# Patient Record
Sex: MALE | Race: WHITE | NOT HISPANIC OR LATINO | Employment: OTHER | URBAN - METROPOLITAN AREA
[De-identification: names, ages, dates, MRNs, and addresses within clinical notes are randomized per-mention and may not be internally consistent; named-entity substitution may affect disease eponyms.]

---

## 2023-04-06 ENCOUNTER — OFFICE VISIT (OUTPATIENT)
Dept: PRIMARY CARE | Facility: CLINIC | Age: 73
End: 2023-04-06
Payer: MEDICARE

## 2023-04-06 VITALS
DIASTOLIC BLOOD PRESSURE: 78 MMHG | HEART RATE: 101 BPM | WEIGHT: 211 LBS | BODY MASS INDEX: 28.58 KG/M2 | HEIGHT: 72 IN | SYSTOLIC BLOOD PRESSURE: 149 MMHG | TEMPERATURE: 97 F

## 2023-04-06 DIAGNOSIS — E78.2 MIXED HYPERLIPIDEMIA: Primary | ICD-10-CM

## 2023-04-06 DIAGNOSIS — R74.8 ABNORMAL LIVER ENZYMES: ICD-10-CM

## 2023-04-06 DIAGNOSIS — G20.A1 PARKINSON'S DISEASE (MULTI): ICD-10-CM

## 2023-04-06 DIAGNOSIS — F10.20 ALCOHOL USE DISORDER, MODERATE, DEPENDENCE (MULTI): ICD-10-CM

## 2023-04-06 DIAGNOSIS — D64.9 CHRONIC ANEMIA: ICD-10-CM

## 2023-04-06 PROBLEM — K21.9 GERD (GASTROESOPHAGEAL REFLUX DISEASE): Status: ACTIVE | Noted: 2023-04-06

## 2023-04-06 PROBLEM — F33.9 MAJOR DEPRESSIVE DISORDER, RECURRENT, UNSPECIFIED (CMS-HCC): Status: ACTIVE | Noted: 2020-10-23

## 2023-04-06 PROBLEM — I10 HYPERTENSION: Status: ACTIVE | Noted: 2020-10-23

## 2023-04-06 PROBLEM — F33.9 MAJOR DEPRESSIVE DISORDER, RECURRENT, UNSPECIFIED (CMS-HCC): Status: RESOLVED | Noted: 2020-10-23 | Resolved: 2023-04-06

## 2023-04-06 PROCEDURE — 1036F TOBACCO NON-USER: CPT | Performed by: INTERNAL MEDICINE

## 2023-04-06 PROCEDURE — 3078F DIAST BP <80 MM HG: CPT | Performed by: INTERNAL MEDICINE

## 2023-04-06 PROCEDURE — 3077F SYST BP >= 140 MM HG: CPT | Performed by: INTERNAL MEDICINE

## 2023-04-06 PROCEDURE — 1160F RVW MEDS BY RX/DR IN RCRD: CPT | Performed by: INTERNAL MEDICINE

## 2023-04-06 PROCEDURE — 99214 OFFICE O/P EST MOD 30 MIN: CPT | Performed by: INTERNAL MEDICINE

## 2023-04-06 PROCEDURE — 1159F MED LIST DOCD IN RCRD: CPT | Performed by: INTERNAL MEDICINE

## 2023-04-06 RX ORDER — LANOLIN ALCOHOL/MO/W.PET/CERES
1 CREAM (GRAM) TOPICAL DAILY
COMMUNITY

## 2023-04-06 RX ORDER — OXYBUTYNIN CHLORIDE 5 MG/1
TABLET ORAL
COMMUNITY
Start: 2017-12-20

## 2023-04-06 RX ORDER — SENNOSIDES 8.6 MG/1
1 TABLET ORAL DAILY
COMMUNITY
Start: 2021-04-01 | End: 2024-05-01 | Stop reason: WASHOUT

## 2023-04-06 RX ORDER — ACETAMINOPHEN 325 MG/1
TABLET ORAL
COMMUNITY
Start: 2022-07-11

## 2023-04-06 RX ORDER — POLYETHYLENE GLYCOL 3350 17 G/17G
17 POWDER, FOR SOLUTION ORAL
COMMUNITY
Start: 2022-07-11

## 2023-04-06 RX ORDER — ATORVASTATIN CALCIUM 20 MG/1
20 TABLET, FILM COATED ORAL DAILY
Qty: 90 TABLET | Refills: 3 | Status: SHIPPED | OUTPATIENT
Start: 2023-04-06 | End: 2023-11-08 | Stop reason: SDUPTHER

## 2023-04-06 RX ORDER — FLUDROCORTISONE ACETATE 0.1 MG/1
0.1 TABLET ORAL
COMMUNITY

## 2023-04-06 RX ORDER — CARBIDOPA, LEVODOPA AND ENTACAPONE 37.5; 200; 15 MG/1; MG/1; MG/1
TABLET, FILM COATED ORAL
COMMUNITY
Start: 2021-04-01 | End: 2023-12-05 | Stop reason: SDUPTHER

## 2023-04-06 RX ORDER — MIRABEGRON 25 MG/1
1 TABLET, FILM COATED, EXTENDED RELEASE ORAL DAILY
COMMUNITY
Start: 2023-03-15

## 2023-04-06 RX ORDER — OXCARBAZEPINE 300 MG/1
2 TABLET, FILM COATED ORAL 2 TIMES DAILY
COMMUNITY
Start: 2021-04-01

## 2023-04-06 RX ORDER — DONEPEZIL HYDROCHLORIDE 10 MG/1
TABLET, FILM COATED ORAL
COMMUNITY

## 2023-04-06 RX ORDER — ROSUVASTATIN CALCIUM 10 MG/1
TABLET, COATED ORAL
COMMUNITY
End: 2023-04-06 | Stop reason: ALTCHOICE

## 2023-04-06 RX ORDER — ATORVASTATIN CALCIUM 20 MG/1
20 TABLET, FILM COATED ORAL DAILY
COMMUNITY
End: 2023-04-06 | Stop reason: SDUPTHER

## 2023-04-06 RX ORDER — MULTIVITAMIN
TABLET ORAL 2 TIMES DAILY
COMMUNITY
Start: 2021-03-13

## 2023-04-06 RX ORDER — CHOLECALCIFEROL (VITAMIN D3) 50 MCG
1 TABLET ORAL DAILY
COMMUNITY

## 2023-04-06 RX ORDER — DULOXETIN HYDROCHLORIDE 60 MG/1
CAPSULE, DELAYED RELEASE ORAL
COMMUNITY
Start: 2021-04-01 | End: 2023-04-06 | Stop reason: ALTCHOICE

## 2023-04-06 ASSESSMENT — ENCOUNTER SYMPTOMS
COUGH: 0
DEPRESSION: 0
LOSS OF SENSATION IN FEET: 0
POLYDIPSIA: 0
SHORTNESS OF BREATH: 0
FEVER: 0
OCCASIONAL FEELINGS OF UNSTEADINESS: 1
PALPITATIONS: 0
CHILLS: 0

## 2023-04-06 NOTE — PROGRESS NOTES
Subjective   Patient ID: Thomas Frankel is a 72 y.o. male who presents for No chief complaint on file..    72-year-old male presents today to establish care previous PCP retired at the end of 2022 but he has not seen him since 2021.  He has a history of advanced Parkinson's disease with a deep brain stimulator managed by a neurologist at the [facility].  He also has a history of abnormal liver enzymes, chronic alcohol abuse, chronic anemia, and a prolonged hospitalization at the end of 2022.  He is presenting today to establish care and I performed a detailed assessment review of the patient's medical chart records medications available today.  At this time he has no specific concerns or complaints that he needs to have addressed today.  They are planning for a battery replacement of his deep brain stimulator approximately 6 weeks from now with his neurologist.  There have been no recent medication adjustments made but future medication adjustments are possible post this per the reports of his neurologist.  There are no available assessments of the patient's overall liver health that I can find in the records despite his history of alcohol use and previous abnormal blood testing regarding liver enzymes.  I advised them to allow me to update some imaging and see if there is any signs of fatty liver disease or cirrhosis on ultrasound.  In addition I counseled the patient in detail regarding his current alcohol use estimated to be about 8 drinks per week and his long history of alcohol use.  He has periods of prolonged abstinence mixed in with his heavy alcohol use but is currently using at this time.  I provided him counseling regarding alcohol use behaviors advised him about different programs and options to get away from alcohol if he felt it was necessary but ultimately advised him about the negative effects of alcohol, how is directly impacting his health already and how it may impact his health in the  future if he does not adhere to a plan of total cessation.  At the conclusion of our discussion the patient and his wife expressed a plan of working on cessation together moving forward.         Review of Systems   Constitutional:  Negative for chills and fever.   Respiratory:  Negative for cough and shortness of breath.    Cardiovascular:  Negative for chest pain and palpitations.   Endocrine: Negative for polydipsia and polyuria.       Objective   /78   Pulse 101   Temp 36.1 °C (97 °F) (Oral)   Ht 1.829 m (6')   Wt 95.7 kg (211 lb)   BMI 28.62 kg/m²     Physical Exam  Constitutional:       Appearance: Normal appearance.   HENT:      Head: Normocephalic and atraumatic.   Eyes:      Extraocular Movements: Extraocular movements intact.      Pupils: Pupils are equal, round, and reactive to light.   Neck:      Thyroid: No thyroid mass or thyromegaly.      Vascular: No carotid bruit.   Cardiovascular:      Rate and Rhythm: Normal rate and regular rhythm.      Heart sounds: No murmur heard.     No friction rub. No gallop.   Pulmonary:      Effort: No respiratory distress.      Breath sounds: No wheezing, rhonchi or rales.   Musculoskeletal:      Cervical back: Neck supple.      Right lower leg: No edema.      Left lower leg: No edema.   Lymphadenopathy:      Cervical: No cervical adenopathy.   Neurological:      Mental Status: He is alert.         Assessment/Plan   Problem List Items Addressed This Visit          Nervous    Parkinson's disease (CMS/HCC)       Other    Alcohol use disorder, moderate, dependence (CMS/HCC)    Relevant Medications    atorvastatin (Lipitor) 20 mg tablet    Other Relevant Orders    US abdomen limited liver    CBC    Comprehensive Metabolic Panel    Lipid Panel    Mixed hyperlipidemia - Primary    Relevant Medications    atorvastatin (Lipitor) 20 mg tablet    Other Relevant Orders    US abdomen limited liver    CBC    Comprehensive Metabolic Panel    Lipid Panel     Other Visit  Diagnoses       Abnormal liver enzymes        Relevant Medications    atorvastatin (Lipitor) 20 mg tablet    Other Relevant Orders    US abdomen limited liver    CBC    Comprehensive Metabolic Panel    Lipid Panel    Chronic anemia        Relevant Medications    atorvastatin (Lipitor) 20 mg tablet    Other Relevant Orders    US abdomen limited liver    CBC    Comprehensive Metabolic Panel    Lipid Panel             Patient was identified as a fall risk. Risk prevention instructions provided.

## 2023-04-06 NOTE — PATIENT INSTRUCTIONS
Please follow-up in 3 months        Ways to Help Prevent Falls at Home    Quick Tips   ? Ask for help if you need it. Most people want to help!   ? Get up slowly after sitting or laying down   ? Wear a medical alert device or keep cell phone in your pocket   ? Use night lights, especially areas near a bathroom   ? Keep the items you use often within reach on a small stool or end table   ? Use an assistive device such as walker or cane, as directed by provider/physical therapy   ? Use a non-slip mat and grab bars in your bathroom. Look for home health sections for best options     Other Areas to Focus On   ? Exercise and nutrition: Regular exercise or taking a falls prevention class are great ways improve strength and balance. Don’t forget to stay hydrated and bring a snack!   ? Medicine side effects: Some medicines can make you sleepy or dizzy, which could cause a fall. Ask your healthcare provider about the side effects your medicines could cause. Be sure to let them know if you take any vitamins or supplements as well.   ? Tripping hazards: Remove items you could trip on, such as loose mats, rugs, cords, and clutter. Wear closed toe shoes with rubber soles.   ? Health and wellness: Get regular checkups with your healthcare provider, plus routine vision and hearing screenings. Talk with your healthcare provider about:   o Your medicines and the possible side effects - bring them in a bag if that is easier!   o Problems with balance or feeling dizzy   o Ways to promote bone health, such as Vitamin D and calcium supplements   o Questions or concerns about falling     *Ask your healthcare team if you have questions     [facility], 2022

## 2023-05-09 ENCOUNTER — HOSPITAL ENCOUNTER (OUTPATIENT)
Dept: DATA CONVERSION | Facility: HOSPITAL | Age: 73
End: 2023-05-09
Attending: NEUROLOGICAL SURGERY | Admitting: NEUROLOGICAL SURGERY
Payer: MEDICARE

## 2023-05-09 DIAGNOSIS — G20.A1 PARKINSON'S DISEASE (MULTI): ICD-10-CM

## 2023-05-09 DIAGNOSIS — F02.80 DEMENTIA IN OTHER DISEASES CLASSIFIED ELSEWHERE, UNSPECIFIED SEVERITY, WITHOUT BEHAVIORAL DISTURBANCE, PSYCHOTIC DISTURBANCE, MOOD DISTURBANCE, AND ANXIETY (MULTI): ICD-10-CM

## 2023-05-09 DIAGNOSIS — F10.21 ALCOHOL DEPENDENCE, IN REMISSION (MULTI): ICD-10-CM

## 2023-05-09 DIAGNOSIS — Z45.42 ENCOUNTER FOR ADJUSTMENT AND MANAGEMENT OF NEUROSTIMULATOR: ICD-10-CM

## 2023-05-09 DIAGNOSIS — K21.9 GASTRO-ESOPHAGEAL REFLUX DISEASE WITHOUT ESOPHAGITIS: ICD-10-CM

## 2023-05-09 DIAGNOSIS — E78.5 HYPERLIPIDEMIA, UNSPECIFIED: ICD-10-CM

## 2023-05-09 DIAGNOSIS — I10 ESSENTIAL (PRIMARY) HYPERTENSION: ICD-10-CM

## 2023-05-09 DIAGNOSIS — Z98.1 ARTHRODESIS STATUS: ICD-10-CM

## 2023-06-05 ENCOUNTER — TELEPHONE (OUTPATIENT)
Dept: PRIMARY CARE | Facility: CLINIC | Age: 73
End: 2023-06-05
Payer: MEDICARE

## 2023-06-05 NOTE — TELEPHONE ENCOUNTER
----- Message from Sumit Toscano MD sent at 5/31/2023  3:56 PM EDT -----  There is evidence for chronic long-term inflammation and fatty lesion in the liver related to the patient's history of alcohol abuse but no other findings.  There is no evidence for cirrhosis on this testing but the patient needs to continue away fro  m alcohol or other liver toxic chemicals.

## 2023-06-06 ENCOUNTER — TELEPHONE (OUTPATIENT)
Dept: PRIMARY CARE | Facility: CLINIC | Age: 73
End: 2023-06-06
Payer: MEDICARE

## 2023-06-06 NOTE — TELEPHONE ENCOUNTER
Patient wants her  wants to test her  for D3 and he was never tested to Cholesterol with previous labs before his surgery. Can we use the labs referral you did in April and at D3?

## 2023-06-07 DIAGNOSIS — E55.9 VITAMIN D DEFICIENCY: Primary | ICD-10-CM

## 2023-06-07 NOTE — TELEPHONE ENCOUNTER
Since my testing has not been done, I ordered it. As long as it is [facility] lab it can be seen and done at same time

## 2023-07-06 ENCOUNTER — LAB (OUTPATIENT)
Dept: LAB | Facility: LAB | Age: 73
End: 2023-07-06
Payer: MEDICARE

## 2023-07-06 DIAGNOSIS — F10.20 ALCOHOL USE DISORDER, MODERATE, DEPENDENCE (MULTI): ICD-10-CM

## 2023-07-06 DIAGNOSIS — D64.9 CHRONIC ANEMIA: ICD-10-CM

## 2023-07-06 DIAGNOSIS — R74.8 ABNORMAL LIVER ENZYMES: ICD-10-CM

## 2023-07-06 DIAGNOSIS — E55.9 VITAMIN D DEFICIENCY: ICD-10-CM

## 2023-07-06 DIAGNOSIS — E78.2 MIXED HYPERLIPIDEMIA: ICD-10-CM

## 2023-07-06 LAB
ALANINE AMINOTRANSFERASE (SGPT) (U/L) IN SER/PLAS: 7 U/L (ref 10–52)
ALBUMIN (G/DL) IN SER/PLAS: 4.3 G/DL (ref 3.4–5)
ALKALINE PHOSPHATASE (U/L) IN SER/PLAS: 98 U/L (ref 33–136)
ANION GAP IN SER/PLAS: 13 MMOL/L (ref 10–20)
ASPARTATE AMINOTRANSFERASE (SGOT) (U/L) IN SER/PLAS: 14 U/L (ref 9–39)
BILIRUBIN TOTAL (MG/DL) IN SER/PLAS: 0.5 MG/DL (ref 0–1.2)
CALCIDIOL (25 OH VITAMIN D3) (NG/ML) IN SER/PLAS: 26 NG/ML
CALCIUM (MG/DL) IN SER/PLAS: 9.8 MG/DL (ref 8.6–10.6)
CARBON DIOXIDE, TOTAL (MMOL/L) IN SER/PLAS: 27 MMOL/L (ref 21–32)
CHLORIDE (MMOL/L) IN SER/PLAS: 105 MMOL/L (ref 98–107)
CHOLESTEROL (MG/DL) IN SER/PLAS: 202 MG/DL (ref 0–199)
CHOLESTEROL IN HDL (MG/DL) IN SER/PLAS: 58.2 MG/DL
CHOLESTEROL/HDL RATIO: 3.5
CREATININE (MG/DL) IN SER/PLAS: 0.86 MG/DL (ref 0.5–1.3)
ERYTHROCYTE DISTRIBUTION WIDTH (RATIO) BY AUTOMATED COUNT: 12.1 % (ref 11.5–14.5)
ERYTHROCYTE MEAN CORPUSCULAR HEMOGLOBIN CONCENTRATION (G/DL) BY AUTOMATED: 33 G/DL (ref 32–36)
ERYTHROCYTE MEAN CORPUSCULAR VOLUME (FL) BY AUTOMATED COUNT: 96 FL (ref 80–100)
ERYTHROCYTES (10*6/UL) IN BLOOD BY AUTOMATED COUNT: 4.85 X10E12/L (ref 4.5–5.9)
GFR MALE: >90 ML/MIN/1.73M2
GLUCOSE (MG/DL) IN SER/PLAS: 96 MG/DL (ref 74–99)
HEMATOCRIT (%) IN BLOOD BY AUTOMATED COUNT: 46.7 % (ref 41–52)
HEMOGLOBIN (G/DL) IN BLOOD: 15.4 G/DL (ref 13.5–17.5)
LDL: 111 MG/DL (ref 0–99)
LEUKOCYTES (10*3/UL) IN BLOOD BY AUTOMATED COUNT: 5.6 X10E9/L (ref 4.4–11.3)
NRBC (PER 100 WBCS) BY AUTOMATED COUNT: 0 /100 WBC (ref 0–0)
PLATELETS (10*3/UL) IN BLOOD AUTOMATED COUNT: 268 X10E9/L (ref 150–450)
POTASSIUM (MMOL/L) IN SER/PLAS: 4.7 MMOL/L (ref 3.5–5.3)
PROTEIN TOTAL: 7 G/DL (ref 6.4–8.2)
SODIUM (MMOL/L) IN SER/PLAS: 140 MMOL/L (ref 136–145)
TRIGLYCERIDE (MG/DL) IN SER/PLAS: 166 MG/DL (ref 0–149)
UREA NITROGEN (MG/DL) IN SER/PLAS: 12 MG/DL (ref 6–23)
VLDL: 33 MG/DL (ref 0–40)

## 2023-07-06 PROCEDURE — 80053 COMPREHEN METABOLIC PANEL: CPT

## 2023-07-06 PROCEDURE — 80061 LIPID PANEL: CPT

## 2023-07-06 PROCEDURE — 85027 COMPLETE CBC AUTOMATED: CPT

## 2023-07-06 PROCEDURE — 82306 VITAMIN D 25 HYDROXY: CPT

## 2023-07-06 PROCEDURE — 36415 COLL VENOUS BLD VENIPUNCTURE: CPT

## 2023-07-07 NOTE — RESULT ENCOUNTER NOTE
Lab work overall looks very good the only noted abnormality was mild vitamin D deficiency.  If not already taking vitamin D would encourage the over-the-counter version 2000 units once a day as preferred option to maintain strong vitamin D levels as well as healthy bones and other benefits of normal vitamin D

## 2023-09-07 VITALS — HEIGHT: 72 IN | BODY MASS INDEX: 26.87 KG/M2 | WEIGHT: 198.41 LBS

## 2023-09-14 NOTE — H&P
History & Physical Reviewed:   I have reviewed the History and Physical dated:  09-May-2023   History and Physical reviewed and relevant findings noted. Patient examined to review pertinent physical  findings.: No significant changes   Home Medications Reviewed: no changes noted   Allergies Reviewed: no changes noted       ERAS (Enhanced Recovery After Surgery):  ·  ERAS Patient: no     Consent:   COVID-19 Consent:  ·  COVID-19 Risk Consent Surgeon has reviewed key risks related to the risk of pedro COVID-19 and if they contract COVID-19 what the risks are.     Attestation:   Note Completion:  I am a:  Resident/Fellow   Attending Attestation I saw and evaluated the patient.  I personally obtained the key and critical portions of the history and physical exam or was physically present for key and  critical portions performed by the resident/fellow. I reviewed the resident/fellow?s documentation and discussed the patient with the resident/fellow.  I agree with the resident/fellow?s medical decision making as documented in the note.     I personally evaluated the patient on 09-May-2023         Electronic Signatures:  Martin James (Resident))  (Signed 09-May-2023 05:45)   Authored: History & Physical Reviewed, ERAS, Consent,  Note Completion  Herve Pelayo)  (Signed 09-May-2023 15:00)   Authored: Note Completion   Co-Signer: History & Physical Reviewed, ERAS, Consent, Note Completion      Last Updated: 09-May-2023 15:00 by Herve Pelayo)

## 2023-10-02 NOTE — OP NOTE
PROCEDURE DETAILS    Preoperative Diagnosis:  Parkinson disease  Postoperative Diagnosis:  Parkinson disease  Surgeon: Dr. Herve Pelayo  Resident/Fellow/Other Assistant: Martin James    Procedure:  Bilateral deep brain stimulator replacement  Anesthesia: LMA  Estimated Blood Loss: 2 cc  Findings: Excellent impedances   Specimens(s) Collected: no,     Complications: none apparent  Drains and/or Catheters: none        Operative Report:   Patient is a 72 year old male with history of Parkinson's disease who has bilateral DBS and generators. He is coming in for bilateral generator replacement.  Risk and benefits of the procedure was explained to the patient and his wife. Informed consent was obtained.    Patient was identified in the preop and brought to the operating room. A safety huddle was performed, patient identifiers, medications and allergies reviewed. Patient transferred to the operating table. All pressure points padded. He underwent LMA anesthesia.  Prior incisions were marked, prepped and draped in sterile fashion. Old incisions opened. Batteries removed and leads connected to the new generators. Impedances were excellent. Batteries secured in the pocket with silk sutures and incisions closed in  multilayer fashion using Vicryl and Monocryl sutures. Skin closed with skin glue. All counts were correct.    Dr. Pelayo was present and scrubbed for all critical portions of the case.                        Attestation:   Note Completion:  Attending Attestation I was present for key portions of the procedure and the procedure lasted longer than 5 minutes.    I am a: Resident/Fellow         Electronic Signatures:  Martin James (Resident))  (Signed 09-May-2023 09:17)   Authored: Post-Operative Note, Chart Review, Note Completion  Herve Pelayo)  (Signed 09-May-2023 14:44)   Authored: Note Completion   Co-Signer: Post-Operative Note, Chart Review, Note Completion      Last Updated: 09-May-2023  14:44 by Herve Pelayo)

## 2023-10-03 ENCOUNTER — APPOINTMENT (OUTPATIENT)
Dept: PRIMARY CARE | Facility: CLINIC | Age: 73
End: 2023-10-03
Payer: MEDICARE

## 2023-10-11 ENCOUNTER — TELEPHONE (OUTPATIENT)
Dept: NEUROLOGY | Facility: CLINIC | Age: 73
End: 2023-10-11
Payer: MEDICARE

## 2023-10-11 DIAGNOSIS — G20.A1 PARKINSON'S DISEASE, UNSPECIFIED WHETHER DYSKINESIA PRESENT, UNSPECIFIED WHETHER MANIFESTATIONS FLUCTUATE (MULTI): Primary | ICD-10-CM

## 2023-10-11 NOTE — TELEPHONE ENCOUNTER
Wife called asking for new referral for PT for Parkinson's ... I printed out one put in in July By Dr. Brandon Shen whom is resident. Should his one still be good or would they need a new one

## 2023-11-07 ENCOUNTER — TELEPHONE (OUTPATIENT)
Dept: NEUROLOGY | Facility: CLINIC | Age: 73
End: 2023-11-07
Payer: MEDICARE

## 2023-11-07 ENCOUNTER — APPOINTMENT (OUTPATIENT)
Dept: PHYSICAL THERAPY | Facility: CLINIC | Age: 73
End: 2023-11-07
Payer: MEDICARE

## 2023-11-07 DIAGNOSIS — G20.A1 PARKINSON'S DISEASE, UNSPECIFIED WHETHER DYSKINESIA PRESENT, UNSPECIFIED WHETHER MANIFESTATIONS FLUCTUATE (MULTI): Primary | ICD-10-CM

## 2023-11-07 NOTE — TELEPHONE ENCOUNTER
----- Message from Oumou Kenny, OT sent at 11/7/2023 11:30 AM EST -----  Regarding: referral request    Randy Humphrey    Mr. Frankel is scheduled next week for a PT and OT evaluation, however only has a referral for PT. For insurance purposes, could you please place a referral in for Occupational Therapy?    Thank you,  Oumou Kenny OTR/L

## 2023-11-08 ENCOUNTER — OFFICE VISIT (OUTPATIENT)
Dept: PRIMARY CARE | Facility: CLINIC | Age: 73
End: 2023-11-08
Payer: MEDICARE

## 2023-11-08 ENCOUNTER — PHARMACY VISIT (OUTPATIENT)
Dept: PHARMACY | Facility: CLINIC | Age: 73
End: 2023-11-08
Payer: COMMERCIAL

## 2023-11-08 VITALS
WEIGHT: 203 LBS | BODY MASS INDEX: 27.5 KG/M2 | TEMPERATURE: 97.9 F | SYSTOLIC BLOOD PRESSURE: 105 MMHG | HEIGHT: 72 IN | DIASTOLIC BLOOD PRESSURE: 66 MMHG | HEART RATE: 80 BPM

## 2023-11-08 DIAGNOSIS — I10 PRIMARY HYPERTENSION: ICD-10-CM

## 2023-11-08 DIAGNOSIS — R74.8 ABNORMAL LIVER ENZYMES: ICD-10-CM

## 2023-11-08 DIAGNOSIS — L08.9 RECURRENT INFECTION OF SKIN: Primary | ICD-10-CM

## 2023-11-08 DIAGNOSIS — D64.9 CHRONIC ANEMIA: ICD-10-CM

## 2023-11-08 DIAGNOSIS — E78.2 MIXED HYPERLIPIDEMIA: ICD-10-CM

## 2023-11-08 DIAGNOSIS — Z29.11 NEED FOR RSV IMMUNIZATION: ICD-10-CM

## 2023-11-08 DIAGNOSIS — F10.20 ALCOHOL USE DISORDER, MODERATE, DEPENDENCE (MULTI): ICD-10-CM

## 2023-11-08 PROCEDURE — 1159F MED LIST DOCD IN RCRD: CPT | Performed by: INTERNAL MEDICINE

## 2023-11-08 PROCEDURE — 1036F TOBACCO NON-USER: CPT | Performed by: INTERNAL MEDICINE

## 2023-11-08 PROCEDURE — RXMED WILLOW AMBULATORY MEDICATION CHARGE

## 2023-11-08 PROCEDURE — 3078F DIAST BP <80 MM HG: CPT | Performed by: INTERNAL MEDICINE

## 2023-11-08 PROCEDURE — 99213 OFFICE O/P EST LOW 20 MIN: CPT | Performed by: INTERNAL MEDICINE

## 2023-11-08 PROCEDURE — 1125F AMNT PAIN NOTED PAIN PRSNT: CPT | Performed by: INTERNAL MEDICINE

## 2023-11-08 PROCEDURE — 1160F RVW MEDS BY RX/DR IN RCRD: CPT | Performed by: INTERNAL MEDICINE

## 2023-11-08 PROCEDURE — 3074F SYST BP LT 130 MM HG: CPT | Performed by: INTERNAL MEDICINE

## 2023-11-08 RX ORDER — RESPIRATORY SYNCYTIAL VISUS VACCINE RECOMBINANT, ADJUVANTED 120MCG/0.5
0.5 KIT INTRAMUSCULAR ONCE
Qty: 0.5 ML | Refills: 0 | Status: SHIPPED | OUTPATIENT
Start: 2023-11-08 | End: 2023-11-09

## 2023-11-08 RX ORDER — CHLORHEXIDINE GLUCONATE 40 MG/ML
SOLUTION TOPICAL DAILY PRN
Qty: 236 ML | Refills: 0 | Status: SHIPPED | OUTPATIENT
Start: 2023-11-08 | End: 2024-05-01 | Stop reason: ALTCHOICE

## 2023-11-08 RX ORDER — ATORVASTATIN CALCIUM 20 MG/1
20 TABLET, FILM COATED ORAL DAILY
Qty: 90 TABLET | Refills: 3 | Status: SHIPPED | OUTPATIENT
Start: 2023-11-08 | End: 2024-05-01 | Stop reason: SDUPTHER

## 2023-11-08 ASSESSMENT — ENCOUNTER SYMPTOMS
FEVER: 0
POLYDIPSIA: 0
PALPITATIONS: 0
SHORTNESS OF BREATH: 0
CHILLS: 0
COUGH: 0

## 2023-11-08 NOTE — PROGRESS NOTES
Subjective   Patient ID: Thomas Frankel is a 73 y.o. male who presents for Follow-up.    Multiple beneficial changes since her last encounter.  First and foremost the patient has completely abstained from drinking since her last encounter we performed a significant intervention and counseling discussion regarding the complications of alcohol with his current medications and medical diagnoses.  This is wonderful and I was very encouraging of him to continue down this path.    His Parkinson's is overall stable but there was some recent issues with recurring chest wall infection and stimulator pocket infections that have been treated and are gone at this time but there were 2 within a 2-month.  And I advised him about recurring infections although there is no positive culture to consider a protocol with Hibiclens to cleanse the skin of any abnormal bacteria at this time.  They were very encouraged by this and were agreeable to doing it a prescription was sent to the pharmacy for them to obtain the proper product.  Detailed instructions given to patient and family today.    In addition to the above, we discussed his recent blood work and the stability of them.  Encouraged good medication compliance and follow-through.  And discussed his recent 2 episodes of falls and increased weakness in the setting of Parkinson's.  His Parkinson's neurologist is already taken the steps of scheduling and getting the patient into physical therapy, he will be visiting aside specifically that specializes in Parkinson's patients as well.  No further intervention required at this time         Review of Systems   Constitutional:  Negative for chills and fever.   Respiratory:  Negative for cough and shortness of breath.    Cardiovascular:  Negative for chest pain and palpitations.   Endocrine: Negative for polydipsia and polyuria.       Objective   /66 (BP Location: Right arm, Patient Position: Sitting, BP Cuff Size: Large adult)    Pulse 80   Temp 36.6 °C (97.9 °F) (Temporal)   Ht 1.829 m (6')   Wt 92.1 kg (203 lb)   BMI 27.53 kg/m²     Physical Exam  Constitutional:       Appearance: Normal appearance.   HENT:      Head: Normocephalic and atraumatic.   Eyes:      Extraocular Movements: Extraocular movements intact.      Pupils: Pupils are equal, round, and reactive to light.   Neck:      Thyroid: No thyroid mass or thyromegaly.      Vascular: No carotid bruit.   Cardiovascular:      Rate and Rhythm: Normal rate and regular rhythm.      Heart sounds: No murmur heard.     No friction rub. No gallop.   Pulmonary:      Effort: No respiratory distress.      Breath sounds: No wheezing, rhonchi or rales.   Musculoskeletal:      Cervical back: Neck supple.      Right lower leg: No edema.      Left lower leg: No edema.   Lymphadenopathy:      Cervical: No cervical adenopathy.   Neurological:      Mental Status: He is alert.         Assessment/Plan   Problem List Items Addressed This Visit             ICD-10-CM    Mixed hyperlipidemia E78.2    Relevant Medications    atorvastatin (Lipitor) 20 mg tablet    Hypertension I10     Other Visit Diagnoses         Codes    Recurrent infection of skin    -  Primary L08.9    Relevant Medications    chlorhexidine (Hibiclens) 4 % external liquid    Abnormal liver enzymes     R74.8    Relevant Medications    atorvastatin (Lipitor) 20 mg tablet    Alcohol use disorder, moderate, dependence (CMS/HCC)     F10.20    Relevant Medications    atorvastatin (Lipitor) 20 mg tablet    Chronic anemia     D64.9    Relevant Medications    atorvastatin (Lipitor) 20 mg tablet    Need for RSV immunization     Z29.11    Relevant Medications    respiratory syncytial virus, rsv, with adjuvant suspension (Arexvy, PF,) 120 mcg/0.5 mL suspension for reconstitution

## 2023-11-14 ENCOUNTER — EVALUATION (OUTPATIENT)
Dept: OCCUPATIONAL THERAPY | Facility: CLINIC | Age: 73
End: 2023-11-14
Payer: MEDICARE

## 2023-11-14 ENCOUNTER — EVALUATION (OUTPATIENT)
Dept: PHYSICAL THERAPY | Facility: CLINIC | Age: 73
End: 2023-11-14
Payer: MEDICARE

## 2023-11-14 DIAGNOSIS — G20.A1 PARKINSON'S DISEASE (MULTI): ICD-10-CM

## 2023-11-14 DIAGNOSIS — R25.1 TREMOR: Primary | ICD-10-CM

## 2023-11-14 DIAGNOSIS — R26.9 PROGRESSIVE GAIT DISORDER: ICD-10-CM

## 2023-11-14 DIAGNOSIS — R29.6 RECURRENT FALLS: Primary | ICD-10-CM

## 2023-11-14 DIAGNOSIS — G20.A1 PARKINSON'S DISEASE, UNSPECIFIED WHETHER DYSKINESIA PRESENT, UNSPECIFIED WHETHER MANIFESTATIONS FLUCTUATE (MULTI): ICD-10-CM

## 2023-11-14 PROCEDURE — 97162 PT EVAL MOD COMPLEX 30 MIN: CPT | Mod: GP | Performed by: PHYSICAL THERAPIST

## 2023-11-14 PROCEDURE — 97112 NEUROMUSCULAR REEDUCATION: CPT | Mod: GP | Performed by: PHYSICAL THERAPIST

## 2023-11-14 PROCEDURE — 97530 THERAPEUTIC ACTIVITIES: CPT | Mod: GO

## 2023-11-14 PROCEDURE — 97165 OT EVAL LOW COMPLEX 30 MIN: CPT | Mod: GO

## 2023-11-14 ASSESSMENT — ENCOUNTER SYMPTOMS
OCCASIONAL FEELINGS OF UNSTEADINESS: 1
DEPRESSION: 1
LOSS OF SENSATION IN FEET: 0

## 2023-11-14 ASSESSMENT — PAIN SCALES - GENERAL: PAINLEVEL_OUTOF10: 0 - NO PAIN

## 2023-11-14 ASSESSMENT — PAIN - FUNCTIONAL ASSESSMENT: PAIN_FUNCTIONAL_ASSESSMENT: 0-10

## 2023-11-14 NOTE — PROGRESS NOTES
"Occupational Therapy    Occupational Therapy Evaluation    Name: Thomas Frankel  MRN: [id number]  : 1950  Date: 23  Time Calculation  Start Time: 1015  Stop Time: 1100  Time Calculation (min): 45 min  Visit: 1  Medicare  Assessment:   Patient is a 73 year old male with dx. Of PD. Patient presents WFL of UE strength, ROM and coordination at this time. Patient and spouse report patient does not have difficulties with ADLs. I did still educate patient and spouse on adaptive equipment/modifications as needed when patient's L hand tremors got worse. Patient and spouse were in agreement no further skilled OT was needed at this time. Patient will continue with PT for balance and mobility.  Plan:   No further visits indicated at this time.    Subjective   Patient agreeable to OT evaluation. Accompanied by spouse. Completes ADLs at mod I level. Has grab bars in showers.   Current Problem:  1. Tremor        2. Parkinson's disease          General:   Mr. Frankel is a 71 YO man with PD(dx , s/p bl STN [employer] DBS in ), cognitive decline.Per wife, patient was diagnosed with PD at age 41. Dr. Lezama was his neurologist at the time. He had DBS done in  by Dr. Ferreira who was at [facility] back then. He has been on Stavelo for many years with good control of tremors.  General  Reason for Referral: OT eval and treat  Referred By: Brandon Shen MD  Precautions:  Precautions  STEADI Fall Risk Score (The score of 4 or more indicates an increased risk of falling): 10  Precautions Comment: high fall risk  Pain Assessment:  Pain Assessment  Pain Assessment: 0-10  Pain Score: 0 - No pain  Work History:  Retired  Roles/Routines:  Does chores, watches sports   Patient Goal:  \"To get stronger\"  Personal Factors that my impact Care:   Cognition  Objective   Observation:  R handed  Uses straight cane   Glasses for reading  ROM:  BUE ROM WFL  Strength:  BUE WFL  R : 80#  L : 75#  Coordination:  Slight tremor in B " hands with digit opposition. Able to complete finger to nose with eyes open and closed   Sensation:  Denies parathesias   Tremor   Reports when medication wears off, the L hand tremor returns in the evenings occasionally  Outcome Measures:  OT Adult Other Outcome Measures  9 Hole Peg Test:  (R: 30 seconds   L: 31 seconds)    OP EDUCATION/TREATMENT:   Patient and spouse were educated on and were able to try weighted items I.e weighted eating utensils, weighted pen anne, weighted hand glove. Also educated on keeping proximal joints on table top when eating to assist with decreasing distal tremor.

## 2023-11-14 NOTE — LETTER
November 14, 2023     Patient: Thomas Frankel   YOB: 1950   Date of Visit: 11/14/2023       To Whom it May Concern:    Thomas Frankel was seen in my clinic on 11/14/2023. He {Return to school/sport:10193}.    If you have any questions or concerns, please don't hesitate to call.         Sincerely,          Sandeep Sanders, PT        CC: No Recipients

## 2023-11-14 NOTE — Clinical Note
November 14, 2023     Patient: Thomas Frankel   YOB: 1950   Date of Visit: 11/14/2023       To Whom it May Concern:    Thomas Frankel was seen in my clinic on 11/14/2023. He {Return to school/sport:55752}.    If you have any questions or concerns, please don't hesitate to call.         Sincerely,          Oumou Kenny, OT        CC: No Recipients

## 2023-11-14 NOTE — PROGRESS NOTES
Patient is accompanied by his spouse who answers most questions. 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 He will benefit from skilled PT to reduce fall risk and maximize his functional mobility.     Plan:  Treatment/Interventions: Education/ Instruction, Gait training, Manual therapy, Neuromuscular re-education, Therapeutic exercises, Therapeutic activities  PT Plan: Skilled PT  PT Frequency: 1 time per week (1-2 x per week)  Duration: 10 visits  Onset Date: 10/11/23  Certification Period Start Date: 11/14/23  Certification Period End Date: 02/12/24  Rehab Potential: Good  Plan of Care Agreement: Patient, Other (comment) (spouse)    Current Problem:   1. Recurrent falls        2. Parkinson's disease, unspecified whether dyskinesia present, unspecified whether manifestations fluctuate  Referral to Physical Therapy    Follow Up In Physical Therapy      3. Progressive gait disorder  Follow Up In Physical Therapy          Subjective    General:  General  Reason for Referral: PT for PD, gait disorder, falls  Referred By: Kam Chamorro CNP  Past Medical History Relevant to Rehab: Lumbar surgeries, left ankle Fx and ORIF, PD with DBS,  Preferred Learning Style: visual, verbal    Diagnosed with PD 30+ years ago in his 40s. Had DBS placed 15 years ago. Chief PD complaint of are incontinence, gait instability, left hand tremor. Has fallen at least 5 times in the last year. He can get himself back up.   Takes carbidopa/levadopa 5x a day, including once in the middle of the night. Does have  off periods before next dose.   Lives with wife in 2 story home. Two railings on the stairs. Bed and bath upstairs. Fell a few times in the last month. Broke 8 ribs last year from a fall and was hospitalized with some complications for 5 months. Had delirium and punctured lung. Received 2 acute rehab stays, one at [facility] and one at . After acute rehab had a SNF stay for 2-3 months. Made excellent functional improvements with all the therapy. He is roughly at his preinjury baseline now.   PMH: 2 lumbar surgeries- fusion, chronic low back pain, left ankle fractured,   Has had multiple episodes of outpatient rehab in the past for PD including PT, OT, SLP.      Precautions:  Precautions  STEADI Fall Risk Score (The score of 4 or more indicates an increased risk of falling): 10  Precautions Comment: high fall risk  Vital Signs:  Reports stable BP in the 120s. No reports of hypotension symptoms.   Pain:  Chronic low back pain  Prior Level of Function:  Modified independent to independent with ADLs. Does not leave the house except for MD appointments due to incontinence issues and mobility impairments    Objective   Cognition:  Appropriate during todays session. No significant cognitive deficits were observed without specific tests today.   Speech is hypophonic. Pt is less talkative- wife typically responds.   General Assessments:  Posture Comment: Postural deformity due to chronic lumbar surgeries and postural deviations, lateral scoliotic curbe    [facility] Comments: Limited left ankle inversion > eversion d/t remote Fx    Strength Comments: Bilateral LE strength is WNL except left ankle (left ankle DF 4/5, PF 4/5, inversion 3/5, eversion 4-/5)      Functional Assessments:  Gait Comment: Lack of left ankle push off, left foot whips into ER at late stance, some LE dystonia, gait instability, walks on heels, decreased arm swing  , Balance Comment: half tandem = 24 secs; half tandem with EC = 6 secs; EC on foam = 14  secs; SLS left = 2 secs, right = 5 secs; EC on incline = 5 secs    , Stairs Comment: Ambulates up/down reciprocally with railing  , Bed Mobility Comment: Independent  , Transfers Comment: Needs UE assist for standing transfers, some imbalance and retropulsion with transfers    Outcome Measures:  Timed Up and Go Test  How many seconds did it take to complete the 5 tasks?: 15.19 seconds  TUG Cognitive: 20.3  MiniBest score = 14/28 (fall risk)  Other Measures  10M Walk Test: 11.22 secs     OP EDUCATION:  Discussed Eval findings, treatment plan, impacts of PD versus other musculoskeletal conditions, goals, and expectations.   Educated on the importance of movement and exercise in PD, fall risk and safety, left ankle weakness specifically, and [facility].     Goals:  Active       PT Problem       Falls       Start:  11/14/23    Expected End:  01/28/24       Pt. Will report no falls in > 7 weeks. Pt will be able to restate fall precaution measures and will have implemented these measures appropriately to minimize fall risk.          Mini Best        Start:  11/14/23    Expected End:  01/28/24       Improve MiniBest score to > 18/28 to indicate improved multifactorial balance and reduced fall risk.          TUG        Start:  11/14/23    Expected End:  01/28/24       TUG to < 13 seconds, TUG cog/motor < 15.5 secs  Pt. Will demonstrate minimal negative impact on gait quality and safety during dual task mobility.         Balance       Start:  11/14/23    Expected End:  01/28/24       Half tandem w/ EC > 15 secs; eyes closed narrow base on foam > 15 secs; EC on incline > 15 secs; half tandem with head turns > 20 seconds         Home program       Start:  11/14/23    Expected End:  01/28/24       Pt. Will be independent and compliant with home program. He will begin exercise at [facility] prior to discharge from PT.

## 2023-11-14 NOTE — LETTER
November 14, 2023     Patient: Thomas Frankel   YOB: 1950   Date of Visit: 11/14/2023       To Whom It May Concern:    It is my medical opinion that Thomas Frankel {Work release (duty restriction):18086}.    If you have any questions or concerns, please don't hesitate to call.         Sincerely,        Sandeep Sanders, PT    CC: No Recipients

## 2023-11-14 NOTE — Clinical Note
November 14, 2023     Patient: Thomas Frankel   YOB: 1950   Date of Visit: 11/14/2023       To Whom It May Concern:    It is my medical opinion that Thomas Frankel {Work release (duty restriction):74733}.    If you have any questions or concerns, please don't hesitate to call.         Sincerely,        Oumou Kenny, OT    CC: No Recipients

## 2023-11-20 ENCOUNTER — TREATMENT (OUTPATIENT)
Dept: PHYSICAL THERAPY | Facility: CLINIC | Age: 73
End: 2023-11-20
Payer: MEDICARE

## 2023-11-20 DIAGNOSIS — R26.9 PROGRESSIVE GAIT DISORDER: ICD-10-CM

## 2023-11-20 DIAGNOSIS — R29.6 RECURRENT FALLS: Primary | ICD-10-CM

## 2023-11-20 DIAGNOSIS — G20.A1 PARKINSON'S DISEASE, UNSPECIFIED WHETHER DYSKINESIA PRESENT, UNSPECIFIED WHETHER MANIFESTATIONS FLUCTUATE (MULTI): ICD-10-CM

## 2023-11-20 PROCEDURE — 97112 NEUROMUSCULAR REEDUCATION: CPT | Mod: GP | Performed by: PHYSICAL THERAPIST

## 2023-11-20 PROCEDURE — 97110 THERAPEUTIC EXERCISES: CPT | Mod: GP | Performed by: PHYSICAL THERAPIST

## 2023-11-20 ASSESSMENT — PAIN SCALES - GENERAL: PAINLEVEL_OUTOF10: 0 - NO PAIN

## 2023-11-20 ASSESSMENT — PAIN - FUNCTIONAL ASSESSMENT: PAIN_FUNCTIONAL_ASSESSMENT: 0-10

## 2023-11-20 NOTE — PROGRESS NOTES
"Physical Therapy    Physical Therapy Treatment    Patient Name: Thomas Frankel  MRN: [id number]  Today's Date: 11/20/2023  Time Calculation  Start Time: 1415  Stop Time: 1500  Time Calculation (min): 45 min    Visit #2  Assessment:  We focused today's session on training with new HEP. Provided ample HEP education to patient and spouse. Spouse was present during the entire session and included in all training. Issued HEP via demo, practice, and handouts. Marked left ankle weakness is noted during ankle strengthening exercises. Wife was really pleased at how well he stood up from a chair after training to bend at the waist and get \"nose over toes\".  With arms across his chest, he needed multiple attempts or assistance to rise from chair 75% of the time.     Plan:  Continue skilled PT per plan of care.     Current Problem  1. Parkinson's disease, unspecified whether dyskinesia present, unspecified whether manifestations fluctuate  Follow Up In Physical Therapy      2. Progressive gait disorder  Follow Up In Physical Therapy          General     General  Reason for Referral: PT for PD, gait disorder, falls  Referred By: Kam Chamorro  Past Medical History Relevant to Rehab: Lumbar surgeries  Preferred Learning Style: visual, verbal    Subjective    Precautions  Precautions  STEADI Fall Risk Score (The score of 4 or more indicates an increased risk of falling): 10  Precautions Comment: high fall risk  Vital Signs     Pain  No pain reported today    Objective     Treatments:  - standing heel raises with UE support for left ankle PF strength. 3 x 10  - standing postural training with back to wall. 1 minute holds x 3 sets. Cues for chin tuck, full knee extension and shoulders back  - alternating 6 inch step taps x 30 without UE support  - seated hamstring stretching. 30s x 3 ea  - seated left ankle inversion w/ TB. Red. 2 x 10  - seated left ankle eversion w/TB. Red 2 x 10  - supine elbow press postural stretching. 5s " holds x 10    Neuromuscular Re-Education:  - seated twist and reach PD exercise 5 s hold x 10 ea  - heel - toe rocking with back to wall. X 30. Without UE support  - sit to stands from arm chair. X 10 with UE assist. Progressed to 10 reps with arms across chest.   OP EDUCATION:  Provided ample HEP education to patient and spouse. Spouse was present during the entire session and included in all training. Issued HEP via demo, practice, and handouts.   Goals

## 2023-11-30 ENCOUNTER — TREATMENT (OUTPATIENT)
Dept: PHYSICAL THERAPY | Facility: CLINIC | Age: 73
End: 2023-11-30
Payer: MEDICARE

## 2023-11-30 DIAGNOSIS — R26.9 PROGRESSIVE GAIT DISORDER: Primary | ICD-10-CM

## 2023-11-30 DIAGNOSIS — R29.6 RECURRENT FALLS: ICD-10-CM

## 2023-11-30 DIAGNOSIS — G20.A1 PARKINSON'S DISEASE, UNSPECIFIED WHETHER DYSKINESIA PRESENT, UNSPECIFIED WHETHER MANIFESTATIONS FLUCTUATE (MULTI): ICD-10-CM

## 2023-11-30 PROCEDURE — 97112 NEUROMUSCULAR REEDUCATION: CPT | Mod: GP | Performed by: PHYSICAL THERAPIST

## 2023-11-30 PROCEDURE — 97110 THERAPEUTIC EXERCISES: CPT | Mod: GP | Performed by: PHYSICAL THERAPIST

## 2023-11-30 NOTE — PROGRESS NOTES
Physical Therapy    Physical Therapy Treatment    Patient Name: Thomas Frankel  MRN: [id number]  Today's Date: 11/30/2023  Time Calculation  Start Time: 1015  Stop Time: 1100  Time Calculation (min): 45 min    Visit #3  Assessment:  Limited left ankle ROM causes gait deviation in late left stance to toe off. There is diminished anterior tibial translation and lack of push off. He tends to walk on his heels primarily which reduces his stability. Notable instability during standing activities on foam, so light UE support was needed. Hip dissociation is improving during sit <> stand which gets more weight anterior and helps him control sit<> stand better.     Plan:  Continue skilled PT per plan of care.     Current Problem  1. Progressive gait disorder  Follow Up In Physical Therapy      2. Parkinson's disease, unspecified whether dyskinesia present, unspecified whether manifestations fluctuate  Follow Up In Physical Therapy      3. Recurrent falls            General  Subjective    Pt. Reports that his HEP is difficult. When he elaborates it is apparent that his HEP provides the appropriate amount of challenge but it is not too difficult.   Precautions  High fall risk    Pain  Some lower back pain. He believes that he pulled a muscle in his back. His movement today was not hindered by this.     Objective     Treatments:  Therapeutic Exercise:  - supine elbow press postural stretching. 5s holds x 10  - bridging with Legs on t-ball. 5s x 15  - seated hamstring stretching. 30s x 3 ea    Not Performed today:  - seated left ankle inversion w/ TB. Red. 2 x 10  - seated left ankle eversion w/TB. Red 2 x 10      Neuromuscular Re-Education:  - 8 inch step taps on foam. 2 x 15 ea  - seated twist and reach PD exercise 5 s hold x 10 ea  - heel - toe rocking with back to wall. X 30. Without UE support  - sit to stands from mat. X 10 with UE assist. Progressed to 10 reps with feet on foam pad.   - stepping forward and backward over  suzanne with very light unilateral UE support  - standing rotate and clap with back to wall  - Walking forward/backward with cable column resistance pulling posterior at hips    OP EDUCATION:    Goals  Active       PT Problem       Falls       Start:  11/14/23    Expected End:  01/28/24       Pt. Will report no falls in > 7 weeks. Pt will be able to restate fall precaution measures and will have implemented these measures appropriately to minimize fall risk.          Mini Best        Start:  11/14/23    Expected End:  01/28/24       Improve MiniBest score to > 18/28 to indicate improved multifactorial balance and reduced fall risk.          TUG        Start:  11/14/23    Expected End:  01/28/24       TUG to < 13 seconds, TUG cog/motor < 15.5 secs  Pt. Will demonstrate minimal negative impact on gait quality and safety during dual task mobility.         Balance       Start:  11/14/23    Expected End:  01/28/24       Half tandem w/ EC > 15 secs; eyes closed narrow base on foam > 15 secs; EC on incline > 15 secs; half tandem with head turns > 20 seconds         Home program       Start:  11/14/23    Expected End:  01/28/24       Pt. Will be independent and compliant with home program. He will begin exercise at [facility] prior to discharge from PT.

## 2023-12-05 ENCOUNTER — TELEPHONE (OUTPATIENT)
Dept: NEUROLOGY | Facility: CLINIC | Age: 73
End: 2023-12-05

## 2023-12-05 DIAGNOSIS — G20.A2 PARKINSON'S DISEASE WITHOUT DYSKINESIA, WITH FLUCTUATING MANIFESTATIONS (MULTI): Primary | ICD-10-CM

## 2023-12-05 RX ORDER — CARBIDOPA, LEVODOPA AND ENTACAPONE 37.5; 200; 15 MG/1; MG/1; MG/1
TABLET, FILM COATED ORAL
Qty: 150 TABLET | Refills: 11 | Status: SHIPPED | OUTPATIENT
Start: 2023-12-05

## 2023-12-07 ENCOUNTER — APPOINTMENT (OUTPATIENT)
Dept: PHYSICAL THERAPY | Facility: CLINIC | Age: 73
End: 2023-12-07
Payer: MEDICARE

## 2023-12-14 ENCOUNTER — TREATMENT (OUTPATIENT)
Dept: PHYSICAL THERAPY | Facility: CLINIC | Age: 73
End: 2023-12-14
Payer: MEDICARE

## 2023-12-14 DIAGNOSIS — R29.6 RECURRENT FALLS: Primary | ICD-10-CM

## 2023-12-14 DIAGNOSIS — G20.A1 PARKINSON'S DISEASE, UNSPECIFIED WHETHER DYSKINESIA PRESENT, UNSPECIFIED WHETHER MANIFESTATIONS FLUCTUATE (MULTI): ICD-10-CM

## 2023-12-14 DIAGNOSIS — R26.9 PROGRESSIVE GAIT DISORDER: ICD-10-CM

## 2023-12-14 PROCEDURE — 97112 NEUROMUSCULAR REEDUCATION: CPT | Mod: GP | Performed by: PHYSICAL THERAPIST

## 2023-12-14 NOTE — PROGRESS NOTES
Physical Therapy    Physical Therapy Treatment    Patient Name: Thomas Frankel  MRN: [id number]  Today's Date: 12/14/2023  Time Calculation  Start Time: 1020  Stop Time: 1100  Time Calculation (min): 40 min    Visit #4  Assessment:  Notable posterior instability. He tends to stand and walk on his heels primarily which reduces his stability. Increased safety and control during sit to stands. He was able to complete a full session without being impeded by his back pain today. Notable LUE tremor during intentional movement today. He chooses to use cane at all times in clinic due to decreased balance confidence.     Plan:  Continue skilled PT per plan of care.     Current Problem  1. Recurrent falls        2. Parkinson's disease, unspecified whether dyskinesia present, unspecified whether manifestations fluctuate  Follow Up In Physical Therapy      3. Progressive gait disorder  Follow Up In Physical Therapy          General  Subjective    Pt c/o increased lower back pain lately. He may request an X-Ray from his physician. Apparently he had a follow up with his surgeon 6 months ago and there were no concerns.     Precautions  High fall risk  Hx of 2 extensive lumbar surgeries - decompression / fusion  Pain  Some lower back pain. He believes that he pulled a muscle in his back. His movement today was not hindered by this.     Objective     Treatments:  Not Performed today:  - seated left ankle inversion w/ TB. Red. 2 x 10  - seated left ankle eversion w/TB. Red 2 x 10  - supine elbow press postural stretching. 5s holds x 10  - bridging with Legs on t-ball. 5s x 15  - seated hamstring stretching. 30s x 3 ea    Neuromuscular Re-Education:  - 6 inch step taps. 2 x 15 ea  - seated BIG reach down and up. (Increased back pain)  - heel raises 2 x 15  - sit to stands from mat. X 10 without UE assist.   - stepping forward and backward over suzanne on foam with very light unilateral UE support  - lateral step and reach x 10 ea  -  normal stance on foam with head rotations 2 x 15  - normal stance on foam with EC. 20s x 3    OP EDUCATION:    Goals  Active       PT Problem       Falls       Start:  11/14/23    Expected End:  01/28/24       Pt. Will report no falls in > 7 weeks. Pt will be able to restate fall precaution measures and will have implemented these measures appropriately to minimize fall risk.          Mini Best        Start:  11/14/23    Expected End:  01/28/24       Improve MiniBest score to > 18/28 to indicate improved multifactorial balance and reduced fall risk.          TUG        Start:  11/14/23    Expected End:  01/28/24       TUG to < 13 seconds, TUG cog/motor < 15.5 secs  Pt. Will demonstrate minimal negative impact on gait quality and safety during dual task mobility.         Balance       Start:  11/14/23    Expected End:  01/28/24       Half tandem w/ EC > 15 secs; eyes closed narrow base on foam > 15 secs; EC on incline > 15 secs; half tandem with head turns > 20 seconds         Home program       Start:  11/14/23    Expected End:  01/28/24       Pt. Will be independent and compliant with home program. He will begin exercise at [facility] prior to discharge from PT.

## 2023-12-19 ENCOUNTER — APPOINTMENT (OUTPATIENT)
Dept: PHYSICAL THERAPY | Facility: CLINIC | Age: 73
End: 2023-12-19
Payer: MEDICARE

## 2023-12-21 ENCOUNTER — APPOINTMENT (OUTPATIENT)
Dept: PHYSICAL THERAPY | Facility: CLINIC | Age: 73
End: 2023-12-21
Payer: MEDICARE

## 2023-12-22 ENCOUNTER — APPOINTMENT (OUTPATIENT)
Dept: PHYSICAL THERAPY | Facility: CLINIC | Age: 73
End: 2023-12-22
Payer: MEDICARE

## 2023-12-28 ENCOUNTER — APPOINTMENT (OUTPATIENT)
Dept: PHYSICAL THERAPY | Facility: CLINIC | Age: 73
End: 2023-12-28
Payer: MEDICARE

## 2024-01-22 ENCOUNTER — APPOINTMENT (OUTPATIENT)
Dept: PHYSICAL THERAPY | Facility: CLINIC | Age: 74
End: 2024-01-22
Payer: MEDICARE

## 2024-02-01 ENCOUNTER — TREATMENT (OUTPATIENT)
Dept: PHYSICAL THERAPY | Facility: CLINIC | Age: 74
End: 2024-02-01
Payer: MEDICARE

## 2024-02-01 DIAGNOSIS — R26.9 PROGRESSIVE GAIT DISORDER: ICD-10-CM

## 2024-02-01 DIAGNOSIS — R29.6 RECURRENT FALLS: Primary | ICD-10-CM

## 2024-02-01 DIAGNOSIS — G20.A1 PARKINSON'S DISEASE, UNSPECIFIED WHETHER DYSKINESIA PRESENT, UNSPECIFIED WHETHER MANIFESTATIONS FLUCTUATE (MULTI): ICD-10-CM

## 2024-02-01 PROCEDURE — 97112 NEUROMUSCULAR REEDUCATION: CPT | Mod: GP | Performed by: PHYSICAL THERAPIST

## 2024-02-01 NOTE — PROGRESS NOTES
Physical Therapy    Physical Therapy Treatment    Patient Name: Thomas Frankel  MRN: [id number]  Today's Date: 2/1/2024  Time Calculation  Start Time: 1300  Stop Time: 1355  Time Calculation (min): 55 min  Medicare  Certification period: 11/14/23 to 2/12/24  Visit #5  Assessment:  We had a long discussion about increasing his activity level and socialization by getting out of the house. He doesn't leave the house except for appointments due to worrying about how others view him and fear of falling. We discussed using the rollator out of the house for safety. The rollator will significantly reduce his risk of falling. I encouraged him to return to [facility] exercise [facility]; so we had a discussion about [facility] as well.   His wife is excited about the idea of returning to [facility] and the patient seems to be agreeable to it. It has been 5 years since he was last there, and he doesn't want people to see that he has regressed.   Notable posterior instability. He tends to stand and walk on his heels primarily which reduces his stability. Poor posterior balance reactions noted. He will work on this via new exercise for home. Increased safety and control during sit to stands.     Plan:  Continue skilled PT per plan of care to minimize fall risk.     Current Problem  1. Recurrent falls        2. Parkinson's disease, unspecified whether dyskinesia present, unspecified whether manifestations fluctuate  Follow Up In Physical Therapy      3. Progressive gait disorder  Follow Up In Physical Therapy          General  Subjective    This is his 1st visit in 1.5 months. Patient reports that he is doing better than he was the last time I saw him, which is wonderful. He has been doing some of his PT exercises everyday. He has fallen once recently. Not sure what caused him to fall or what direction he fell. There were no injuries and he got himself up from the floor. His wife believes that he loses his balance mostly when  reaching/turning and backwards.    Precautions  High fall risk  Hx of 2 extensive lumbar surgeries - decompression / fusion  Pain  Chronic lower back pain. He states that the exercises help his back pain.     Objective     Treatments:  Not Performed today:  - seated left ankle inversion w/ TB. Red. 2 x 10  - seated left ankle eversion w/TB. Red 2 x 10  - supine elbow press postural stretching. 5s holds x 10  - bridging with Legs on t-ball. 5s x 15  - seated hamstring stretching. 30s x 3 ea    Neuromuscular Re-Education:  - 6 inch step taps. 2 x 15 ea  - sit to stands from mat. X 10 without UE assist.   - 360 degree turns in corner with supervision.   - heel toe rocking with back to wall.   - lateral step and reach x 10 ea  - posterior lean in place training working on bigger/stronger posterior stepping strategy  Gait assessment: Ambulated 1 lap around clinic 215 feet with cane.   - normal stance on foam with head rotations 2 x 15  - normal stance on foam with EC. 20s x 3    OP EDUCATION:  We had a long discussion about increasing his activity level and socialization by getting out of the house. He doesn't leave the house except for appointments due to worrying about how others view him and fear of falling. We discussed using the rollator out of the house for safety. The rollator will significantly reduce his risk of falling. I encouraged him to return to [facility]; so we had a discussion about [facility] as well.   His wife is excited about the idea of returning to [facility] and the patient seems to be agreeable to it. It has been 5 years since he was last there, and he doesn't want people to see that he has regressed.     Goals  Active       PT Problem       Falls       Start:  11/14/23    Expected End:  03/01/24       Pt. Will report no falls in > 7 weeks. Pt will be able to restate fall precaution measures and will have implemented these measures appropriately to minimize fall risk.          Mini  Best        Start:  11/14/23    Expected End:  03/01/24       Improve MiniBest score to > 18/28 to indicate improved multifactorial balance and reduced fall risk.          TUG        Start:  11/14/23    Expected End:  03/01/24       TUG to < 13 seconds, TUG cog/motor < 15.5 secs  Pt. Will demonstrate minimal negative impact on gait quality and safety during dual task mobility.         Balance       Start:  11/14/23    Expected End:  03/01/24       Half tandem w/ EC > 15 secs; eyes closed narrow base on foam > 15 secs; EC on incline > 15 secs; half tandem with head turns > 20 seconds         Home program       Start:  11/14/23    Expected End:  03/01/24       Pt. Will be independent and compliant with home program. He will begin exercise at [facility] prior to discharge from PT.

## 2024-02-05 ENCOUNTER — TREATMENT (OUTPATIENT)
Dept: PHYSICAL THERAPY | Facility: CLINIC | Age: 74
End: 2024-02-05
Payer: MEDICARE

## 2024-02-05 DIAGNOSIS — G20.A1 PARKINSON'S DISEASE, UNSPECIFIED WHETHER DYSKINESIA PRESENT, UNSPECIFIED WHETHER MANIFESTATIONS FLUCTUATE (MULTI): ICD-10-CM

## 2024-02-05 DIAGNOSIS — R26.9 PROGRESSIVE GAIT DISORDER: ICD-10-CM

## 2024-02-05 DIAGNOSIS — R29.6 RECURRENT FALLS: Primary | ICD-10-CM

## 2024-02-05 PROCEDURE — 97116 GAIT TRAINING THERAPY: CPT | Mod: GP | Performed by: PHYSICAL THERAPIST

## 2024-02-05 PROCEDURE — 97112 NEUROMUSCULAR REEDUCATION: CPT | Mod: GP | Performed by: PHYSICAL THERAPIST

## 2024-02-05 NOTE — PROGRESS NOTES
Physical Therapy    Physical Therapy Treatment    Patient Name: Thomas Frankel  MRN: [id number]  Today's Date: 2/5/2024     Medicare  Certification period: 11/14/23 to 2/12/24  Visit #6  Assessment:  Patient reported general fatigue and UE fatigue after walking 4 minutes with rollator outside. UE fatigue caused by him leaning too much onto his hands on the rollator.  He needs cues to stay closer to the rollator, bend his elbows, and stand more erect. Wife plans to call [facility] to set up a visit and assessment in the next week. I sent her links to taller rollators because his current rollator is a little low for him. It causes him to bend forward and lock out his elbows.   Notable posterior instability. He tends to stand and walk on his heels primarily which reduces his stability. Poor posterior balance reactions noted. He will work on this via new exercise for home. Increased safety and control during sit to stands.   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   Plan:  Continue skilled PT per plan of care to minimize fall risk.     Current Problem  1. Recurrent falls        2. Parkinson's disease, unspecified whether dyskinesia present, unspecified whether manifestations fluctuate  Follow Up In Physical Therapy      3. Progressive gait disorder  Follow Up In Physical Therapy          General  Subjective    Mrs. Frankel asks if her  can keep coming to PT once a week after next week's visit. Patient reports no new issues or complains. No falls since last visit.     Precautions  High fall risk  Hx of 2 extensive lumbar surgeries - decompression / fusion  Pain  Chronic lower back pain. He states that the exercises help his back pain.     Objective     Treatments:  Not Performed today:  - seated left ankle inversion w/ TB. Red. 2 x 10  - seated left ankle  eversion w/TB. Red 2 x 10  - supine elbow press postural stretching. 5s holds x 10  - bridging with Legs on t-ball. 5s x 15  - seated hamstring stretching. 30s x 3 ea    Neuromuscular Re-Education:  - sit to stands from raises mat with feet on rockarboard. X 20 total  - retrostepping over ladder strip. X 15 ea on floor, x 15 ea on foam (much more challenging)  - TUG + figure 8 activity x 5 minutes  - standing balance on incline. Eyes closed 30s x 3, head nods 20s x 3  - posterior lean in place training working on bigger/stronger posterior stepping strategy  Not performed today:  - 6 inch step taps. 2 x 15 ea  - 360 degree turns in corner with supervision.   - heel toe rocking with back to wall.   - lateral step and reach x 10 ea    Gait training:   - Ambulated a total of x 8 minutes mostly outdoors and less indoors with rollator to assess his gait and safety with rollator and work on longer distance ambulation. He notes fatigue after 4 minutes but able to make it 8 minutes to return into clinic from the parking lot next door.   - Forward and backward ambulation without device. 30ft x 6 each    OP EDUCATION:      Goals  Active       PT Problem       Falls       Start:  11/14/23    Expected End:  03/01/24       Pt. Will report no falls in > 7 weeks. Pt will be able to restate fall precaution measures and will have implemented these measures appropriately to minimize fall risk.          Mini Best        Start:  11/14/23    Expected End:  03/01/24       Improve MiniBest score to > 18/28 to indicate improved multifactorial balance and reduced fall risk.          TUG        Start:  11/14/23    Expected End:  03/01/24       TUG to < 13 seconds, TUG cog/motor < 15.5 secs  Pt. Will demonstrate minimal negative impact on gait quality and safety during dual task mobility.         Balance       Start:  11/14/23    Expected End:  03/01/24       Half tandem w/ EC > 15 secs; eyes closed narrow base on foam > 15 secs; EC on incline  > 15 secs; half tandem with head turns > 20 seconds         Home program       Start:  11/14/23    Expected End:  03/01/24       Pt. Will be independent and compliant with home program. He will begin exercise at [facility] prior to discharge from PT.

## 2024-02-07 ENCOUNTER — APPOINTMENT (OUTPATIENT)
Dept: NEUROLOGY | Facility: CLINIC | Age: 74
End: 2024-02-07
Payer: MEDICARE

## 2024-02-12 ENCOUNTER — TREATMENT (OUTPATIENT)
Dept: PHYSICAL THERAPY | Facility: CLINIC | Age: 74
End: 2024-02-12
Payer: MEDICARE

## 2024-02-12 DIAGNOSIS — R26.9 PROGRESSIVE GAIT DISORDER: ICD-10-CM

## 2024-02-12 DIAGNOSIS — G20.A1 PARKINSON'S DISEASE, UNSPECIFIED WHETHER DYSKINESIA PRESENT, UNSPECIFIED WHETHER MANIFESTATIONS FLUCTUATE (MULTI): ICD-10-CM

## 2024-02-12 DIAGNOSIS — R29.6 RECURRENT FALLS: Primary | ICD-10-CM

## 2024-02-12 PROCEDURE — 97116 GAIT TRAINING THERAPY: CPT | Mod: GP | Performed by: PHYSICAL THERAPIST

## 2024-02-12 PROCEDURE — 97750 PHYSICAL PERFORMANCE TEST: CPT | Mod: GP | Performed by: PHYSICAL THERAPIST

## 2024-02-12 PROCEDURE — 97112 NEUROMUSCULAR REEDUCATION: CPT | Mod: GP | Performed by: PHYSICAL THERAPIST

## 2024-02-12 NOTE — PROGRESS NOTES
PHYSICAL THERAPY TREATMENT NOTE    Patient Name:  Thomas Frankel   Patient MRN: [id number]  Date: 02/12/24  Time Calculation  Start Time: 1330  Stop Time: 1415  Time Calculation (min): 45 min    Insurance:    Visit number: 7  Insurance Type: Medicare   Medicare Certification: 11/14/23 to 2/12/24    Therapy diagnoses:   1. Recurrent falls        2. Parkinson's disease, unspecified whether dyskinesia present, unspecified whether manifestations fluctuate  Follow Up In Physical Therapy      3. Progressive gait disorder  Follow Up In Physical Therapy           General:  Reason for visit: PD, imbalance, falls  Referred by: Kam Chamorro, CNP  Next MD appt:      Assessment:  Pt. And wife are agreeable to discharge from PT today.  They understand that the key for him to minimize his fall risk, delay PD regression, and maximize his functional capacity is to perform daily exercise and increase his activity level. To this point he has not been compliant with his HEP, seemingly due to a lack of motivation and urgency.  He has a comprehensive HEP to work on balance, balance reactions, quality of movement, and strengthening.  I advised that he start to attend PD exercise at [facility], which he and his wife are in favor of.    The biggest concern is his retropulsion and posterior instability along with reduced balance reactions which place him at high risk to fall. His last fall was about a month ago.  There is a high likelihood given his progressive neurological disease and fall risk that he will return to therapy in the future.     Plan:  Discharge from PT today.     Precautions:  High fall risk; Hx of falls.   Fall Risk: High    Subjective:   Patient reports    Pain (0-10): Chronic LBP (not part of treatment plan). Hx of 2 extensive lumbar surgeries - decompression / fusion  HEP adherence / understanding: Fair/good understanding.  Poor adherence.      Objective    Treatment Performed:   PT Evaluation Time Entry  Physical Performance Test (with Report) Time Entry: 20  PT Therapeutic Procedures Time Entry  Neuromuscular Re-Education Time Entry: 15  Gait Training Time Entry: 10       Physical Performance Test and Measures:  Re-Assessment on 2/12/24  TUG = 10.97 secs; TUG motor = 13.18 secs holding cup of water  MiniBest score = 19/28 improved from 14/28  Half tandem with head turns > 20 secs (goal met), half tandem with eyes closed = 10 secs avg (not met)  Narrow base on foam with EC = 8 secs (not met)  Neuromuscular Re-Education:  - 360 degree turns in corner with supervision.   - heel toe rocking with back to wall.   - standing balance on incline. Eyes closed 30s x 3, head nods 20s x 3  - posterior lean in place training working on bigger/stronger posterior stepping strategy    Gait training:   - Ambulation with intermittent 180 degree turns  - Forward and backward ambulation without device. 30ft x 6 each

## 2024-02-23 ENCOUNTER — TELEPHONE (OUTPATIENT)
Dept: NEUROLOGY | Facility: HOSPITAL | Age: 74
End: 2024-02-23
Payer: MEDICARE

## 2024-02-23 NOTE — TELEPHONE ENCOUNTER
She would rather see Kam than [facility] or go to the PCP. He has a cough, dehydrated, constipation. All she stated are related to his PD. HE has not seen PCP since November. He has had a significant rapid decline. His complaints are just that he doesn't feel well. No motor issues, he just feels ill.

## 2024-02-26 ENCOUNTER — APPOINTMENT (OUTPATIENT)
Dept: NEUROLOGY | Facility: CLINIC | Age: 74
End: 2024-02-26
Payer: MEDICARE

## 2024-03-19 ENCOUNTER — APPOINTMENT (OUTPATIENT)
Dept: NEUROLOGY | Facility: CLINIC | Age: 74
End: 2024-03-19
Payer: MEDICARE

## 2024-04-09 ENCOUNTER — APPOINTMENT (OUTPATIENT)
Dept: NEUROLOGY | Facility: CLINIC | Age: 74
End: 2024-04-09
Payer: MEDICARE

## 2024-04-12 NOTE — PROGRESS NOTES
Subjective     Thomas Frankel is a 73 y.o. year old male who presents with Parkinson's Disease, here for follow up visit.    HPI    Last visit w/ Dr. Sutton was 7/5/24.    He comes with wife.  DBS is still helping, just not as much as in the past, has had it for 15 yrs, PD for 30yrs.     Notes a chronic cough, not r/t drooling or eating/drinking.       MOTOR SYMPTOMS      +/-                            Comments  Motor sx overall     Tremor + W/ wearing off meds   Rigidity -    Bradykinesia +    Balance/gait + Cane all the time  Careful not to fall  L ankle frx makes L foot turn out (he had a plate/surgery)   FOG -    Falls - >1yr ago   PT - Completed PT after last visit and maxed out on improvement but was doing well   Exercise + Better Every Day plans for 2-3x/week     NON MOTOR SYMPTOMS       +/-                               Comments  Orthostatic lightheadedness  - On Florinef   Cognitive - MCI 5 yrs ago and unchanged per he and wife   Insomnia + Wakes q4hrs to take meds  Sleeps a lot during the day and goes to be early   RBD + Speaks a lot   Depression -    Anxiety -    Fatigue + Naps a lot during the day   Sialorrhea + When he sleeps   Hypophonia     Dysphagia -    Constipation + BM q3-4days  Enema  Not drinking hardly any water  Mg citrate at hs but does not always work  Miralax and Senna  Asking about Flax and gonzalo seeds   Urinary dysfunction + Urinary IC, nocturia 3-4x/ night  Enlarged testicle and monitoring  Oxybutynin, Myrbetriq        Social  Lives with: wife  Help with ADLs: wife drives          [facility] Meds  Med Dose Time   Stalevo 150mg  1 tab 6 x/day  (prescribed 5x/day) ([id number])    Taking middle of the night dose 2am- states he has been doing it for so long he is unsure if he neds it--body wakes him up    Bedtime is 8-9pm    Donepezil 10mg 1 tab in the am         Latency 1hr    Wearing off Sometimes wears off    Side effects     Dyskinesia None    Hallucinations  None    Other None          Patient Health Questionnaire-2 Score: 0            Current Outpatient Medications:     acetaminophen (Tylenol) 325 mg tablet, Take by mouth every 4 hours., Disp: , Rfl:     atorvastatin (Lipitor) 20 mg tablet, Take 1 tablet (20 mg) by mouth once daily., Disp: 90 tablet, Rfl: 3    calcium carbonate-vitamin D3 600 mg-10 mcg (400 unit) tablet, Take by mouth twice a day., Disp: , Rfl:     carbidopa-levodopa-entacapone (Stalevo) 37.5-150-200 mg tablet, Take one tablet 5 times a day, Disp: 150 tablet, Rfl: 11    chlorhexidine (Hibiclens) 4 % external liquid, Apply topically once daily as needed for wound care., Disp: 236 mL, Rfl: 0    cholecalciferol (Vitamin D-3) 50 MCG (2000 UT) tablet, Take 1 tablet (50 mcg) by mouth once daily., Disp: , Rfl:     cyanocobalamin (Vitamin B-12) 1,000 mcg tablet, Take 1 tablet (1,000 mcg) by mouth once daily., Disp: , Rfl:     donepezil (Aricept) 10 mg tablet, TAKE 1 TABLET BY MOUTH ONCE DAILY. AT BREAKFAST, Disp: , Rfl:     fludrocortisone (Florinef) 0.1 mg tablet, Take 1 tablet (0.1 mg) by mouth once daily in the morning. Take before meals., Disp: , Rfl:     Myrbetriq 25 mg tablet extended release 24 hr 24 hr tablet, Take 1 tablet (25 mg) by mouth once daily., Disp: , Rfl:     OXcarbazepine (Trileptal) 300 mg tablet, Take 2 tablets (600 mg) by mouth in the morning and 2 tablets (600 mg) before bedtime., Disp: , Rfl:     oxybutynin (Ditropan) 5 mg tablet, , Disp: , Rfl:     polyethylene glycol (Glycolax) 17 gram/dose powder, Take 17 g by mouth., Disp: , Rfl:     sennosides (Senokot) 8.6 mg tablet, Take 1 tablet (8.6 mg) by mouth once daily., Disp: , Rfl:        Objective   Vitals:    04/15/24 1337 04/15/24 1338   BP: 136/79 132/78   BP Location: Right arm Right arm   Patient Position: Sitting Standing   BP Cuff Size: Adult Adult   Pulse: 88 99   Resp: 18    Weight: 95.3 kg (210 lb)                  Physical Exam    L ankle turns out when walking (hx  frx/surgery)   Decreased arm swing, stooped posture, walks holding cane up, pretty good stride/steps    MDS UPDRS 1st Score: Motor Examination  Is the patient on medication for treating the symptoms of Parkinson's Disease?: Yes  Patients receiving medication for treating the symptoms of Parkinson's Disease, shade the patient's clinical state.: On  Is the patient on Levodopa?: Yes  Speech: 2  Facial Expression: 3  Rigidty Neck: 2  Rigidty RUE: 3  Rigidity - LUE: 3  Rigidity RLE: 2  Rigidity LLE: 3  Finger Tapping Right Hand: 0  Finger Tapping Left Hand: 0  Hand Movements- Right Hand: 0  Hand Movements- Left Hand: 1  Pronatiaon-Supination Movments - Right Hand: 2  Pronatiaon-Supination Movments Left Hand: 3  Toe Tapping Right Foot: 2  Toe Tapping - Left Foot: 3  Leg Agility - Right Le  Leg Agility - Left le  Arising from Chair: 0  Gait: 2  Freezing of Gait: 0  Postural Stability: 0 (not tested)  Posture: 2  Global Spontanteity of Movment ( Body Bradykinesia): 3  Postural Tremor - Right Hand: 0  Postural Tremor - Left hand: 0  Kinetic Tremor - Right hand: 0  Kinetic Tremor - Left hand: 0  Rest Tremor Amplitude - RUE: 0  Rest Tremor Amplitude - LUE: 0  Rest Tremor Amplitude - RLE: 0  Rest Tremor Amplitude - LLE: 0  Rest Tremor Amplitude - Lip/Jaw: 0  Constancy of Rest Tremor: 0  MDS UPDRS Total Score: 40  Were dyskinesias (chorea or dystonia) present during examination?: No        DBS procedure  Metronic Activa SC 2023   L chest IPG/L STN- 2.87v  Impedances all OK- 560/6.2mA    L STN  C+2-3- 3.6/90/185      Medtronic Activa SC  R chest IPG/ R STN- 2.88v  Impedances 624/6.2mA    R STN  2+3- 3.95/90/185    No changes to stimulation above.      Assessment/Plan   Mr. Thomas Frankel is a 73 y.o. M with PD (dx , s/p bl STN Medtronic DBS in ), cognitive decline (one donepezil). Patient looks overall motorically good 30 years into PD on Stalevo, tolerating without SE or wearing off though he does dose at 2am.  Could consider CR Sinemet at hs but bedtime is Bedtime is 8-9pm so would replace bedtime Stalevo dose first and see if helpful if unable to get rid of 2am dose (see below).    IPGs replaced q18M per wife and concern the pocket was getting too thin since he has had DBS x 15 yrs. 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 Consider rechargable.     OH: He is on fludrocortisone, not sx    Daytime fatigue but nocturia 3-4x/night, wakes and takes 2am Stalevo (states he is unsure if he needs it but has been doing this for so long so will try and stop it. Encouraged urology f/u for nocturia interfering with sleep as well (and urinary IC, meds not helping).  Next, could consider sleep study to r/o sleep apnea.    RBD- 3mg melatonin not helpful, increase    Constipation: Afraid to drink water d/t urinary IC, nocturia which is greatly worsening since he is taking a good amount of meds for this. Again, rec urology follow up.       Diagnoses and all orders for this visit:  Parkinson's disease without dyskinesia, with fluctuating manifestations (Multi)      #Try taking Stalevo just 5x a day and holding the middle of the night to see if you can tolerate skipping that dose in addition to the melatonin to see if you can get better sleep    #Start melatonin 6mg at bedtime. In 2 weeks, if you are still having speaking/moving/dream enactment while sleeping, you can increase to 12mg at night. If tolerated, try taking consistently for several weeks to see if benefit--if not, then OK to stop taking.     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      #Continue donepezil 10mg daily    #For constipation continue bowel regimen and try below    As you increase your fiber, do this slowly, adding a few grams every 5-7 days to avoid stomach upset and or worsening of constipation.     -Adequate water EVERY DAY (at least eight 8oz glasses)  -regular exercise/being active is important  -bananas  "are constipating! So are refined grains - white rice, regular pasta (whole wheat pasta and brown rice ok)  -eat 2.5 tbsp of ground flax seed and or gonzalo seeds per day  -consider taking 1 tbsp of olive oil per day  -if you drink coffee in the morning, try to go every morning after your coffee (it is a laxative)  -you can also try a probiotic, which you can find at natural food stores or vitamin stores, or online, or you can get through foods. Foods with probiotics include yogurt and fermented foods like pickles, sauerkraut, kimchee-but read the label. There is also a juice called \"Good Belly\" and a drink called \"Kefir\" with probiotics. One supplemental brand is Renew Life. If you do this, make sure you store it in a cool dark place, as light damages it. If a low-concentration formula doesn't work, you may respond to a higher concentration.   -smooth move tea at night - the longer you steep, the stronger it is, so be careful   -if this is not enough, call my office for further instructions    #Let PCP know about cough (since not related to drooling or swallowing)    #Discuss if you are a candidate for Botox with urologist since having urinary incontinence and nocturia (urinating at night)    Double check if you are supposed to take either Myrbetriq or oxybutynin (not usually both at once)    #Follow up as scheduled with Dr. Shaikh PEREZ, JERALD Chamorro, personally performed  a medically appropriate exam, discussion of care/treatment options taking a total time of 44minutes for today's visit.      Kam Chamorro NP-C  Adult/Gerontological Nurse Practitioner   [facility], Department of Neurology  [facility] [place]  [facility]  [address] [place] [address]  Phone: [phone]  Fax: [phone]  "

## 2024-04-15 ENCOUNTER — PROCEDURE VISIT (OUTPATIENT)
Dept: NEUROLOGY | Facility: CLINIC | Age: 74
End: 2024-04-15
Payer: MEDICARE

## 2024-04-15 VITALS
RESPIRATION RATE: 18 BRPM | SYSTOLIC BLOOD PRESSURE: 132 MMHG | DIASTOLIC BLOOD PRESSURE: 78 MMHG | BODY MASS INDEX: 28.48 KG/M2 | HEART RATE: 99 BPM | WEIGHT: 210 LBS

## 2024-04-15 DIAGNOSIS — G20.A2 PARKINSON'S DISEASE WITHOUT DYSKINESIA, WITH FLUCTUATING MANIFESTATIONS (MULTI): Primary | ICD-10-CM

## 2024-04-15 PROCEDURE — 95970 ALYS NPGT W/O PRGRMG: CPT | Performed by: NURSE PRACTITIONER

## 2024-04-15 PROCEDURE — 99215 OFFICE O/P EST HI 40 MIN: CPT | Performed by: NURSE PRACTITIONER

## 2024-04-15 ASSESSMENT — UNIFIED PARKINSONS DISEASE RATING SCALE (UPDRS)
RIGIDITY_LUE: 3
LEG_AGILITY_RIGHT: 2
RIGIDITY_RLE: 2
CLINICAL_STATE: ON
HANDMOVEMENTS_RIGHT: 0
POSTURAL_STABILITY: 0
LEVODOPA: YES
RIGIDITY_LLE: 3
GAIT: 2
POSTURE: 2
SPEECH: 2
KINETIC_TREMOR_RIGHTHAND: 0
POSTURAL_TREMOR_LEFTHAND: 0
KINETIC_TREMOR_LEFTHAND: 0
DYSKINESIAS_PRESENT: NO
TOETAPPING_RIGHT: 2
AMPLITUDE_RUE: 0
PARKINSONS_MEDS: YES
RIGIDITY_NECK: 2
AMPLITUDE_RLE: 0
CHAIR_RISING_SCALE: 0
FINGER_TAPPING_RIGHT: 0
AMPLITUDE_LUE: 0
FACIAL_EXPRESSION: 3
POSTURAL_TREMOR_RIGHTHAND: 0
AMPLITUDE_LLE: 0
TOETAPPING_LEFT: 3
TOTAL_SCORE: 40
LEG_AGILITY_LEFT: 2
FREEZING_GAIT: 0
PRONATION_SUPINATION_RIGHT: 2
RIGIDITY_RUE: 3
PRONATION_SUPINATION_LEFT: 3
SPONTANEITY_OF_MOVEMENT: 3
FINGER_TAPPING_LEFT: 0
CONSTANCY_TREMOR_ATREST: 0
AMPLITUDE_LIP_JAW: 0

## 2024-04-15 ASSESSMENT — PATIENT HEALTH QUESTIONNAIRE - PHQ9
1. LITTLE INTEREST OR PLEASURE IN DOING THINGS: NOT AT ALL
2. FEELING DOWN, DEPRESSED OR HOPELESS: NOT AT ALL
SUM OF ALL RESPONSES TO PHQ9 QUESTIONS 1 AND 2: 0

## 2024-04-15 ASSESSMENT — ENCOUNTER SYMPTOMS
OCCASIONAL FEELINGS OF UNSTEADINESS: 1
LOSS OF SENSATION IN FEET: 0
DEPRESSION: 0

## 2024-04-15 NOTE — PATIENT INSTRUCTIONS
"  #Try taking Stalevo just 5x a day and holding the middle of the night to see if you can tolerate skipping that dose in addition to the melatonin to see if you can get better sleep    #Start melatonin 6mg at bedtime. In 2 weeks, if you are still having speaking/moving/dream enactment while sleeping, you can increase to 12mg at night. If tolerated, try taking consistently for several weeks to see if benefit--if not, then OK to stop taking.     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      #Continue donepezil 10mg daily    #For constipation continue bowel regimen and try below    As you increase your fiber, do this slowly, adding a few grams every 5-7 days to avoid stomach upset and or worsening of constipation.     -Adequate water EVERY DAY (at least eight 8oz glasses)  -regular exercise/being active is important  -bananas are constipating! So are refined grains - white rice, regular pasta (whole wheat pasta and brown rice ok)  -eat 2.5 tbsp of ground flax seed and or gonzalo seeds per day  -consider taking 1 tbsp of olive oil per day  -if you drink coffee in the morning, try to go every morning after your coffee (it is a laxative)  -you can also try a probiotic, which you can find at natural food stores or vitamin stores, or online, or you can get through foods. Foods with probiotics include yogurt and fermented foods like pickles, sauerkraut, kimchee-but read the label. There is also a juice called \"Good Belly\" and a drink called \"Kefir\" with probiotics. One supplemental brand is Renew Life. If you do this, make sure you store it in a cool dark place, as light damages it. If a low-concentration formula doesn't work, you may respond to a higher concentration.   -smooth move tea at night - the longer you steep, the stronger it is, so be careful   -if this is not enough, call my office for further instructions    #Let PCP know about cough (since not related to drooling or " swallowing)    #Discuss if you are a candidate for Botox with urologist since having urinary incontinence and nocturia (urinating at night)    Double check if you are supposed to take either Myrbetriq or oxybutynin (not usually both at once)    #Follow up as scheduled with Dr. Shaikh Kam Chamorro, NP-C  Adult/Gerontological Nurse Practitioner   [facility], Department of Neurology  [facility] [place]  [facility]  [address] [place] [address]  Phone: [phone]  Fax: [phone]

## 2024-05-01 ENCOUNTER — OFFICE VISIT (OUTPATIENT)
Dept: PRIMARY CARE | Facility: CLINIC | Age: 74
End: 2024-05-01
Payer: MEDICARE

## 2024-05-01 VITALS
HEART RATE: 103 BPM | DIASTOLIC BLOOD PRESSURE: 73 MMHG | TEMPERATURE: 97.6 F | SYSTOLIC BLOOD PRESSURE: 128 MMHG | WEIGHT: 207.2 LBS | BODY MASS INDEX: 28.1 KG/M2

## 2024-05-01 DIAGNOSIS — G20.B1 PARKINSON'S DISEASE WITH DYSKINESIA WITHOUT FLUCTUATING MANIFESTATIONS (MULTI): ICD-10-CM

## 2024-05-01 DIAGNOSIS — R73.9 HYPERGLYCEMIA: ICD-10-CM

## 2024-05-01 DIAGNOSIS — I10 PRIMARY HYPERTENSION: Primary | ICD-10-CM

## 2024-05-01 DIAGNOSIS — F10.20 ALCOHOL USE DISORDER, MODERATE, DEPENDENCE (MULTI): ICD-10-CM

## 2024-05-01 DIAGNOSIS — E55.9 MILD VITAMIN D DEFICIENCY: ICD-10-CM

## 2024-05-01 DIAGNOSIS — Z13.1 SCREENING FOR DIABETES MELLITUS: ICD-10-CM

## 2024-05-01 DIAGNOSIS — E78.2 MIXED HYPERLIPIDEMIA: ICD-10-CM

## 2024-05-01 DIAGNOSIS — R74.8 ABNORMAL LIVER ENZYMES: ICD-10-CM

## 2024-05-01 DIAGNOSIS — D64.9 CHRONIC ANEMIA: ICD-10-CM

## 2024-05-01 PROCEDURE — 3074F SYST BP LT 130 MM HG: CPT | Performed by: INTERNAL MEDICINE

## 2024-05-01 PROCEDURE — 3078F DIAST BP <80 MM HG: CPT | Performed by: INTERNAL MEDICINE

## 2024-05-01 PROCEDURE — 1036F TOBACCO NON-USER: CPT | Performed by: INTERNAL MEDICINE

## 2024-05-01 PROCEDURE — 1126F AMNT PAIN NOTED NONE PRSNT: CPT | Performed by: INTERNAL MEDICINE

## 2024-05-01 PROCEDURE — 1159F MED LIST DOCD IN RCRD: CPT | Performed by: INTERNAL MEDICINE

## 2024-05-01 PROCEDURE — 99214 OFFICE O/P EST MOD 30 MIN: CPT | Performed by: INTERNAL MEDICINE

## 2024-05-01 PROCEDURE — 1160F RVW MEDS BY RX/DR IN RCRD: CPT | Performed by: INTERNAL MEDICINE

## 2024-05-01 RX ORDER — ATORVASTATIN CALCIUM 20 MG/1
20 TABLET, FILM COATED ORAL DAILY
Qty: 90 TABLET | Refills: 3 | Status: SHIPPED | OUTPATIENT
Start: 2024-05-01 | End: 2025-05-01

## 2024-05-01 ASSESSMENT — PAIN SCALES - GENERAL: PAINLEVEL: 0-NO PAIN

## 2024-05-01 ASSESSMENT — ENCOUNTER SYMPTOMS
CHILLS: 0
POLYDIPSIA: 0
FEVER: 0
SHORTNESS OF BREATH: 0
COUGH: 0
PALPITATIONS: 0

## 2024-05-01 NOTE — PROGRESS NOTES
Subjective   Patient ID: Thomas Frankel is a 73 y.o. male who presents for Follow-up (6 month ).    2 fallls since last encounter, slip on wet floor x1 and one time falling forward in a closet.     Skin infection issues resolved. DC hibiclen's    Continues to follow with parkinson's specialty team. No recent medication changes.    Rationing the myrbetriq, can afford it. Advised for better symptom control to consider rx increase to daily.    [facility] and [facility].     PT not driving due to parkinson's advised about medical transport for procedures and PT and medical visits.     RSV completed 11/23    Advised on covid booster every 6 months.           Review of Systems   Constitutional:  Negative for chills and fever.   Respiratory:  Negative for cough and shortness of breath.    Cardiovascular:  Negative for chest pain and palpitations.   Endocrine: Negative for polydipsia and polyuria.       Objective   /73   Pulse 103   Temp 36.4 °C (97.6 °F) (Temporal)   Wt 94 kg (207 lb 3.2 oz)   BMI 28.10 kg/m²     Physical Exam  Constitutional:       Appearance: Normal appearance.   HENT:      Head: Normocephalic and atraumatic.   Eyes:      Extraocular Movements: Extraocular movements intact.      Pupils: Pupils are equal, round, and reactive to light.   Neck:      Thyroid: No thyroid mass or thyromegaly.      Vascular: No carotid bruit.   Cardiovascular:      Rate and Rhythm: Normal rate and regular rhythm.      Heart sounds: No murmur heard.     No friction rub. No gallop.   Pulmonary:      Effort: No respiratory distress.      Breath sounds: No wheezing, rhonchi or rales.   Musculoskeletal:      Cervical back: Neck supple.      Right lower leg: No edema.      Left lower leg: No edema.   Lymphadenopathy:      Cervical: No cervical adenopathy.   Neurological:      Mental Status: He is alert.         Assessment/Plan   Problem List Items Addressed This Visit             ICD-10-CM    Mixed  hyperlipidemia E78.2    Relevant Medications    atorvastatin (Lipitor) 20 mg tablet    Other Relevant Orders    Vitamin D 25-Hydroxy,Total (for eval of Vitamin D levels)    Lipid Panel    CBC    Comprehensive Metabolic Panel    Hemoglobin A1C    Hypertension - Primary I10    Relevant Orders    Vitamin D 25-Hydroxy,Total (for eval of Vitamin D levels)    Lipid Panel    CBC    Comprehensive Metabolic Panel    Hemoglobin A1C    Parkinson's disease (Multi) G20.A1     Other Visit Diagnoses         Codes    Abnormal liver enzymes     R74.8    Relevant Medications    atorvastatin (Lipitor) 20 mg tablet    Other Relevant Orders    Vitamin D 25-Hydroxy,Total (for eval of Vitamin D levels)    Lipid Panel    CBC    Comprehensive Metabolic Panel    Hemoglobin A1C    Alcohol use disorder, moderate, dependence (Multi)     F10.20    Relevant Medications    atorvastatin (Lipitor) 20 mg tablet    Chronic anemia     D64.9    Relevant Medications    atorvastatin (Lipitor) 20 mg tablet    Mild vitamin D deficiency     E55.9    Relevant Orders    Vitamin D 25-Hydroxy,Total (for eval of Vitamin D levels)    Lipid Panel    CBC    Comprehensive Metabolic Panel    Hemoglobin A1C    Screening for diabetes mellitus     Z13.1    Relevant Orders    Vitamin D 25-Hydroxy,Total (for eval of Vitamin D levels)    Lipid Panel    CBC    Comprehensive Metabolic Panel    Hemoglobin A1C    Hyperglycemia     R73.9    Relevant Orders    Vitamin D 25-Hydroxy,Total (for eval of Vitamin D levels)    Lipid Panel    CBC    Comprehensive Metabolic Panel    Hemoglobin A1C

## 2024-06-25 ENCOUNTER — LAB (OUTPATIENT)
Dept: LAB | Facility: LAB | Age: 74
End: 2024-06-25
Payer: MEDICARE

## 2024-06-25 DIAGNOSIS — E78.2 MIXED HYPERLIPIDEMIA: ICD-10-CM

## 2024-06-25 DIAGNOSIS — I10 PRIMARY HYPERTENSION: ICD-10-CM

## 2024-06-25 DIAGNOSIS — R73.9 HYPERGLYCEMIA: ICD-10-CM

## 2024-06-25 DIAGNOSIS — Z13.1 SCREENING FOR DIABETES MELLITUS: ICD-10-CM

## 2024-06-25 DIAGNOSIS — E55.9 MILD VITAMIN D DEFICIENCY: ICD-10-CM

## 2024-06-25 DIAGNOSIS — R74.8 ABNORMAL LIVER ENZYMES: ICD-10-CM

## 2024-06-25 LAB
25(OH)D3 SERPL-MCNC: 33 NG/ML (ref 30–100)
ALBUMIN SERPL BCP-MCNC: 4.4 G/DL (ref 3.4–5)
ALP SERPL-CCNC: 86 U/L (ref 33–136)
ALT SERPL W P-5'-P-CCNC: 10 U/L (ref 10–52)
ANION GAP SERPL CALC-SCNC: 13 MMOL/L (ref 10–20)
AST SERPL W P-5'-P-CCNC: 13 U/L (ref 9–39)
BILIRUB SERPL-MCNC: 0.6 MG/DL (ref 0–1.2)
BUN SERPL-MCNC: 19 MG/DL (ref 6–23)
CALCIUM SERPL-MCNC: 9.7 MG/DL (ref 8.6–10.6)
CHLORIDE SERPL-SCNC: 104 MMOL/L (ref 98–107)
CHOLEST SERPL-MCNC: 226 MG/DL (ref 0–199)
CHOLESTEROL/HDL RATIO: 4.1
CO2 SERPL-SCNC: 28 MMOL/L (ref 21–32)
CREAT SERPL-MCNC: 0.91 MG/DL (ref 0.5–1.3)
EGFRCR SERPLBLD CKD-EPI 2021: 88 ML/MIN/1.73M*2
ERYTHROCYTE [DISTWIDTH] IN BLOOD BY AUTOMATED COUNT: 11.9 % (ref 11.5–14.5)
EST. AVERAGE GLUCOSE BLD GHB EST-MCNC: 108 MG/DL
GLUCOSE SERPL-MCNC: 102 MG/DL (ref 74–99)
HBA1C MFR BLD: 5.4 %
HCT VFR BLD AUTO: 44.7 % (ref 41–52)
HDLC SERPL-MCNC: 54.5 MG/DL
HGB BLD-MCNC: 15.3 G/DL (ref 13.5–17.5)
LDLC SERPL CALC-MCNC: 145 MG/DL
MCH RBC QN AUTO: 32.1 PG (ref 26–34)
MCHC RBC AUTO-ENTMCNC: 34.2 G/DL (ref 32–36)
MCV RBC AUTO: 94 FL (ref 80–100)
NON HDL CHOLESTEROL: 172 MG/DL (ref 0–149)
NRBC BLD-RTO: 0 /100 WBCS (ref 0–0)
PLATELET # BLD AUTO: 229 X10*3/UL (ref 150–450)
POTASSIUM SERPL-SCNC: 4.6 MMOL/L (ref 3.5–5.3)
PROT SERPL-MCNC: 7 G/DL (ref 6.4–8.2)
RBC # BLD AUTO: 4.76 X10*6/UL (ref 4.5–5.9)
SODIUM SERPL-SCNC: 140 MMOL/L (ref 136–145)
TRIGL SERPL-MCNC: 135 MG/DL (ref 0–149)
VLDL: 27 MG/DL (ref 0–40)
WBC # BLD AUTO: 5 X10*3/UL (ref 4.4–11.3)

## 2024-06-25 PROCEDURE — 85027 COMPLETE CBC AUTOMATED: CPT

## 2024-06-25 PROCEDURE — 82306 VITAMIN D 25 HYDROXY: CPT

## 2024-06-25 PROCEDURE — 83036 HEMOGLOBIN GLYCOSYLATED A1C: CPT

## 2024-06-25 PROCEDURE — 80053 COMPREHEN METABOLIC PANEL: CPT

## 2024-06-25 PROCEDURE — 36415 COLL VENOUS BLD VENIPUNCTURE: CPT

## 2024-06-25 PROCEDURE — 80061 LIPID PANEL: CPT

## 2024-07-16 NOTE — PROGRESS NOTES
Subjective     Thomas Frankel is a 73 y.o. year old male who presents with Parkinson's Disease, here for follow up visit.    HPI  Last visit w/me 4/15/24.   He comes with wife.    Overall he is doing well.     Sleeping all day. Tried to stop taking Stalevo in the night but did not feel as good.   Sleeps 2hrs during the day and 9pm-5am, this a gradual increase. Snores, once thought he was choking in the sleep--she stays in the other room. 10-15yrs ago.     Voice is getting softer, declines ST.    LBP is becoming more bothersome. Overall sitting a lot wife notes, he is not exercising like he did previously, he also notes he does not like to leave the house because he is nervous to fall. Has seen an ortho for his back years ago for LBP, he did not recommend surgery, worse from posture leaning back and watching TV per wife. Back pain does not improve with PD meds but improves once he gets going in the am. Denies paresthesias in groin or down legs, pain does not radiate down legs. 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 He has had back pain since then. He was fentanyl patch, then morphine patch--he was a mess on this and stopped a while back. He also tried injections for the pain. He wants to go back to pain management.       MOTOR SYMPTOMS      +/-                            Comments  Motor sx overall  Stable   Tremor + W/ wearing off meds   Rigidity -    Bradykinesia -    Balance/gait + Cane all the time  Careful not to fall--goes up and down stairs  L ankle frx makes L foot turn out (he had a plate/surgery)   FOG -    Falls - >1yr ago but worries about falling and worried to leave the house bc of this   PT -    Exercise + [facility] Every Day at [employer] sporadically, once a week     NON MOTOR SYMPTOMS       +/-                               Comments  Orthostatic lightheadedness  - On Florinef    Cognitive - MCI 5 yrs ago and slightly worse, sometimes taking longer   Insomnia + Wakes q4hrs to take meds  Sleeps a lot during the day and goes to be early   RBD + Speaks a lot sometimes 1-2W   Depression + Little depressed, isolated but get along well with wife.   Anxiety -    Fatigue + Naps a lot during the day--2hrs or more   Sialorrhea + Not as much   Hypophonia + Worse as above   Dysphagia -    Constipation + Better q3days, not drinking enough water but also juices but only a few glasses, worried about urinating  Mg citrate PRN  Miralax and Senna   Urinary dysfunction + Urinary IC, nocturia 4-5x/ night  Enlarged testicle and monitoring  Oxybutynin, Myrbetriq--he is on both per PCP  Pending         Social  Lives with: wife  Help with ADLs: wife drives          [facility] Meds  Med Dose Time   Stalevo 150mg  1 tab 6 x/day  (prescribed 5x/day) ([id number])    Taking middle of the night dose 2am- states he has been doing it for so long he is unsure if he neds it--body wakes him up    Bedtime is 8-9pm    Donepezil 10mg 1 tab in the am    Fludrocortisone 0.1mg 1 tab daily    Latency 1hr    Wearing off None    Side effects     Dyskinesia None    Hallucinations None    Other None          Patient Health Questionnaire-2 Score: 2            Current Outpatient Medications:     acetaminophen (Tylenol) 325 mg tablet, Take by mouth every 4 hours., Disp: , Rfl:     atorvastatin (Lipitor) 20 mg tablet, Take 1 tablet (20 mg) by mouth once daily., Disp: 90 tablet, Rfl: 3    calcium carbonate-vitamin D3 600 mg-10 mcg (400 unit) tablet, Take by mouth twice a day., Disp: , Rfl:     carbidopa-levodopa-entacapone (Stalevo) 37.5-150-200 mg tablet, Take one tablet 5 times a day, Disp: 150 tablet, Rfl: 11    cholecalciferol (Vitamin D-3) 50 MCG (2000 UT) tablet, Take 1 tablet (50 mcg) by mouth once daily., Disp: , Rfl:     cyanocobalamin (Vitamin B-12) 1,000 mcg tablet, Take 1 tablet (1,000 mcg) by mouth  once daily., Disp: , Rfl:     donepezil (Aricept) 10 mg tablet, TAKE 1 TABLET BY MOUTH ONCE DAILY. AT BREAKFAST, Disp: , Rfl:     fludrocortisone (Florinef) 0.1 mg tablet, Take 1 tablet (0.1 mg) by mouth once daily in the morning. Take before meals., Disp: , Rfl:     Myrbetriq 25 mg tablet extended release 24 hr 24 hr tablet, Take 1 tablet (25 mg) by mouth once daily., Disp: , Rfl:     OXcarbazepine (Trileptal) 300 mg tablet, Take 2 tablets (600 mg) by mouth in the morning and 2 tablets (600 mg) before bedtime., Disp: , Rfl:     oxybutynin (Ditropan) 5 mg tablet, , Disp: , Rfl:     polyethylene glycol (Glycolax) 17 gram/dose powder, Take 17 g by mouth., Disp: , Rfl:        Objective   Vitals:    24 1332 24 1333   BP: 138/87 122/76   BP Location: Right arm Right arm   Patient Position: Sitting Standing   BP Cuff Size: Adult Adult   Pulse: 93 102   Resp: 16    Weight: 93.9 kg (207 lb)                    Physical Exam        MDS UPDRS 1st Score: Motor Examination  Is the patient on medication for treating the symptoms of Parkinson's Disease?: Yes  Patients receiving medication for treating the symptoms of Parkinson's Disease, shade the patient's clinical state.: On  Is the patient on Levodopa?: Yes  Speech: 2  Facial Expression: 2  Rigidty Neck: 2  Rigidty RUE: 2  Rigidity - LUE: 2  Rigidity RLE: 0  Rigidity LLE: 1  Finger Tapping Right Hand: 0  Finger Tapping Left Hand: 0  Hand Movements- Right Hand: 1  Hand Movements- Left Hand: 1  Pronatiaon-Supination Movments - Right Hand: 1  Pronatiaon-Supination Movments Left Hand: 3  Toe Tapping Right Foot: 1  Toe Tapping - Left Foot: 2  Leg Agility - Right Le  Leg Agility - Left le  Arising from Chair: 1  Gait: 0  Freezing of Gait: 0  Postural Stability: 0  Posture: 2  Global Spontanteity of Movment ( Body Bradykinesia): 2  Postural Tremor - Right Hand: 0  Postural Tremor - Left hand: 0  Kinetic Tremor - Right hand: 0  Kinetic Tremor - Left hand: 0  Rest  Tremor Amplitude - RUE: 0  Rest Tremor Amplitude - LUE: 0  Rest Tremor Amplitude - RLE: 0  Rest Tremor Amplitude - LLE: 0  Rest Tremor Amplitude - Lip/Jaw: 0  Constancy of Rest Tremor: 0  MDS UPDRS Total Score: 28  Were dyskinesias (chorea or dystonia) present during examination?: No        DBS procedure  Metronic Activa SC 5/2023   L chest IPG/L STN- 2.83v  Impedances all OK-564/6.1 mA    L STN  C+2-3- 3.6/90/185 ((2.8-4.6))      Medtronic Activa SC  R chest IPG/ R STN- 2.83v   Impedances 615/6.3mA--all OK    R STN  2+3- 3.95/90/185  ((2.95-4.95))    No changes to stimulation above.      Assessment/Plan   Mr. Thomas Frankel is a 73 y.o. M with PD (dx 1994, s/p bl STN Medtronic DBS in 2006), cognitive decline (one donepezil). Patient looks overall motorically good 30 years into PD on Stalevo, tolerating without SE or wearing off though he does dose at 2am--tried stopping but could not tolerate. Could consider CR Sinemet at hs but bedtime is Bedtime is 8-9pm so would replace bedtime Stalevo dose first and see if helpful if unable to get rid of 2am dose (see below). He and wife prefer to keep regimen unchanged, can consider Rytary in the future if needed.     IPGs replaced q18M per wife and concern the pocket was getting too thin since he has had DBS x 15 yrs. 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 Consider rechargable. Discussed monitoring battery life.     [place]: He is on fludrocortisone, not sx but since previous neuro prescribed we will be taking over and should monitor [place] BPs to get a baseline.     Constipation- still an issue but hardly drinking any fluids d/t urinary issues.     Daytime fatigue but nocturia 3-4x/night, wakes and takes 2am Stalevo as above. Encouraged urology f/u for nocturia interfering with sleep as well (and urinary IC, meds not helping). Sleeping more and snoring + wife heard him coughing/choking in his sleep and so recommended sleep study to r/o sleep apnea- he declines,  risks of FARA discussed. RBD is infrequent.     Chronic LBP, hx of multiple surgeries, previously followed by pain management and would like to go back, no weakness on exam, denies paresthesias.     Diagnoses and all orders for this visit:  Parkinson's disease without dyskinesia, with fluctuating manifestations (Multi)  Orthostatic hypotension  Chronic low back pain without sciatica, unspecified back pain laterality  -     Referral to Pain Medicine; Future          #Monitor deep brain stimulator battery at least once a month as good habit. You will get a warning when opening your darcie when you are due for replacement and to contact our office asap when that occrs.    [employer] Patient Support  [phone]    #Consult pain management for chronic low back pain    #Try [employer] classes for voice  Visit [email or web address] for tip and recommendations of voice. '    Discussed speech therapy--let me know if you would like an order    #Reccommended sleep study for sleep apnea/snoring, please let me know if you would like an order      #Blood Pressure Monitoring: for dizziness and or low blood pressure    Doing this to monitor while on fludrocortisone    Sitting and standing blood pressure: Please take sitting blood pressure and heart rate and after standing up for 3 minutes while you are still standing take blood pressure and heart rate again  Lying blood pressure: as well as at bedtime after lying flat for 5 mins  Do this twice a day (mid morning and mid afternoon) for 1 week   Take the lying bedtime readings at bedtime  then send me the readings via [employer] or fax ([phone])      #Continue meds unchanged      Med Dose   Stalevo 150mg  1 tab 6 x/day  (prescribed 5x/day)   Donepezil 10mg 1 tab in the am   Fludrocortisone 0.1mg 1 tab daily       #For constipation increasing non-caffeine fluids and moving more may help.     I understand you have urinary issues impeding this, please discuss with urology.      #Discuss if you are a candidate for Botox with urologist since having urinary incontinence and nocturia (urinating at night)    #Cancel apt with Dr. Sutton and see me in 6M on a day he is also here (Dr. Sutton came in to see patient briefly today)       I, JERALD Chamorro, personally performed  a medically appropriate exam, discussion of care/treatment options taking a total time of 42minutes for today's visit.  No changes to DBS programming as noted above-checking settings, stimulator events, energy level, and impedances and updating settings in the chart.         Kam Chamorro, NP-C  Adult/Gerontological Nurse Practitioner   [facility], Department of Neurology  [facility]  [facility]  [id number] [place] [address]  Phone: [phone]  Fax: [phone]

## 2024-07-17 ENCOUNTER — APPOINTMENT (OUTPATIENT)
Dept: NEUROLOGY | Facility: CLINIC | Age: 74
End: 2024-07-17
Payer: MEDICARE

## 2024-07-17 VITALS
SYSTOLIC BLOOD PRESSURE: 122 MMHG | HEART RATE: 102 BPM | DIASTOLIC BLOOD PRESSURE: 76 MMHG | WEIGHT: 207 LBS | RESPIRATION RATE: 16 BRPM | BODY MASS INDEX: 28.07 KG/M2

## 2024-07-17 DIAGNOSIS — G89.29 CHRONIC LOW BACK PAIN WITHOUT SCIATICA, UNSPECIFIED BACK PAIN LATERALITY: ICD-10-CM

## 2024-07-17 DIAGNOSIS — M54.50 CHRONIC LOW BACK PAIN WITHOUT SCIATICA, UNSPECIFIED BACK PAIN LATERALITY: ICD-10-CM

## 2024-07-17 DIAGNOSIS — I95.1 ORTHOSTATIC HYPOTENSION: ICD-10-CM

## 2024-07-17 DIAGNOSIS — G20.A2 PARKINSON'S DISEASE WITHOUT DYSKINESIA, WITH FLUCTUATING MANIFESTATIONS (MULTI): Primary | ICD-10-CM

## 2024-07-17 PROCEDURE — 99215 OFFICE O/P EST HI 40 MIN: CPT | Performed by: NURSE PRACTITIONER

## 2024-07-17 PROCEDURE — 95970 ALYS NPGT W/O PRGRMG: CPT | Performed by: NURSE PRACTITIONER

## 2024-07-17 ASSESSMENT — UNIFIED PARKINSONS DISEASE RATING SCALE (UPDRS)
PRONATION_SUPINATION_LEFT: 3
RIGIDITY_LUE: 2
KINETIC_TREMOR_LEFTHAND: 0
RIGIDITY_LLE: 1
HANDMOVEMENTS_RIGHT: 1
POSTURAL_TREMOR_RIGHTHAND: 0
AMPLITUDE_LUE: 0
DYSKINESIAS_PRESENT: NO
TOTAL_SCORE: 28
LEG_AGILITY_LEFT: 2
FINGER_TAPPING_RIGHT: 0
CONSTANCY_TREMOR_ATREST: 0
SPEECH: 2
SPONTANEITY_OF_MOVEMENT: 2
GAIT: 0
TOETAPPING_RIGHT: 1
LEG_AGILITY_RIGHT: 1
LEVODOPA: YES
RIGIDITY_RLE: 0
PARKINSONS_MEDS: YES
TOETAPPING_LEFT: 2
RIGIDITY_RUE: 2
FREEZING_GAIT: 0
CLINICAL_STATE: ON
FACIAL_EXPRESSION: 2
POSTURAL_TREMOR_LEFTHAND: 0
RIGIDITY_NECK: 2
AMPLITUDE_RLE: 0
AMPLITUDE_LIP_JAW: 0
POSTURAL_STABILITY: 0
AMPLITUDE_RUE: 0
POSTURE: 2
CHAIR_RISING_SCALE: 1
PRONATION_SUPINATION_RIGHT: 1
AMPLITUDE_LLE: 0
FINGER_TAPPING_LEFT: 0
KINETIC_TREMOR_RIGHTHAND: 0

## 2024-07-17 ASSESSMENT — PATIENT HEALTH QUESTIONNAIRE - PHQ9
10. IF YOU CHECKED OFF ANY PROBLEMS, HOW DIFFICULT HAVE THESE PROBLEMS MADE IT FOR YOU TO DO YOUR WORK, TAKE CARE OF THINGS AT HOME, OR GET ALONG WITH OTHER PEOPLE: SOMEWHAT DIFFICULT
2. FEELING DOWN, DEPRESSED OR HOPELESS: MORE THAN HALF THE DAYS
1. LITTLE INTEREST OR PLEASURE IN DOING THINGS: NOT AT ALL
SUM OF ALL RESPONSES TO PHQ9 QUESTIONS 1 AND 2: 2

## 2024-07-17 ASSESSMENT — ENCOUNTER SYMPTOMS
LOSS OF SENSATION IN FEET: 0
OCCASIONAL FEELINGS OF UNSTEADINESS: 1
DEPRESSION: 1

## 2024-07-17 NOTE — PATIENT INSTRUCTIONS
#Monitor deep brain stimulator battery at least once a month as good habit. You will get a warning when opening your darcie when you are due for replacement and to contact our office asap when that occrs.    [employer] Patient Support  [phone]    #Consult pain management for chronic low back pain    #Try [employer] classes for voice  Visit [email or web address] for tip and recommendations of voice. '    Discussed speech therapy--let me know if you would like an order    #Reccommended sleep study for sleep apnea/snoring, please let me know if you would like an order      #Blood Pressure Monitoring: for dizziness and or low blood pressure    Doing this to monitor while on fludrocortisone    Sitting and standing blood pressure: Please take sitting blood pressure and heart rate and after standing up for 3 minutes while you are still standing take blood pressure and heart rate again  Lying blood pressure: as well as at bedtime after lying flat for 5 mins  Do this twice a day (mid morning and mid afternoon) for 1 week   Take the lying bedtime readings at bedtime  then send me the readings via [employer] or fax ([phone])      #Continue meds unchanged      Med Dose   Stalevo 150mg  1 tab 6 x/day  (prescribed 5x/day)   Donepezil 10mg 1 tab in the am   Fludrocortisone 0.1mg 1 tab daily       #For constipation increasing non-caffeine fluids and moving more may help.     I understand you have urinary issues impeding this, please discuss with urology.     #Discuss if you are a candidate for Botox with urologist since having urinary incontinence and nocturia (urinating at night)    #Cancel apt with Dr. Sutton and see me in 6M on a day he is also here      Kam Chamorro, NP-C  Adult/Gerontological Nurse Practitioner   [facility], Department of Neurology  [facility]  [facility]  [address] [place] [address]  Phone: [phone]  Fax:  [phone]

## 2024-08-05 ENCOUNTER — TELEPHONE (OUTPATIENT)
Dept: NEUROLOGY | Facility: CLINIC | Age: 74
End: 2024-08-05
Payer: MEDICARE

## 2024-08-05 NOTE — TELEPHONE ENCOUNTER
Germaine (wife) called. Jose Elias is having increased tremor with wearing off. Stalevo every 3 hours is not lasting long enough and it takes 30min + to kick in. He self increased his dose but that is not helping. He has DBS but per wife is maxed out on setting options. Please call Germaine [phone] (home)   With any suggestions.

## 2024-09-25 ENCOUNTER — APPOINTMENT (OUTPATIENT)
Dept: NEUROLOGY | Facility: CLINIC | Age: 74
End: 2024-09-25
Payer: MEDICARE

## 2024-11-06 ENCOUNTER — APPOINTMENT (OUTPATIENT)
Dept: PRIMARY CARE | Facility: CLINIC | Age: 74
End: 2024-11-06
Payer: MEDICARE

## 2025-01-02 ENCOUNTER — APPOINTMENT (OUTPATIENT)
Dept: PRIMARY CARE | Facility: CLINIC | Age: 75
End: 2025-01-02
Payer: MEDICARE

## 2025-01-09 ENCOUNTER — LAB (OUTPATIENT)
Dept: LAB | Facility: LAB | Age: 75
End: 2025-01-09
Payer: MEDICARE

## 2025-01-09 ENCOUNTER — APPOINTMENT (OUTPATIENT)
Dept: PRIMARY CARE | Facility: CLINIC | Age: 75
End: 2025-01-09
Payer: MEDICARE

## 2025-01-09 VITALS
HEART RATE: 112 BPM | SYSTOLIC BLOOD PRESSURE: 103 MMHG | HEIGHT: 72 IN | WEIGHT: 211 LBS | DIASTOLIC BLOOD PRESSURE: 56 MMHG | BODY MASS INDEX: 28.58 KG/M2 | TEMPERATURE: 96.8 F

## 2025-01-09 DIAGNOSIS — Z12.5 SCREENING FOR PROSTATE CANCER: ICD-10-CM

## 2025-01-09 DIAGNOSIS — Z00.00 ROUTINE GENERAL MEDICAL EXAMINATION AT HEALTH CARE FACILITY: ICD-10-CM

## 2025-01-09 DIAGNOSIS — D64.9 CHRONIC ANEMIA: ICD-10-CM

## 2025-01-09 DIAGNOSIS — E78.2 MIXED HYPERLIPIDEMIA: ICD-10-CM

## 2025-01-09 DIAGNOSIS — R74.8 ABNORMAL LIVER ENZYMES: ICD-10-CM

## 2025-01-09 DIAGNOSIS — G20.A1 PARKINSON'S DISEASE WITHOUT DYSKINESIA OR FLUCTUATING MANIFESTATIONS: ICD-10-CM

## 2025-01-09 DIAGNOSIS — Z00.00 ROUTINE GENERAL MEDICAL EXAMINATION AT HEALTH CARE FACILITY: Primary | ICD-10-CM

## 2025-01-09 DIAGNOSIS — Z12.11 ENCOUNTER FOR SCREENING FOR MALIGNANT NEOPLASM OF COLON: ICD-10-CM

## 2025-01-09 DIAGNOSIS — R05.3 CHRONIC COUGH: ICD-10-CM

## 2025-01-09 DIAGNOSIS — R29.6 RECURRENT FALLS: ICD-10-CM

## 2025-01-09 LAB
ALBUMIN SERPL BCP-MCNC: 4.5 G/DL (ref 3.4–5)
ALP SERPL-CCNC: 78 U/L (ref 33–136)
ALT SERPL W P-5'-P-CCNC: 7 U/L (ref 10–52)
ANION GAP SERPL CALC-SCNC: 15 MMOL/L (ref 10–20)
AST SERPL W P-5'-P-CCNC: 13 U/L (ref 9–39)
BASOPHILS # BLD AUTO: 0.05 X10*3/UL (ref 0–0.1)
BASOPHILS NFR BLD AUTO: 0.8 %
BILIRUB SERPL-MCNC: 0.5 MG/DL (ref 0–1.2)
BUN SERPL-MCNC: 17 MG/DL (ref 6–23)
CALCIUM SERPL-MCNC: 9.6 MG/DL (ref 8.6–10.6)
CHLORIDE SERPL-SCNC: 106 MMOL/L (ref 98–107)
CHOLEST SERPL-MCNC: 208 MG/DL (ref 0–199)
CHOLESTEROL/HDL RATIO: 4
CO2 SERPL-SCNC: 25 MMOL/L (ref 21–32)
CREAT SERPL-MCNC: 0.96 MG/DL (ref 0.5–1.3)
EGFRCR SERPLBLD CKD-EPI 2021: 82 ML/MIN/1.73M*2
EOSINOPHIL # BLD AUTO: 0.15 X10*3/UL (ref 0–0.4)
EOSINOPHIL NFR BLD AUTO: 2.4 %
ERYTHROCYTE [DISTWIDTH] IN BLOOD BY AUTOMATED COUNT: 12.3 % (ref 11.5–14.5)
GLUCOSE SERPL-MCNC: 120 MG/DL (ref 74–99)
HCT VFR BLD AUTO: 43.4 % (ref 41–52)
HDLC SERPL-MCNC: 52.6 MG/DL
HGB BLD-MCNC: 14.7 G/DL (ref 13.5–17.5)
IMM GRANULOCYTES # BLD AUTO: 0.01 X10*3/UL (ref 0–0.5)
IMM GRANULOCYTES NFR BLD AUTO: 0.2 % (ref 0–0.9)
LYMPHOCYTES # BLD AUTO: 1.64 X10*3/UL (ref 0.8–3)
LYMPHOCYTES NFR BLD AUTO: 26.4 %
MCH RBC QN AUTO: 31.7 PG (ref 26–34)
MCHC RBC AUTO-ENTMCNC: 33.9 G/DL (ref 32–36)
MCV RBC AUTO: 94 FL (ref 80–100)
MONOCYTES # BLD AUTO: 0.58 X10*3/UL (ref 0.05–0.8)
MONOCYTES NFR BLD AUTO: 9.3 %
NEUTROPHILS # BLD AUTO: 3.79 X10*3/UL (ref 1.6–5.5)
NEUTROPHILS NFR BLD AUTO: 60.9 %
NON-HDL CHOLESTEROL: 155 MG/DL (ref 0–149)
NRBC BLD-RTO: 0 /100 WBCS (ref 0–0)
PLATELET # BLD AUTO: 250 X10*3/UL (ref 150–450)
POTASSIUM SERPL-SCNC: 4.4 MMOL/L (ref 3.5–5.3)
PROT SERPL-MCNC: 7 G/DL (ref 6.4–8.2)
PSA SERPL-MCNC: 1.88 NG/ML
RBC # BLD AUTO: 4.64 X10*6/UL (ref 4.5–5.9)
SODIUM SERPL-SCNC: 142 MMOL/L (ref 136–145)
WBC # BLD AUTO: 6.2 X10*3/UL (ref 4.4–11.3)

## 2025-01-09 PROCEDURE — 3078F DIAST BP <80 MM HG: CPT | Performed by: INTERNAL MEDICINE

## 2025-01-09 PROCEDURE — 1159F MED LIST DOCD IN RCRD: CPT | Performed by: INTERNAL MEDICINE

## 2025-01-09 PROCEDURE — 1036F TOBACCO NON-USER: CPT | Performed by: INTERNAL MEDICINE

## 2025-01-09 PROCEDURE — 80053 COMPREHEN METABOLIC PANEL: CPT

## 2025-01-09 PROCEDURE — 83718 ASSAY OF LIPOPROTEIN: CPT

## 2025-01-09 PROCEDURE — G0439 PPPS, SUBSEQ VISIT: HCPCS | Performed by: INTERNAL MEDICINE

## 2025-01-09 PROCEDURE — 85025 COMPLETE CBC W/AUTO DIFF WBC: CPT

## 2025-01-09 PROCEDURE — 99213 OFFICE O/P EST LOW 20 MIN: CPT | Performed by: INTERNAL MEDICINE

## 2025-01-09 PROCEDURE — 3074F SYST BP LT 130 MM HG: CPT | Performed by: INTERNAL MEDICINE

## 2025-01-09 PROCEDURE — 82465 ASSAY BLD/SERUM CHOLESTEROL: CPT

## 2025-01-09 PROCEDURE — 1160F RVW MEDS BY RX/DR IN RCRD: CPT | Performed by: INTERNAL MEDICINE

## 2025-01-09 PROCEDURE — 1170F FXNL STATUS ASSESSED: CPT | Performed by: INTERNAL MEDICINE

## 2025-01-09 PROCEDURE — G0103 PSA SCREENING: HCPCS

## 2025-01-09 PROCEDURE — 1126F AMNT PAIN NOTED NONE PRSNT: CPT | Performed by: INTERNAL MEDICINE

## 2025-01-09 RX ORDER — ATORVASTATIN CALCIUM 20 MG/1
20 TABLET, FILM COATED ORAL DAILY
Qty: 90 TABLET | Refills: 3 | Status: SHIPPED | OUTPATIENT
Start: 2025-01-09 | End: 2026-01-09

## 2025-01-09 ASSESSMENT — ACTIVITIES OF DAILY LIVING (ADL)
BATHING: INDEPENDENT
GROCERY_SHOPPING: NEEDS ASSISTANCE
DRESSING: INDEPENDENT
MANAGING_FINANCES: NEEDS ASSISTANCE
DOING_HOUSEWORK: NEEDS ASSISTANCE
TAKING_MEDICATION: INDEPENDENT

## 2025-01-09 ASSESSMENT — ENCOUNTER SYMPTOMS
FEVER: 0
LOSS OF SENSATION IN FEET: 0
DEPRESSION: 1
POLYDIPSIA: 0
COUGH: 0
CHILLS: 0
SHORTNESS OF BREATH: 0
OCCASIONAL FEELINGS OF UNSTEADINESS: 1
PALPITATIONS: 0

## 2025-01-09 ASSESSMENT — PATIENT HEALTH QUESTIONNAIRE - PHQ9
2. FEELING DOWN, DEPRESSED OR HOPELESS: SEVERAL DAYS
SUM OF ALL RESPONSES TO PHQ9 QUESTIONS 1 AND 2: 2
1. LITTLE INTEREST OR PLEASURE IN DOING THINGS: SEVERAL DAYS

## 2025-01-09 ASSESSMENT — PAIN SCALES - GENERAL: PAINLEVEL_OUTOF10: 0-NO PAIN

## 2025-01-09 NOTE — PROGRESS NOTES
Subjective   Reason for Visit: Thomas Frankel is an 74 y.o. male here for a Medicare Wellness visit.     Past Medical, Surgical, and Family History reviewed and updated in chart.    Reviewed all medications by prescribing practitioner or clinical pharmacist (such as prescriptions, OTCs, herbal therapies and supplements) and documented in the medical record.    Patient presents today for an annual wellness exam    Medical history and surgical history updated today  Medication list reconciled and updated  Patient denies vision issues at this time  Patient denies hearing issues at this time  Follows with a dentist: Yes    Patient counseled about available preventative health vaccinations and provided with opportunity to update them with our office or through prescription to preferred pharmacy.    Reviewed and discussed preventative health screening recommendations for colon cancer: due and ordered. counseled    Reviewed and discussed preventative health recommendations for screening for prostate cancer: ordered.     Cough, unsure if it is related to meals specifically.  History of Parkinson's disease and a chronic cough makes me suspicious for possible aspiration of food beverages or saliva.  If recommend evaluation of cough by swallow study to assure no cough related to aspiration, if aspiration related cough then speech therapy will be ordered.  No other respiratory symptoms such as shortness of breath wheezing mucus production.  No upper respiratory symptoms such as sinus congestion pressure or rhinitis.          Patient Care Team:  Sumit Toscano MD as PCP - General (Internal Medicine)     Review of Systems   Constitutional:  Negative for chills and fever.   Respiratory:  Negative for cough and shortness of breath.    Cardiovascular:  Negative for chest pain and palpitations.   Endocrine: Negative for polydipsia and polyuria.       Objective   Vitals:  /56   Pulse (!) 112   Temp 36 °C (96.8 °F) (Temporal)    Ht 1.829 m (6')   Wt 95.7 kg (211 lb)   BMI 28.62 kg/m²       Physical Exam  Constitutional:       Appearance: Normal appearance.   HENT:      Head: Normocephalic and atraumatic.      Right Ear: Tympanic membrane normal.      Left Ear: Tympanic membrane normal.      Nose: Nose normal.      Mouth/Throat:      Mouth: Mucous membranes are moist.   Eyes:      Extraocular Movements: Extraocular movements intact.      Conjunctiva/sclera: Conjunctivae normal.      Pupils: Pupils are equal, round, and reactive to light.   Neck:      Thyroid: No thyroid mass, thyromegaly or thyroid tenderness.      Vascular: No carotid bruit.   Cardiovascular:      Rate and Rhythm: Normal rate and regular rhythm.      Heart sounds: No murmur heard.     No friction rub. No gallop.   Pulmonary:      Effort: Pulmonary effort is normal. No respiratory distress.      Breath sounds: No wheezing, rhonchi or rales.   Abdominal:      General: Bowel sounds are normal.      Palpations: Abdomen is soft.      Tenderness: There is no abdominal tenderness. There is no guarding.      Hernia: No hernia is present.   Musculoskeletal:      Cervical back: Neck supple. No tenderness.      Right lower leg: No edema.      Left lower leg: No edema.   Lymphadenopathy:      Cervical: No cervical adenopathy.   Skin:     Coloration: Skin is not jaundiced.   Neurological:      General: No focal deficit present.      Mental Status: He is alert and oriented to person, place, and time.   Psychiatric:         Mood and Affect: Mood normal.         Assessment & Plan  Routine general medical examination at health care facility    Orders:    1 Year Follow Up In [facility] - PCP - Wellness Exam; Future    Lipid Panel Non-Fasting; Future    Comprehensive Metabolic Panel; Future    CBC and Auto Differential; Future    Prostate Spec.Ag,Screen; Future    Parkinson's disease without dyskinesia or fluctuating manifestations    Orders:    FL modified barium swallow  study; Future    Recurrent falls  Resume [employer]         Mixed hyperlipidemia    Orders:    atorvastatin (Lipitor) 20 mg tablet; Take 1 tablet (20 mg) by mouth once daily.    Lipid Panel Non-Fasting; Future    Comprehensive Metabolic Panel; Future    CBC and Auto Differential; Future    Prostate Spec.Ag,Screen; Future    Abnormal liver enzymes    Orders:    atorvastatin (Lipitor) 20 mg tablet; Take 1 tablet (20 mg) by mouth once daily.    Lipid Panel Non-Fasting; Future    Comprehensive Metabolic Panel; Future    CBC and Auto Differential; Future    Prostate Spec.Ag,Screen; Future    Chronic anemia    Orders:    atorvastatin (Lipitor) 20 mg tablet; Take 1 tablet (20 mg) by mouth once daily.    Lipid Panel Non-Fasting; Future    Comprehensive Metabolic Panel; Future    CBC and Auto Differential; Future    Prostate Spec.Ag,Screen; Future    Screening for prostate cancer    Orders:    Lipid Panel Non-Fasting; Future    Comprehensive Metabolic Panel; Future    CBC and Auto Differential; Future    Prostate Spec.Ag,Screen; Future    Encounter for screening for malignant neoplasm of colon    Orders:    Colonoscopy Screening; Average Risk Patient; Future    Chronic cough    Orders:    FL modified barium swallow study; Future

## 2025-01-09 NOTE — ASSESSMENT & PLAN NOTE
Orders:    atorvastatin (Lipitor) 20 mg tablet; Take 1 tablet (20 mg) by mouth once daily.    Lipid Panel Non-Fasting; Future    Comprehensive Metabolic Panel; Future    CBC and Auto Differential; Future    Prostate Spec.Ag,Screen; Future

## 2025-01-10 ENCOUNTER — TELEPHONE (OUTPATIENT)
Dept: NEUROLOGY | Facility: CLINIC | Age: 75
End: 2025-01-10
Payer: MEDICARE

## 2025-01-10 DIAGNOSIS — G20.A2 PARKINSON'S DISEASE WITHOUT DYSKINESIA, WITH FLUCTUATING MANIFESTATIONS: ICD-10-CM

## 2025-01-10 RX ORDER — CARBIDOPA, LEVODOPA AND ENTACAPONE 37.5; 200; 15 MG/1; MG/1; MG/1
TABLET, FILM COATED ORAL
Qty: 150 TABLET | Refills: 11 | Status: SHIPPED | OUTPATIENT
Start: 2025-01-10

## 2025-01-10 NOTE — TELEPHONE ENCOUNTER
Thomas Frankel wife Germaine called. Jose Elias needs a refill of Stalevo 1 cap 5xdaily   Please send #[phone] with 11 refills to [facility] in [place] on file! He is out of medication today

## 2025-01-22 ENCOUNTER — APPOINTMENT (OUTPATIENT)
Dept: NEUROLOGY | Facility: CLINIC | Age: 75
End: 2025-01-22
Payer: MEDICARE

## 2025-01-27 ENCOUNTER — APPOINTMENT (OUTPATIENT)
Dept: RADIOLOGY | Facility: HOSPITAL | Age: 75
End: 2025-01-27
Payer: MEDICARE

## 2025-02-12 ENCOUNTER — TELEPHONE (OUTPATIENT)
Dept: NEUROLOGY | Facility: CLINIC | Age: 75
End: 2025-02-12
Payer: MEDICARE

## 2025-02-12 NOTE — TELEPHONE ENCOUNTER
Spouse calls with multiple questions about meds from other providers. She is not sure if he has been taking oxybutinin or fludricortisone. Says med was refilled in past by [facility]. I reminded her she has an appt next week with JERALD Chamorro and they can go over all meds with her then to renew some or refer to PCP. She will bring in pill bottles. Some Rxs sound older than one year.

## 2025-02-14 ENCOUNTER — HOSPITAL ENCOUNTER (OUTPATIENT)
Dept: RADIOLOGY | Facility: HOSPITAL | Age: 75
Discharge: HOME | End: 2025-02-14
Payer: MEDICARE

## 2025-02-14 DIAGNOSIS — G20.A1 PARKINSON'S DISEASE WITHOUT DYSKINESIA OR FLUCTUATING MANIFESTATIONS: ICD-10-CM

## 2025-02-14 DIAGNOSIS — R05.3 CHRONIC COUGH: ICD-10-CM

## 2025-02-14 PROCEDURE — 2500000005 HC RX 250 GENERAL PHARMACY W/O HCPCS: Performed by: INTERNAL MEDICINE

## 2025-02-14 PROCEDURE — 92611 MOTION FLUOROSCOPY/SWALLOW: CPT | Mod: GN

## 2025-02-14 PROCEDURE — 74230 X-RAY XM SWLNG FUNCJ C+: CPT

## 2025-02-14 RX ADMIN — BARIUM SULFATE 60 ML: 0.81 POWDER, FOR SUSPENSION ORAL at 11:55

## 2025-02-14 RX ADMIN — BARIUM SULFATE 5 ML: 400 PASTE ORAL at 11:56

## 2025-02-14 RX ADMIN — BARIUM SULFATE 40 ML: 400 SUSPENSION ORAL at 11:55

## 2025-02-14 RX ADMIN — BARIUM SULFATE 5 ML: 400 SUSPENSION ORAL at 11:55

## 2025-02-14 RX ADMIN — BARIUM SULFATE 700 MG: 700 TABLET ORAL at 11:56

## 2025-02-14 NOTE — PROGRESS NOTES
Speech-Language Pathology  Outpatient Modified Barium Swallow Study    Patient Name: Thomas Frankel  MRN: [id number]  : 1950  Today's Date: 25  Time Calculation  Start Time: 1123  Stop Time: 1140  Time Calculation (min): 17 min        Modified Barium Swallow Study completed. Informed verbal consent obtained prior to completion of exam. Trials of  thin liquids, mildly/moderately thick liquids, purees, solids, and barium tablet with thin liquids were administered for exam this date.  AP view not obtained due to patient safety/mobility concerns.    SLP: Mia Ely SLP   Contact info: [employer] secure chat    Reason for Referral: C/f aspiration/oropharyngeal dysphagia  Patient Hx: PD with DBS placement in , cognitive decline, LBP. ? Sleep apnea  Respiratory Status: WFL  Current diet: no restrictions    Pain:  Patient exhibits no s/s of pain or discomfort during exam.      RECOMMENDATIONS:   - Easy to Chew (IDDSI Level 7)  - Thin liquids (IDDSI Level 0)  - SLP FOR DYSPHAGIA MANAGEMENT  *AVOID MIXED CONSISTENCIES IF NEEDED    STRATEGIES:  Upright for all PO intake  Small bites/sips  Slow rate of consumption    SLP PLAN:  Skilled SLP Services: Skilled SLP intervention for dysphagia is warranted.  SLP Frequency: To be determined by treating SLP in outpatient setting  Duration: TBD  Treatment/Interventions:   - Bolus trials  - Compensatory strategy training  - Diet tolerance/advancement  - Patient/caregiver education    Discussed POC: patient  Discussed Risks/Benefits: Yes  Patient/Caregiver Agreeable: Yes    Short term goals established:  TBD by primary SLP    Education Provided:  Results and recommendations of MBSS reviewed with patient upon completion of exam. POC discussed at this time with  need for compensatory swallowing strategies to maximize swallowing safety and efficiency.  Unable to determine patient comprehension of information presented.  Provided written handout of strategies and diet  recommendations for reference.     Treatment Provided Today:  N/A    Additional Medical Consults Suggested:   N/A    Repeat Study: as needed    Mechanics of the Swallow Summary:  ORAL PHASE:  Lip Closure - Intact  Tongue Control During Bolus Hold - Intact  Bolus prep/mastication - Impaired  Bolus transport/lingual motion - Impaired  Oral residue - present    PHARYNGEAL PHASE:  Initiation of pharyngeal swallow - intact  Soft palate elevation - Intact  Laryngeal elevation - Intact  Anterior hyoid excursion - Intact  Epiglottic movement - Intact  Laryngeal vestibule closure - Impaired  Pharyngeal stripping wave - Impaired  Pharyngeal contraction (A/P view) - Not tested  Pharyngoesophageal segment opening - Intact  Tongue base retraction - Impaired  Pharyngeal residue - present    ESOPHAGEAL PHASE:  Esophageal clearance - Intact    SLP Impressions with Severity Rating:     Pt presents with oropharyngeal dysphagia upon completion of modified barium swallow study this date. Swallowing physiology is characterized by the above noted deficits. Premature pharyngeal entry viewed with most liquid boluses to level of piriforms. Oral residue noted with large thin liquid boluses, which filled pharynx post swallows. Laryngeal penetration and trace, silent aspiration occurred with large thin liquid boluses. Laryngeal penetration and silent aspiration continued from oral residue as reswallow completed. Vallecular and piriform residue noted with all liquid boluses, which did not appear to accumulate and cleared partially with subsequent swallows. No further laryngeal penetration was observed with small thin liquid boluses, thin liquids with barium tablet,  or with any other consistency tested. Discussed potential need for thickened liquids if post prandial cough or pulmonary changes develop. Patient will benefit from ongoing therapy. Esophageal column clear post swallow.    *Of note: The  oblique bolus follow-through is not intended  to be utilized as a diagnostic assessment of the esophagus, but rather as a tool to observe the biomechanical aspects of the swallow continuum to inform the need for further evaluation by medical specialists, as applicable.     OUTCOME MEASURES:  Functional Oral Intake Scale  Functional Oral Intake Scale: Level 6        total oral diet with multiple consistencies without special preparations but specific food limitations    EAT-10  0=No problem, 1=Mild problem, 2=Mild to moderate problem, 3=Moderate problem, 4=Severe problem    EAT 10  My swallowing problem has caused me to lose weight.: 0  My swallowing problem interferes with my ability to go out for meals.: 1  Swallowing liquids takes extra effort.: 1  Swallowing solids takes extra effort.: 1  Swallowing pills takes extra effort.: 0  Swallowing is painful: 0  The pleasure of eating is affected by my swallowing.: 0  When I swallow food sticks in my throat.: 0  I cough when I eat.: 1  Swallowing is stressful: 0  EAT-10 TOTAL SCORE:: 4    A total score of 3 or above may indicate difficulty with swallowing safely and/or efficiently    Rosenbek's Penetration Aspiration Scale    Thin Liquids: 8. SILENT ASPIRATION - contrast passes glottis, visible residue, NO pt response]     Nectar Thick Liquids: 1. NO ASPIRATION & NO PENETRATION - no aspiration, contrast does not enter airway    Honey Thick Liquids: 1. NO ASPIRATION & NO PENETRATION - no aspiration, contrast does not enter airway    Puree: 1. NO ASPIRATION & NO PENETRATION - no aspiration, contrast does not enter airway    Solids: 1. NO ASPIRATION & NO PENETRATION - no aspiration, contrast does not enter airway

## 2025-02-17 DIAGNOSIS — G20.B1 PARKINSON'S DISEASE WITH DYSKINESIA WITHOUT FLUCTUATING MANIFESTATIONS: ICD-10-CM

## 2025-02-17 DIAGNOSIS — R05.3 CHRONIC COUGH: ICD-10-CM

## 2025-02-17 DIAGNOSIS — G20.A1 PARKINSON'S DISEASE WITHOUT DYSKINESIA OR FLUCTUATING MANIFESTATIONS: Primary | ICD-10-CM

## 2025-02-19 ENCOUNTER — APPOINTMENT (OUTPATIENT)
Dept: NEUROLOGY | Facility: CLINIC | Age: 75
End: 2025-02-19
Payer: MEDICARE

## 2025-02-19 VITALS
SYSTOLIC BLOOD PRESSURE: 139 MMHG | RESPIRATION RATE: 18 BRPM | WEIGHT: 210 LBS | HEART RATE: 114 BPM | BODY MASS INDEX: 28.48 KG/M2 | DIASTOLIC BLOOD PRESSURE: 82 MMHG

## 2025-02-19 DIAGNOSIS — G20.A2 PARKINSON'S DISEASE WITHOUT DYSKINESIA, WITH FLUCTUATING MANIFESTATIONS: Primary | ICD-10-CM

## 2025-02-19 DIAGNOSIS — R13.10 DYSPHAGIA, UNSPECIFIED TYPE: ICD-10-CM

## 2025-02-19 DIAGNOSIS — F41.9 ANXIETY DISORDER, UNSPECIFIED TYPE: ICD-10-CM

## 2025-02-19 DIAGNOSIS — G47.52 RBD (REM BEHAVIORAL DISORDER): ICD-10-CM

## 2025-02-19 DIAGNOSIS — R41.89 COGNITIVE DECLINE: ICD-10-CM

## 2025-02-19 PROCEDURE — 95970 ALYS NPGT W/O PRGRMG: CPT | Performed by: NURSE PRACTITIONER

## 2025-02-19 PROCEDURE — 99215 OFFICE O/P EST HI 40 MIN: CPT | Performed by: NURSE PRACTITIONER

## 2025-02-19 RX ORDER — FLUDROCORTISONE ACETATE 0.1 MG/1
0.1 TABLET ORAL
Qty: 90 TABLET | Refills: 3 | Status: SHIPPED | OUTPATIENT
Start: 2025-02-19

## 2025-02-19 ASSESSMENT — UNIFIED PARKINSONS DISEASE RATING SCALE (UPDRS)
RIGIDITY_NECK: 2
CHAIR_RISING_SCALE: 0
LEVODOPA: YES
TOETAPPING_RIGHT: 2
KINETIC_TREMOR_LEFTHAND: 1
KINETIC_TREMOR_RIGHTHAND: 0
POSTURAL_TREMOR_LEFTHAND: 0
GAIT: 2
SPEECH: 2
POSTURAL_STABILITY: 0
MINUTES_SINCE_LEVODOPA: 60MINS
TOTAL_SCORE: 35
AMPLITUDE_LUE: 0
AMPLITUDE_RLE: 0
CONSTANCY_TREMOR_ATREST: 0
POSTURE: 2
RIGIDITY_LUE: 2
FACIAL_EXPRESSION: 2
AMPLITUDE_RUE: 1
RIGIDITY_LLE: 2
LEG_AGILITY_LEFT: 1
FREEZING_GAIT: 0
FINGER_TAPPING_RIGHT: 1
PARKINSONS_MEDS: NO
POSTURAL_TREMOR_RIGHTHAND: 0
PRONATION_SUPINATION_LEFT: 2
AMPLITUDE_LLE: 0
TOETAPPING_LEFT: 1
SPONTANEITY_OF_MOVEMENT: 2
FINGER_TAPPING_LEFT: 1
RIGIDITY_RLE: 2
HANDMOVEMENTS_RIGHT: 2
LEG_AGILITY_RIGHT: 1
AMPLITUDE_LIP_JAW: 0
PRONATION_SUPINATION_RIGHT: 1
RIGIDITY_RUE: 2

## 2025-02-19 ASSESSMENT — PATIENT HEALTH QUESTIONNAIRE - PHQ9
2. FEELING DOWN, DEPRESSED OR HOPELESS: NOT AT ALL
SUM OF ALL RESPONSES TO PHQ9 QUESTIONS 1 AND 2: 0
1. LITTLE INTEREST OR PLEASURE IN DOING THINGS: NOT AT ALL

## 2025-02-19 ASSESSMENT — ENCOUNTER SYMPTOMS
OCCASIONAL FEELINGS OF UNSTEADINESS: 0
DEPRESSION: 0
LOSS OF SENSATION IN FEET: 0

## 2025-02-19 NOTE — PROGRESS NOTES
Subjective     Thomas Frankel is a 74 y.o. year old male who presents with No chief complaint on file., here for follow up visit.    HPI  Last visit w/me 7/17/25:  #Monitor deep brain stimulator battery at least once a month as good habit. You will get a warning when opening your darcie when you are due for replacement and to contact our office asap when that occrs.  [employer] Patient Support  [phone]  #Consult pain management for chronic low back pain  #Try [employer] classes for voice  Visit [email or web address] for tip and recommendations of voice.   Discussed speech therapy--let me know if you would like an order  #Reccommended sleep study for sleep apnea/snoring, please let me know if you would like an order  #Blood Pressure Monitoring: for dizziness and or low blood pressure  Doing this to monitor while on fludrocortisone  Sitting and standing blood pressure: Please take sitting blood pressure and heart rate and after standing up for 3 minutes while you are still standing take blood pressure and heart rate again  Lying blood pressure: as well as at bedtime after lying flat for 5 mins  Do this twice a day (mid morning and mid afternoon) for 1 week   Take the lying bedtime readings at bedtime  then send me the readings via [employer] or fax ([phone])  #Continue meds unchanged  Med Dose   Stalevo 150mg  1 tab 6 x/day  (prescribed 5x/day)   Donepezil 10mg 1 tab in the am   Fludrocortisone 0.1mg 1 tab daily     #For constipation increasing non-caffeine fluids and moving more may help.  I understand you have urinary issues impeding this, please discuss with urology.   #Discuss if you are a candidate for Botox with urologist since having urinary incontinence and nocturia (urinating at night)  #Cancel apt with Dr. Sutton and see me in 6M on a day he is also here (Dr. Sutton came in to see patient briefly today)      He comes with wife.    She brings all of his meds to review.   Has not taken BPs as asked  for for monitoring on Florinef and needs refill.     Wife was filling meds, thought he could do it and then realized he was doing it wrong.   [facility] were previously seen [facility]   Oxcarbazepine for seizures but denies ever having them,hx heavy ETOH use, no longer but denies withdrawal seizures.   Wife wants him to stop it and stopped it for at least 6M--stopped cold turkey--wants to know if should continue .    On oxybutyninDr. Yuliana gave him. Also on Myrbetriq.       MOTOR SYMPTOMS      +/-                            Comments  Motor sx overall  Stable   Tremor + W/ wearing off meds   Rigidity -    Bradykinesia -    Balance/gait + Cane all the time  Careful not to fall--goes up and down stairs  L ankle frx makes L foot turn out (he had a plate/surgery)   FOG -    Falls +    PT - Last 1 yr ago   Exercise + Better Every Day at [employer] sporadically, once a week-----restarting after not for a few days     NON MOTOR SYMPTOMS       +/-                               Comments  Orthostatic lightheadedness  - On Florinef though  Denies having issues with hypotension   Cognitive + MCI on NP testing in 2021 but declines to repeat    On donepezil,   Insomnia + Takes meds when he gets  Nocturia 3-4x/night  Sleeps a lot during the day and goes to be early   RBD + Most nights   Depression + PhQ2- 0    Declines to see psychiatry or psychology   Anxiety +    Fatigue + Naps a lot during the day--4hrs/day   Sialorrhea + Mostly sleeping   Hypophonia + Worse   Dysphagia + MBSS recently--recs provided  2/14/25  RECOMMENDATIONS:  - Easy to Chew (IDDSI Level 7)  - Thin liquids (IDDSI Level 0)  - SLP FOR DYSPHAGIA MANAGEMENT  *AVOID MIXED CONSISTENCIES IF NEEDED   Constipation + Better q2days,   Mg citrate or enema PRN  Miralax and Senna   Urinary dysfunction + Urinary IC, nocturia 3-4x/ night  Enlarged testicle and monitoring  Myrbetriq        Social  Lives with: wife  Help with ADLs: wife drives          [facility]  Meds  Med Dose Time   Stalevo 150mg  1 tab 6 x/day  (prescribed 5x/day) ([id number])    Taking middle of the night dose 2am- states he has been doing it for so long he is unsure if he neds it--body wakes him up    Bedtime is 8-9pm    Donepezil 10mg 1 tab in the am    Fludrocortisone 0.1mg 1 tab daily    Latency 1hr    Wearing off None    Side effects     Dyskinesia None    Hallucinations None    Other None        Prior workup  Chronic LBP, hx of multiple surgeries, previously followed by pain management, last visit without weakness on exam, denies paresthesias. Declines to see [facility] management.    CT head 7/26/23:   Impression   1. Stable appearance of bilateral deep brain stimulator leads  atelectasis.  2. The DBS generator is not visualized, but no inflammatory changes  noted along the visualized DBS leads.  3. No acute intracranial hemorrhage, mass effect, or CT apparent  acute infarct. No abnormal enhancement       Patient Health Questionnaire-2 Score: 0            Current Outpatient Medications:     acetaminophen (Tylenol) 325 mg tablet, Take by mouth every 4 hours., Disp: , Rfl:     atorvastatin (Lipitor) 20 mg tablet, Take 1 tablet (20 mg) by mouth once daily., Disp: 90 tablet, Rfl: 3    calcium carbonate-vitamin D3 600 mg-10 mcg (400 unit) tablet, Take by mouth twice a day., Disp: , Rfl:     carbidopa-levodopa-entacapone (Stalevo) 37.5-150-200 mg tablet, Take one tablet 5 times a day, Disp: 150 tablet, Rfl: 11    cholecalciferol (Vitamin D-3) 50 MCG (2000 UT) tablet, Take 1 tablet (50 mcg) by mouth once daily., Disp: , Rfl:     cyanocobalamin (Vitamin B-12) 1,000 mcg tablet, Take 1 tablet (1,000 mcg) by mouth once daily., Disp: , Rfl:     fludrocortisone (Florinef) 0.1 mg tablet, Take 1 tablet (0.1 mg) by mouth once daily in the morning. Take before meals., Disp: 90 tablet, Rfl: 3    Myrbetriq 25 mg tablet extended release 24 hr 24 hr tablet, Take 2 tablets (50 mg) by mouth once daily., Disp:  , Rfl:     polyethylene glycol (Glycolax) 17 gram/dose powder, Take 17 g by mouth., Disp: , Rfl:        Objective   Vitals:    25 1447 25 1448   BP: 152/89 139/82   BP Location: Left arm Left arm   Patient Position: Sitting Standing   BP Cuff Size: Large adult Large adult   Pulse: 106 (!) 114   Resp: 18    Weight: 95.3 kg (210 lb)                      Physical Exam      Eye ROM is wnl, no cross eyes noted, accomodation is normal (wife thought he had a lazy eye)      MDS UPDRS 1st Score: Motor Examination  Is the patient on medication for treating the symptoms of Parkinson's Disease?: No  Is the patient on Levodopa?: Yes  Minutes since last Levodopa dose: 60mins  Speech: 2  Facial Expression: 2  Rigidty Neck: 2  Rigidty RUE: 2  Rigidity - LUE: 2  Rigidity RLE: 2  Rigidity LLE: 2  Finger Tapping Right Hand: 1  Finger Tapping Left Hand: 1  Hand Movements- Right Hand: 2  Hand Movements- Left Hand: 1  Pronatiaon-Supination Movments - Right Hand: 1  Pronatiaon-Supination Movments Left Hand: 2  Toe Tapping Right Foot: 2  Toe Tapping - Left Foot: 1  Leg Agility - Right Le  Leg Agility - Left le  Arising from Chair: 0  Gait: 2  Freezing of Gait: 0  Postural Stability: 0 (not tested)  Posture: 2  Global Spontanteity of Movment ( Body Bradykinesia): 2  Postural Tremor - Right Hand: 0  Postural Tremor - Left hand: 0  Kinetic Tremor - Right hand: 0  Kinetic Tremor - Left hand: 1  Rest Tremor Amplitude - RUE: 1  Rest Tremor Amplitude - LUE: 0  Rest Tremor Amplitude - RLE: 0  Rest Tremor Amplitude - LLE: 0  Rest Tremor Amplitude - Lip/Jaw: 0  Constancy of Rest Tremor: 0  MDS UPDRS Total Score: 35        DBS procedure  Metronic Activa SC 2023   L chest IPG/L STN- 2.55v---MONISHA  Impedances all OK-560/6.2 mA    L STN  C+2-3- 3.6/90/185 ((2.8-4.6))      [employer] Activa SC  R chest IPG/ R STN- 2.46v ---MONISHA  Impedances 615/6.3mA--all OK    R STN  2+3- 3.95/90/185  ((2.95-4.95))    No changes to stimulation  above.      Assessment/Plan   Mr. Thomas Frankel is a 74 y.o. M with PD (dx 1994, s/p bl STN [employer] DBS in 2006), cognitive decline (one donepezil). Patient looks overall motorically good 30 years into PD on Stalevo, tolerating without SE or wearing off though he does dose at 2am--tried stopping but could not tolerate. Could consider CR Sinemet at hs but bedtime is Bedtime is 8-9pm so would replace bedtime Stalevo dose first and see if helpful if unable to get rid of 2am dose (see below). He and wife prefer to keep regimen unchanged, can consider Rytary in the future if needed.     IPGs are both MONISHA and replaced q18M per wife and concern the pocket was getting too thin since he has had DBS x 15 yrs. 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 Consider rechargable. Discussed monitoring battery life once he gets new IPGs.    OH: He is on fludrocortisone, no sx but since previous neuro prescribed we will be taking over and should monitor OH BPs to get a baseline---did not do this last time, importanve of this discussed.     RBD-rec melatonin\    Dysphagia: rec ENT and LSVT PT, agreeable.     Daytime fatigue but nocturia 3-4x/night, wakes and takes 2am Stalevo as above. Encouraged urology f/u for nocturia interfering with sleep as well (and urinary IC, meds not helping). Sleeping more and snoring + wife heard him coughing/choking in his sleep and so recommended sleep study to r/o sleep apnea- he declines, risks of FARA discussed. RBD could contribute. Yrs ago had an apnea at home test reported as negative but not that tired then. Rec to repeat still and declines.    Cognitive decline: worsening, declines to do any memory testing, can check B12 and TSH since has been a while.   Pt self d/c oxcarbazepine, denies hx of seizures, has been off x 6M and no changes. OK to keep off per Dr. Sutton.       Diagnoses and all orders for this visit:  Parkinson's disease without dyskinesia, with fluctuating  manifestations  -     fludrocortisone (Florinef) 0.1 mg tablet; Take 1 tablet (0.1 mg) by mouth once daily in the morning. Take before meals.  -     Referral to Physical Therapy; Future  -     Referral to ENT; Future  -     Referral to Speech Therapy; Future  Dysphagia, unspecified type  -     Referral to ENT; Future  -     Referral to Speech Therapy; Future  Cognitive decline  -     Vitamin B12; Future  -     TSH with reflex to Free T4 if abnormal; Future  Anxiety disorder, unspecified type  -     TSH with reflex to Free T4 if abnormal; Future  RBD (REM behavioral disorder)      #Both DBS batteries are due to be replaced, message sent to Dr. Davila    #Labs ordered for cognitive decline    TSH  B12    #Stop oxybutynin since on Myrbetriq    Stay off of oxcarbazepine since you stopped x 6M per Dr. Sutton    #LSVT Parkinson's physical and speech therapy ordered    ENT consult needs completed prior to speech therapy    #Blood Pressure Monitoring: for dizziness and or low blood pressure    Doing this to monitor while on fludrocortisone    Sitting and standing blood pressure: Please take sitting blood pressure and heart rate and after standing up for 3 minutes while you are still standing take blood pressure and heart rate again  Lying blood pressure: as well as at bedtime after lying flat for 5 mins  Do this twice a day (mid morning and mid afternoon) for 1 week   Take the lying bedtime readings at bedtime  then send me the readings via [employer] or fax ([phone])    #Start melatonin 5mg at bedtime. In 2 weeks, if you are still having speaking/moving/dream enactment while sleeping, you can increase to 10mg at night. If tolerated, try taking consistently for several weeks to see if benefit--if not, then OK to stop taking.     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        #Monitor deep brain stimulator battery at least once a month as good habit. You will get a warning when opening  your darcie when you are due for replacement and to contact our office asap when that occrs.    [employer] Patient Support  [phone]    #Reccommended sleep study for sleep apnea/snoring, please let me know if you would like an order    #Continue meds unchanged      Med Dose   Stalevo 150mg  1 tab 6 x/day  (prescribed 5x/day)   Donepezil 10mg 1 tab in the am   Fludrocortisone 0.1mg 1 tab daily       #For constipation increasing non-caffeine fluids and moving more may help.     I understand you have urinary issues impeding this, please discuss with urology.     #Discuss if you are a candidate for Botox with urologist since having urinary incontinence and nocturia (urinating at night)    #See me in 2M for 1hr DBS       I, JERALD Chamorro, personally performed  a medically appropriate exam, discussion of care/treatment options taking a total time of 43 minutes for today's visit.  No changes to DBS programming as noted above-checking settings, stimulator events, energy level, and impedances and updating settings in the chart.         Kam Chamorro, JERALD-C  Adult/Gerontological Nurse Practitioner   [facility], Department of Neurology  [facility] [place]  [facility]  [id number] [place] [address] [place] [id number]  Phone: [phone]  Fax: [phone]

## 2025-02-19 NOTE — PATIENT INSTRUCTIONS
#Both DBS batteries are due to be replaced, message sent to Dr. Davila    #Labs ordered for cognitive decline    TSH  B12    #Stop oxybutynin since on Myrbetriq    Stay off of oxcarbazepine since you stopped x 6M per Dr. Sutton    #LSVT Parkinson's physical and speech therapy ordered    ENT consult needs completed prior to speech therapy    #Blood Pressure Monitoring: for dizziness and or low blood pressure    Doing this to monitor while on fludrocortisone    Sitting and standing blood pressure: Please take sitting blood pressure and heart rate and after standing up for 3 minutes while you are still standing take blood pressure and heart rate again  Lying blood pressure: as well as at bedtime after lying flat for 5 mins  Do this twice a day (mid morning and mid afternoon) for 1 week   Take the lying bedtime readings at bedtime  then send me the readings via [employer] or fax ([phone])    #Start melatonin 5mg at bedtime. In 2 weeks, if you are still having speaking/moving/dream enactment while sleeping, you can increase to 10mg at night. If tolerated, try taking consistently for several weeks to see if benefit--if not, then OK to stop taking.     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        #Monitor deep brain stimulator battery at least once a month as good habit. You will get a warning when opening your darcie when you are due for replacement and to contact our office asap when that occrs.    [employer] Patient Support  [phone]    #Reccommended sleep study for sleep apnea/snoring, please let me know if you would like an order    #Continue meds unchanged      Med Dose   Stalevo 150mg  1 tab 6 x/day  (prescribed 5x/day)   Donepezil 10mg 1 tab in the am   Fludrocortisone 0.1mg 1 tab daily       #For constipation increasing non-caffeine fluids and moving more may help.     I understand you have urinary issues impeding this, please discuss with urology.     #Discuss if you  are a candidate for Botox with urologist since having urinary incontinence and nocturia (urinating at night)    #See me in 2M for 1hr DBS      Kam Chamorro, NP-C  Adult/Gerontological Nurse Practitioner   [facility], Department of Neurology  [facility] [place]  [facility]  [address]  [place] [id number]  Phone: [phone]  Fax: [phone]

## 2025-02-20 NOTE — PROGRESS NOTES
[facility]   Neurosurgery    Diagnosis  Kieran was seen today for dbs generator replacement.  Diagnoses and all orders for this visit:  Parkinson's disease with dyskinesia and fluctuating manifestations  Status post deep brain stimulator placement  End of battery life of deep brain stimulator      Patient Discussion/Summary  1) Today we discussed the need for replacement of your DBS generators on both sides of your chest, at both are at risk for declining soon. The procedure, as well as the associated risks and benefits, were described at length, particularly with respect to the risk of infection and the risk of injury to the extension cables. We also discussed the options of a rechargeable versus non-rechargeable system and the pros/cons of each. Specifically, with a rechargeable generator, you would have 15 years before needing a replacement, however, you would also have to recharge your battery at home regularly.     2) You stated that you would like to learn more  about the rechargeable system and will consider that as an option. In the meantime, we will proceed with scheduling surgery for the earliest available date.     Provider Impressions  74 y.o. right-handed male with PMH of HTN, HLD, GERD, IBS, and alcohol use disorder, with PD diagnosed at age 41, with symptoms severe tremors, s/p awake b/l STN DBS w/ MDT in 9/2006 by Dr. Laura Simms at [facility]; also, with cognitive decline. Patient transitioned his care to  neurology, Dr. Sutton, in 2023. He has undergone bilateral DBS generator replacement approximately every 1.5-2 years, and last underwent b/l DBS generator replacement on 5/9/23 by Dr. Herve Pelayo, complicated by left incision leakage and swelling, requiring left chest wound revision and washout on 7/27/23; lacey purulence noted intraop, with negative wound and blood cultures. Patient has since healed; however, wife has expressed concern regarding progressive thinning of the generator  pockets. Patient was recently seen by JERALD Humphrey, on 2/19/25 for programming visit and was found to again be at MONISHA (L STN 2.55, R STN 2.46), in need of urgent replacement, consider rechargeable system if wife and pt are open to it; wife is nervous bc pt's memory is declining.    History of Present Illness  Chief Complaint:   Chief Complaint   Patient presents with    DBS generator replacement         HPI: Thomas Frankel is a 74 y.o. right-handed male with PMH of HTN, HLD, GERD, IBS, and alcohol use disorder, with PD diagnosed at age 41, with symptoms severe tremors, s/p awake b/l STN DBS w/ MDT in 9/2006 by Dr. Laura Simms at [facility]; also, with cognitive decline. Patient transitioned his care to  neurology, Dr. Sutton, in 2023. He has undergone bilateral DBS generator replacement approximately every 1.5-2 years, and last underwent b/l DBS generator replacement on 5/9/23 by Dr. Herve Pelayo, complicated by left incision leakage and swelling, requiring left chest wound revision and washout on 7/27/23; lacey purulence noted intraop, with negative wound and blood cultures. Patient has since healed; however, wife has expressed concern regarding progressive thinning of the generator pockets. Patient was recently seen by JERALD Humphrey, on 2/19/25 for programming visit and was found to again be at MONISHA (L STN 2.55, R STN 2.46), in need of urgent replacement, consider rechargeable system. Patient presents today to discuss DBS generator replacement, as Dr. Pelayo is no longer with the system.     Patient presents today with his wife. His wife states that they have a home remote, but she never turned it out and has not been monitoring his devices, so was unaware that his batteries were in need of urgent replacement. Patient reports that his symptoms are overall well controlled with DBS and denies notable worsening of his symptoms as he nears MONISHA. He reports that his symptoms are primarily bilateral UE tremor, though, his wife  reports that his gait and speech have slowly worsened over time. He ambulates with a cane for stability.      Interrogation from Today's Visit:  Left Chest/Left STN  Device: Metronic Activa SC (Implanted 5/9/23)   Battery: 2.54V (MONISHA)  Impedances: All OK   C+3-2- 3.6V (2.80-4.60)/90us/185Hz     Right Chest/Right STN   Device: Medtronic Activa SC (Implanted 5/9/23)  Battery: 2.45V (MONISHA)  Impedances: All OK   3-2+ 3.95V (2.95-4.95)/90us/185Hz       ROS: As noted in HPI.      Previous History  Past Medical History:   Diagnosis Date    Alcohol use disorder, moderate, dependence (Multi) 10/23/2020    Contusion of left foot, initial encounter 04/29/2019    Contusion of left foot, initial encounter    Major depressive disorder, recurrent, unspecified 10/23/2020    Other conditions influencing health status 01/02/2016    History of cough    Pain in left lower leg 04/29/2019    Pain of left calf    Personal history of diseases of the skin and subcutaneous tissue 07/13/2019    History of cellulitis    Personal history of other drug therapy 09/28/2016    History of influenza vaccination    Personal history of other drug therapy 09/28/2016    History of pneumococcal vaccination    Unspecified injury of unspecified lower leg, initial encounter 07/13/2019    Knee injury     Past Surgical History:   Procedure Laterality Date    DEEP BRAIN STIMULATOR PLACEMENT      OTHER SURGICAL HISTORY  10/09/2019    Tonsillectomy    OTHER SURGICAL HISTORY  07/26/2022    Rib fracture repair    OTHER SURGICAL HISTORY  07/26/2022    Lung wedge resection     Social History     Tobacco Use    Smoking status: Never    Smokeless tobacco: Never   Vaping Use    Vaping status: Never Used   Substance Use Topics    Alcohol use: Yes     Alcohol/week: 4.0 standard drinks of alcohol     Types: 4 Shots of liquor per week    Drug use: Never     Family History   Problem Relation Name Age of Onset    Colon cancer Neg Hx       No Known Allergies  Current Outpatient  Medications   Medication Instructions    acetaminophen (Tylenol) 325 mg tablet oral, Every 4 hours RT    atorvastatin (LIPITOR) 20 mg, oral, Daily    calcium carbonate-vitamin D3 600 mg-10 mcg (400 unit) tablet oral, 2 times daily    carbidopa-levodopa-entacapone (Stalevo) 37.5-150-200 mg tablet Take one tablet 5 times a day    cholecalciferol (Vitamin D-3) 50 MCG (2000 UT) tablet 1 tablet, oral, Daily    cyanocobalamin (Vitamin B-12) 1,000 mcg tablet 1 tablet, oral, Daily    fludrocortisone (FLORINEF) 0.1 mg, oral, Daily before breakfast    Myrbetriq 25 mg tablet extended release 24 hr 24 hr tablet 2 tablets, oral, Daily    polyethylene glycol (GLYCOLAX, MIRALAX) 17 g, oral         Vitals  /76   Pulse (!) 111   Resp 16   Ht 1.829 m (6')   Wt 96.6 kg (213 lb)   BMI 28.89 kg/m²       Physical Exam  Well appearing, in no acute distress. Mild left hand resting tremor and bilateral kinetic tremor, L>R. Mild rigidity, UE>LE and L>R. Mildly stooped gait. Bilateral chest incisions with some mild thinning of skin, but well healed and intact.

## 2025-02-20 NOTE — H&P (VIEW-ONLY)
[facility]   Neurosurgery    Diagnosis  Kieran was seen today for dbs generator replacement.  Diagnoses and all orders for this visit:  Parkinson's disease with dyskinesia and fluctuating manifestations  Status post deep brain stimulator placement  End of battery life of deep brain stimulator      Patient Discussion/Summary  1) Today we discussed the need for replacement of your DBS generators on both sides of your chest, at both are at risk for declining soon. The procedure, as well as the associated risks and benefits, were described at length, particularly with respect to the risk of infection and the risk of injury to the extension cables. We also discussed the options of a rechargeable versus non-rechargeable system and the pros/cons of each. Specifically, with a rechargeable generator, you would have 15 years before needing a replacement, however, you would also have to recharge your battery at home regularly.     2) You stated that you would like to learn more  about the rechargeable system and will consider that as an option. In the meantime, we will proceed with scheduling surgery for the earliest available date.     Provider Impressions  74 y.o. right-handed male with PMH of HTN, HLD, GERD, IBS, and alcohol use disorder, with PD diagnosed at age 41, with symptoms severe tremors, s/p awake b/l STN DBS w/ MDT in 9/2006 by Dr. Laura Simms at [facility]; also, with cognitive decline. Patient transitioned his care to  neurology, Dr. Sutton, in 2023. He has undergone bilateral DBS generator replacement approximately every 1.5-2 years, and last underwent b/l DBS generator replacement on 5/9/23 by Dr. Herve Pelayo, complicated by left incision leakage and swelling, requiring left chest wound revision and washout on 7/27/23; lacey purulence noted intraop, with negative wound and blood cultures. Patient has since healed; however, wife has expressed concern regarding progressive thinning of the generator  pockets. Patient was recently seen by JERALD Humphrey, on 2/19/25 for programming visit and was found to again be at MONISHA (L STN 2.55, R STN 2.46), in need of urgent replacement, consider rechargeable system if wife and pt are open to it; wife is nervous bc pt's memory is declining.    History of Present Illness  Chief Complaint:   Chief Complaint   Patient presents with    DBS generator replacement         HPI: Thomas Frankel is a 74 y.o. right-handed male with PMH of HTN, HLD, GERD, IBS, and alcohol use disorder, with PD diagnosed at age 41, with symptoms severe tremors, s/p awake b/l STN DBS w/ MDT in 9/2006 by Dr. Laura Simms at [facility]; also, with cognitive decline. Patient transitioned his care to  neurology, Dr. Sutton, in 2023. He has undergone bilateral DBS generator replacement approximately every 1.5-2 years, and last underwent b/l DBS generator replacement on 5/9/23 by Dr. Herve Pelayo, complicated by left incision leakage and swelling, requiring left chest wound revision and washout on 7/27/23; lacey purulence noted intraop, with negative wound and blood cultures. Patient has since healed; however, wife has expressed concern regarding progressive thinning of the generator pockets. Patient was recently seen by JERALD Humphrey, on 2/19/25 for programming visit and was found to again be at MONISHA (L STN 2.55, R STN 2.46), in need of urgent replacement, consider rechargeable system. Patient presents today to discuss DBS generator replacement, as Dr. Pelayo is no longer with the system.     Patient presents today with his wife. His wife states that they have a home remote, but she never turned it out and has not been monitoring his devices, so was unaware that his batteries were in need of urgent replacement. Patient reports that his symptoms are overall well controlled with DBS and denies notable worsening of his symptoms as he nears MONISHA. He reports that his symptoms are primarily bilateral UE tremor, though, his wife  reports that his gait and speech have slowly worsened over time. He ambulates with a cane for stability.      Interrogation from Today's Visit:  Left Chest/Left STN  Device: Metronic Activa SC (Implanted 5/9/23)   Battery: 2.54V (MONISHA)  Impedances: All OK   C+3-2- 3.6V (2.80-4.60)/90us/185Hz     Right Chest/Right STN   Device: Medtronic Activa SC (Implanted 5/9/23)  Battery: 2.45V (MONISHA)  Impedances: All OK   3-2+ 3.95V (2.95-4.95)/90us/185Hz       ROS: As noted in HPI.      Previous History  Past Medical History:   Diagnosis Date    Alcohol use disorder, moderate, dependence (Multi) 10/23/2020    Contusion of left foot, initial encounter 04/29/2019    Contusion of left foot, initial encounter    Major depressive disorder, recurrent, unspecified 10/23/2020    Other conditions influencing health status 01/02/2016    History of cough    Pain in left lower leg 04/29/2019    Pain of left calf    Personal history of diseases of the skin and subcutaneous tissue 07/13/2019    History of cellulitis    Personal history of other drug therapy 09/28/2016    History of influenza vaccination    Personal history of other drug therapy 09/28/2016    History of pneumococcal vaccination    Unspecified injury of unspecified lower leg, initial encounter 07/13/2019    Knee injury     Past Surgical History:   Procedure Laterality Date    DEEP BRAIN STIMULATOR PLACEMENT      OTHER SURGICAL HISTORY  10/09/2019    Tonsillectomy    OTHER SURGICAL HISTORY  07/26/2022    Rib fracture repair    OTHER SURGICAL HISTORY  07/26/2022    Lung wedge resection     Social History     Tobacco Use    Smoking status: Never    Smokeless tobacco: Never   Vaping Use    Vaping status: Never Used   Substance Use Topics    Alcohol use: Yes     Alcohol/week: 4.0 standard drinks of alcohol     Types: 4 Shots of liquor per week    Drug use: Never     Family History   Problem Relation Name Age of Onset    Colon cancer Neg Hx       No Known Allergies  Current Outpatient  Medications   Medication Instructions    acetaminophen (Tylenol) 325 mg tablet oral, Every 4 hours RT    atorvastatin (LIPITOR) 20 mg, oral, Daily    calcium carbonate-vitamin D3 600 mg-10 mcg (400 unit) tablet oral, 2 times daily    carbidopa-levodopa-entacapone (Stalevo) 37.5-150-200 mg tablet Take one tablet 5 times a day    cholecalciferol (Vitamin D-3) 50 MCG (2000 UT) tablet 1 tablet, oral, Daily    cyanocobalamin (Vitamin B-12) 1,000 mcg tablet 1 tablet, oral, Daily    fludrocortisone (FLORINEF) 0.1 mg, oral, Daily before breakfast    Myrbetriq 25 mg tablet extended release 24 hr 24 hr tablet 2 tablets, oral, Daily    polyethylene glycol (GLYCOLAX, MIRALAX) 17 g, oral         Vitals  /76   Pulse (!) 111   Resp 16   Ht 1.829 m (6')   Wt 96.6 kg (213 lb)   BMI 28.89 kg/m²       Physical Exam  Well appearing, in no acute distress. Mild left hand resting tremor and bilateral kinetic tremor, L>R. Mild rigidity, UE>LE and L>R. Mildly stooped gait. Bilateral chest incisions with some mild thinning of skin, but well healed and intact.

## 2025-02-21 ENCOUNTER — PREP FOR PROCEDURE (OUTPATIENT)
Dept: NEUROSURGERY | Facility: CLINIC | Age: 75
End: 2025-02-21

## 2025-02-21 ENCOUNTER — OFFICE VISIT (OUTPATIENT)
Dept: NEUROSURGERY | Facility: CLINIC | Age: 75
End: 2025-02-21
Payer: MEDICARE

## 2025-02-21 VITALS
DIASTOLIC BLOOD PRESSURE: 76 MMHG | HEIGHT: 72 IN | HEART RATE: 111 BPM | BODY MASS INDEX: 28.85 KG/M2 | WEIGHT: 213 LBS | RESPIRATION RATE: 16 BRPM | SYSTOLIC BLOOD PRESSURE: 119 MMHG

## 2025-02-21 DIAGNOSIS — Z96.89 STATUS POST DEEP BRAIN STIMULATOR PLACEMENT: ICD-10-CM

## 2025-02-21 DIAGNOSIS — Z45.42 END OF BATTERY LIFE OF DEEP BRAIN STIMULATOR: ICD-10-CM

## 2025-02-21 DIAGNOSIS — G20.B2 PARKINSON'S DISEASE WITH DYSKINESIA AND FLUCTUATING MANIFESTATIONS: Primary | ICD-10-CM

## 2025-02-21 PROCEDURE — 1126F AMNT PAIN NOTED NONE PRSNT: CPT | Performed by: NEUROLOGICAL SURGERY

## 2025-02-21 PROCEDURE — 1159F MED LIST DOCD IN RCRD: CPT | Performed by: NEUROLOGICAL SURGERY

## 2025-02-21 PROCEDURE — 99204 OFFICE O/P NEW MOD 45 MIN: CPT | Performed by: NEUROLOGICAL SURGERY

## 2025-02-21 PROCEDURE — 99214 OFFICE O/P EST MOD 30 MIN: CPT | Performed by: NEUROLOGICAL SURGERY

## 2025-02-21 PROCEDURE — 3078F DIAST BP <80 MM HG: CPT | Performed by: NEUROLOGICAL SURGERY

## 2025-02-21 PROCEDURE — 3074F SYST BP LT 130 MM HG: CPT | Performed by: NEUROLOGICAL SURGERY

## 2025-02-21 PROCEDURE — 1036F TOBACCO NON-USER: CPT | Performed by: NEUROLOGICAL SURGERY

## 2025-02-21 PROCEDURE — 95983 ALYS BRN NPGT PRGRMG 15 MIN: CPT | Performed by: NURSE PRACTITIONER

## 2025-02-21 RX ORDER — CEFAZOLIN SODIUM 2 G/100ML
2 INJECTION, SOLUTION INTRAVENOUS ONCE
OUTPATIENT
Start: 2025-02-21 | End: 2025-02-21

## 2025-02-21 ASSESSMENT — PAIN SCALES - GENERAL: PAINLEVEL_OUTOF10: 0-NO PAIN

## 2025-02-24 ENCOUNTER — CLINICAL SUPPORT (OUTPATIENT)
Dept: PREADMISSION TESTING | Facility: HOSPITAL | Age: 75
End: 2025-02-24
Payer: MEDICARE

## 2025-02-24 DIAGNOSIS — Z96.89 STATUS POST DEEP BRAIN STIMULATOR PLACEMENT: ICD-10-CM

## 2025-02-24 DIAGNOSIS — G20.B2 PARKINSON'S DISEASE WITH DYSKINESIA AND FLUCTUATING MANIFESTATIONS: ICD-10-CM

## 2025-02-24 DIAGNOSIS — Z45.42 END OF BATTERY LIFE OF DEEP BRAIN STIMULATOR: ICD-10-CM

## 2025-02-24 RX ORDER — MELATONIN 5 MG
6 CAPSULE ORAL NIGHTLY
COMMUNITY

## 2025-02-24 RX ORDER — AMOXICILLIN 250 MG
2 CAPSULE ORAL NIGHTLY
COMMUNITY
End: 2025-03-06 | Stop reason: HOSPADM

## 2025-02-24 RX ORDER — VITAMIN B COMPLEX
1 CAPSULE ORAL DAILY
COMMUNITY

## 2025-02-24 RX ORDER — DONEPEZIL HYDROCHLORIDE 10 MG/1
10 TABLET, FILM COATED ORAL NIGHTLY
COMMUNITY

## 2025-02-24 NOTE — CPM/PAT NURSE NOTE
CPM/PAT Nurse Note      Name: Thomas Frankel (Thomas Frankel)  /Age: 1950/74 y.o.       Past Medical History:   Diagnosis Date    Alcohol use disorder, moderate, dependence (Multi) 10/23/2020    Anxiety     Cognitive decline     Dysphagia 2025    modified barrium swallow WNL    Gait disorder     GERD (gastroesophageal reflux disease)     improved, no meds    Hearing aid worn     History of hepatitis     thinks it was Hep A    Incontinence of urine     Major depressive disorder, recurrent, unspecified 10/23/2020    Parkinson disease (Multi)     Spinal stenosis        Past Surgical History:   Procedure Laterality Date    ANKLE FRACTURE SURGERY Left     w/ instrumentation    CATARACT EXTRACTION, BILATERAL      DEEP BRAIN STIMULATOR PLACEMENT      DEEP BRAIN STIMULATOR PLACEMENT      battery changes Q 18 months    INSERTION / REMOVAL CRANIAL DBS GENERATOR  2023    generator change    LAMINECTOMY      LUMBAR LAMINECTOMY      revision    RIB FRACTURE SURGERY  2022    Rib fracture repair    TONSILLECTOMY      as a child    WISDOM TOOTH EXTRACTION      WOUND DEBRIDEMENT  2023    BDS incision       Patient  has no history on file for sexual activity.    Family History   Problem Relation Name Age of Onset    Leukemia Mother Doris Frankel     Blood Disorder Mother Doris Frankel     Heart disease Father      Breast cancer Sister      Brain cancer Brother      Hypertension Brother      Colon cancer Neg Hx         No Known Allergies    Prior to Admission medications    Medication Sig Start Date End Date Taking? Authorizing Provider   acetaminophen (Tylenol) 325 mg tablet Take by mouth every 4 hours. 22   Historical Provider, MD   atorvastatin (Lipitor) 20 mg tablet Take 1 tablet (20 mg) by mouth once daily. 25  Sumit Toscano MD   b complex vitamins capsule Take 1 capsule by mouth once daily.    Historical Provider, MD   carbidopa-levodopa-entacapone (Stalevo)  37.5-150-200 mg tablet Take one tablet 5 times a day 1/10/25   Severine L Kako, MD   cholecalciferol (Vitamin D-3) 50 MCG (2000 UT) tablet Take 1 tablet (50 mcg) by mouth once daily.    Historical Provider, MD   cyanocobalamin (Vitamin B-12) 1,000 mcg tablet Take 1 tablet (1,000 mcg) by mouth once daily.  Patient not taking: Reported on 2/24/2025    Historical Provider, MD   donepezil (Aricept) 10 mg tablet Take 1 tablet (10 mg) by mouth once daily at bedtime.    Historical Provider, MD   fludrocortisone (Florinef) 0.1 mg tablet Take 1 tablet (0.1 mg) by mouth once daily in the morning. Take before meals. 2/19/25   CHARMAINE Drake-CNP   MAGNESIUM CITRATE ORAL Take 400 mg by mouth once daily.    Historical Provider, MD   melatonin 5 mg capsule Take 1.2 capsules (6 mg) by mouth once daily at bedtime. Starting 2/24/25, take 6 mg nightly x 2 weeks, then may increase to 9 mg nightly    Historical Provider, MD   Myrbetriq 25 mg tablet extended release 24 hr 24 hr tablet Take 2 tablets (50 mg) by mouth once daily. 3/15/23   Historical Provider, MD   polyethylene glycol (Glycolax) 17 gram/dose powder Mix 17 g of powder and drink once daily as needed. 7/11/22   Historical Provider, MD   sennosides-docusate sodium (Senna Plus) 8.6-50 mg tablet Take 2 tablets by mouth once daily at bedtime.    Historical Provider, MD   calcium carbonate-vitamin D3 600 mg-10 mcg (400 unit) tablet Take by mouth twice a day. 3/13/21 2/24/25  Historical Provider, MD   fludrocortisone (Florinef) 0.1 mg tablet Take 1 tablet (0.1 mg) by mouth once daily in the morning. Take before meals.  2/19/25  Historical Provider, MD   OXcarbazepine (Trileptal) 300 mg tablet Take 2 tablets (600 mg) by mouth in the morning and 2 tablets (600 mg) before bedtime. 4/1/21 2/19/25  Historical Provider, MD   oxybutynin (Ditropan) 5 mg tablet  12/20/17 2/19/25  Historical Provider, MD HOPE AGUSTIN     DASI Risk Score    No data to display       Caprini DVT  Assessment    No data to display       Modified Frailty Index    No data to display       CHADS2 Stroke Risk  Current as of 13 minutes ago        N/A 3 to 100%: High Risk   2 to < 3%: Medium Risk   0 to < 2%: Low Risk     Last Change: N/A          This score determines the patient's risk of having a stroke if the patient has atrial fibrillation.        This score is not applicable to this patient. Components are not calculated.          Revised Cardiac Risk Index    No data to display       Apfel Simplified Score    No data to display       Risk Analysis Index Results This Encounter    No data found in the last 10 encounters.       Prodigy: High Risk  Total Score: 20              Prodigy Age Score      Prodigy Gender Score          ARISCAT Score for Postoperative Pulmonary Complications    No data to display       Batista Perioperative Risk for Myocardial Infarction or Cardiac Arrest (JOHANNA)    No data to display         Nurse Plan of Action:     RN screening call complete.  Reviewed allergies, medications and pharmacy, medical, surgical and social history with patient.  Chart updated.

## 2025-02-27 ENCOUNTER — DOCUMENTATION (OUTPATIENT)
Dept: PREADMISSION TESTING | Facility: HOSPITAL | Age: 75
End: 2025-02-27

## 2025-02-27 ENCOUNTER — LAB (OUTPATIENT)
Dept: LAB | Facility: HOSPITAL | Age: 75
End: 2025-02-27
Payer: MEDICARE

## 2025-02-27 ENCOUNTER — PRE-ADMISSION TESTING (OUTPATIENT)
Dept: PREADMISSION TESTING | Facility: HOSPITAL | Age: 75
End: 2025-02-27
Payer: MEDICARE

## 2025-02-27 VITALS
WEIGHT: 209.44 LBS | HEART RATE: 110 BPM | TEMPERATURE: 97 F | SYSTOLIC BLOOD PRESSURE: 137 MMHG | DIASTOLIC BLOOD PRESSURE: 76 MMHG | RESPIRATION RATE: 16 BRPM | HEIGHT: 72 IN | BODY MASS INDEX: 28.37 KG/M2 | OXYGEN SATURATION: 97 %

## 2025-02-27 DIAGNOSIS — R06.02 SOB (SHORTNESS OF BREATH): ICD-10-CM

## 2025-02-27 DIAGNOSIS — R73.09 ELEVATED GLUCOSE: ICD-10-CM

## 2025-02-27 DIAGNOSIS — R30.0 DYSURIA: ICD-10-CM

## 2025-02-27 DIAGNOSIS — I10 PRIMARY HYPERTENSION: Primary | ICD-10-CM

## 2025-02-27 LAB
ABO GROUP (TYPE) IN BLOOD: NORMAL
ANION GAP SERPL CALC-SCNC: 11 MMOL/L (ref 10–20)
ANTIBODY SCREEN: NORMAL
APPEARANCE UR: CLEAR
APTT PPP: 26 SECONDS (ref 26–36)
ATRIAL RATE: 101 BPM
BASOPHILS # BLD AUTO: 0.03 X10*3/UL (ref 0–0.1)
BASOPHILS NFR BLD AUTO: 0.6 %
BILIRUB UR STRIP.AUTO-MCNC: NEGATIVE MG/DL
BUN SERPL-MCNC: 18 MG/DL (ref 6–23)
CALCIUM SERPL-MCNC: 9.2 MG/DL (ref 8.6–10.3)
CHLORIDE SERPL-SCNC: 110 MMOL/L (ref 98–107)
CO2 SERPL-SCNC: 27 MMOL/L (ref 21–32)
COLOR UR: ABNORMAL
CREAT SERPL-MCNC: 0.84 MG/DL (ref 0.5–1.3)
EGFRCR SERPLBLD CKD-EPI 2021: >90 ML/MIN/1.73M*2
EOSINOPHIL # BLD AUTO: 0.26 X10*3/UL (ref 0–0.4)
EOSINOPHIL NFR BLD AUTO: 4.9 %
ERYTHROCYTE [DISTWIDTH] IN BLOOD BY AUTOMATED COUNT: 12.6 % (ref 11.5–14.5)
GLUCOSE SERPL-MCNC: 108 MG/DL (ref 74–99)
GLUCOSE UR STRIP.AUTO-MCNC: NORMAL MG/DL
HCT VFR BLD AUTO: 42.9 % (ref 41–52)
HGB BLD-MCNC: 14.5 G/DL (ref 13.5–17.5)
IMM GRANULOCYTES # BLD AUTO: 0.02 X10*3/UL (ref 0–0.5)
IMM GRANULOCYTES NFR BLD AUTO: 0.4 % (ref 0–0.9)
INR PPP: 1 (ref 0.9–1.1)
KETONES UR STRIP.AUTO-MCNC: ABNORMAL MG/DL
LEUKOCYTE ESTERASE UR QL STRIP.AUTO: NEGATIVE
LYMPHOCYTES # BLD AUTO: 1.11 X10*3/UL (ref 0.8–3)
LYMPHOCYTES NFR BLD AUTO: 20.9 %
MCH RBC QN AUTO: 31.8 PG (ref 26–34)
MCHC RBC AUTO-ENTMCNC: 33.8 G/DL (ref 32–36)
MCV RBC AUTO: 94 FL (ref 80–100)
MONOCYTES # BLD AUTO: 0.63 X10*3/UL (ref 0.05–0.8)
MONOCYTES NFR BLD AUTO: 11.9 %
MUCOUS THREADS #/AREA URNS AUTO: NORMAL /LPF
NEUTROPHILS # BLD AUTO: 3.26 X10*3/UL (ref 1.6–5.5)
NEUTROPHILS NFR BLD AUTO: 61.3 %
NITRITE UR QL STRIP.AUTO: NEGATIVE
NRBC BLD-RTO: 0 /100 WBCS (ref 0–0)
P OFFSET: 167 MS
P ONSET: 145 MS
PH UR STRIP.AUTO: 5.5 [PH]
PLATELET # BLD AUTO: 232 X10*3/UL (ref 150–450)
POTASSIUM SERPL-SCNC: 4.8 MMOL/L (ref 3.5–5.3)
PR INTERVAL: 154 MS
PROT UR STRIP.AUTO-MCNC: ABNORMAL MG/DL
PROTHROMBIN TIME: 10.6 SECONDS (ref 9.8–12.4)
Q ONSET: 222 MS
QRS COUNT: 17 BEATS
QRS DURATION: 64 MS
QT INTERVAL: 348 MS
QTC CALCULATION(BAZETT): 451 MS
QTC FREDERICIA: 414 MS
R AXIS: 31 DEGREES
RBC # BLD AUTO: 4.56 X10*6/UL (ref 4.5–5.9)
RBC # UR STRIP.AUTO: NEGATIVE MG/DL
RBC #/AREA URNS AUTO: NORMAL /HPF
RH FACTOR (ANTIGEN D): NORMAL
SODIUM SERPL-SCNC: 143 MMOL/L (ref 136–145)
SP GR UR STRIP.AUTO: 1.04
T AXIS: 32 DEGREES
T OFFSET: 396 MS
UROBILINOGEN UR STRIP.AUTO-MCNC: NORMAL MG/DL
VENTRICULAR RATE: 101 BPM
WBC # BLD AUTO: 5.3 X10*3/UL (ref 4.4–11.3)
WBC #/AREA URNS AUTO: NORMAL /HPF

## 2025-02-27 PROCEDURE — 93005 ELECTROCARDIOGRAM TRACING: CPT | Performed by: NURSE PRACTITIONER

## 2025-02-27 PROCEDURE — 81001 URINALYSIS AUTO W/SCOPE: CPT

## 2025-02-27 PROCEDURE — 83036 HEMOGLOBIN GLYCOSYLATED A1C: CPT

## 2025-02-27 PROCEDURE — 85730 THROMBOPLASTIN TIME PARTIAL: CPT

## 2025-02-27 PROCEDURE — 80048 BASIC METABOLIC PNL TOTAL CA: CPT

## 2025-02-27 PROCEDURE — 87081 CULTURE SCREEN ONLY: CPT | Mod: AHULAB | Performed by: NURSE PRACTITIONER

## 2025-02-27 PROCEDURE — 86850 RBC ANTIBODY SCREEN: CPT

## 2025-02-27 PROCEDURE — 86901 BLOOD TYPING SEROLOGIC RH(D): CPT

## 2025-02-27 PROCEDURE — 99204 OFFICE O/P NEW MOD 45 MIN: CPT | Performed by: NURSE PRACTITIONER

## 2025-02-27 PROCEDURE — 36415 COLL VENOUS BLD VENIPUNCTURE: CPT

## 2025-02-27 PROCEDURE — 86900 BLOOD TYPING SEROLOGIC ABO: CPT

## 2025-02-27 PROCEDURE — 85610 PROTHROMBIN TIME: CPT

## 2025-02-27 PROCEDURE — 85025 COMPLETE CBC W/AUTO DIFF WBC: CPT

## 2025-02-27 RX ORDER — CHLORHEXIDINE GLUCONATE ORAL RINSE 1.2 MG/ML
SOLUTION DENTAL
Qty: 473 ML | Refills: 0 | Status: SHIPPED | OUTPATIENT
Start: 2025-02-27 | End: 2025-03-06 | Stop reason: HOSPADM

## 2025-02-27 ASSESSMENT — ENCOUNTER SYMPTOMS
CARDIOVASCULAR NEGATIVE: 1
NECK NEGATIVE: 1
TREMORS: 1
ARTHRALGIAS: 1
COUGH: 1
GASTROINTESTINAL NEGATIVE: 1
CONSTITUTIONAL NEGATIVE: 1
TROUBLE SWALLOWING: 1
ENDOCRINE NEGATIVE: 1

## 2025-02-27 NOTE — PREPROCEDURE INSTRUCTIONS
Medication List            Accurate as of February 27, 2025  3:07 PM. Always use your most recent med list.                acetaminophen 325 mg tablet  Commonly known as: Tylenol  Medication Adjustments for Surgery: Take/Use as prescribed     atorvastatin 20 mg tablet  Commonly known as: Lipitor  Take 1 tablet (20 mg) by mouth once daily.  Medication Adjustments for Surgery: Take/Use as prescribed     b complex vitamins capsule  Additional Medication Adjustments for Surgery: Take last dose 7 days before surgery     carbidopa-levodopa-entacapone 37.5-150-200 mg tablet  Commonly known as: Stalevo  Take one tablet 5 times a day  Medication Adjustments for Surgery: Take/Use as prescribed     chlorhexidine 0.12 % solution  Commonly known as: Peridex  SWISH AND SPIT 15ML FOR 30 SECONDS NIGHT PRIOR TO SURGERY AND MORNING OF SURGERY  Medication Adjustments for Surgery: Take/Use as prescribed     cholecalciferol 50 MCG (2000 UT) tablet  Commonly known as: Vitamin D-3  Additional Medication Adjustments for Surgery: Take last dose 7 days before surgery     cyanocobalamin 1,000 mcg tablet  Commonly known as: Vitamin B-12  Additional Medication Adjustments for Surgery: Take last dose 7 days before surgery     donepezil 10 mg tablet  Commonly known as: Aricept  Medication Adjustments for Surgery: Take/Use as prescribed     fludrocortisone 0.1 mg tablet  Commonly known as: Florinef  Take 1 tablet (0.1 mg) by mouth once daily in the morning. Take before meals.  Medication Adjustments for Surgery: Take/Use as prescribed     MAGNESIUM CITRATE ORAL  Medication Adjustments for Surgery: Do Not take on the morning of surgery     melatonin 5 mg capsule  Medication Adjustments for Surgery: Take/Use as prescribed     Myrbetriq 25 mg tablet extended release 24 hr 24 hr tablet  Generic drug: mirabegron  Medication Adjustments for Surgery: Do Not take on the morning of surgery     polyethylene glycol 17 gram/dose powder  Commonly known as:  Glycolax, Miralax  Medication Adjustments for Surgery: Do Not take on the morning of surgery     Senna Plus 8.6-50 mg tablet  Generic drug: sennosides-docusate sodium  Medication Adjustments for Surgery: Do Not take on the morning of surgery                  Preoperative Brain Exercises    What are brain exercises?  A brain exercise is any activity that engages your thinking (cognitive) skills.    What types of activities are considered brain exercises?  Jigsaw puzzles, crossword puzzles, word jumble, memory games, word search, and many more.  Many can be found free online or on your phone via a mobile darcie.    Why should I do brain exercises before my surgery?  More recent research has shown brain exercise before surgery can lower the risk of postoperative delirium (confusion) which can be especially important for older adults.  Patients who did brain exercises for 5 to 10 hours the days before surgery, cut their risk of postoperative delirium in half up to 1 week after surgery.        Preoperative Deep Breathing Exercises  Why it is important to do deep breathing exercises before my surgery?  Deep breathing exercises strengthen your breathing muscles.  This helps you to recover after your surgery and decreases the chance of breathing complications.  How are the deep breathing exercises done?  Sit straight with your back supported.  Breathe in deeply and slowly through your nose. Your lower rib cage should expand and your abdomen may move forward.  Hold that breath for 3 to 5 seconds.  Breathe out through pursed lips, slowly and completely.  Rest and repeat 10 times every hour while awake.  Rest longer if you become dizzy or lightheaded.        CONTACT SURGEON'S OFFICE IF YOU DEVELOP:  * Fever = 100.4 F   * New respiratory symptoms (e.g. cough, shortness of breath, respiratory distress, sore throat)  * Recent loss of taste or smell  *Flu like symptoms such as headache, fatigue or gastrointestinal symptoms  * You  develop any open sores, shingles, burning or painful urination   AND/OR:  * You no longer wish to have the surgery.  * Any other personal circumstances change that may lead to the need to cancel or defer this surgery.  *You were admitted to any hospital within one week of your planned procedure.    SMOKING:  *Quitting smoking can make a huge difference to your health and recovery from surgery.    *If you need help with quitting, call 6-163-QUIT-NOW.    THE DAY OF SURGERY:  *Do not eat any food after midnight the night before your surgery.   *YOU MUST drink 14 OUNCES of clear liquids TWO hours before your instructed ARRIVAL TIME to the hospital. This includes water, black tea/coffee (no milk or cream), apple juice, clear broth and electrolyte drinks (Gatorade).  Please avoid clear liquids that are red in color.   *You may chew gum/mints up to TWO hours before your surgery/procedure.    SURGICAL TIME:  *You will be contacted between 2 p.m. and 6 p.m. the business day before your surgery with your arrival time.  *If you haven't received a call by 6pm, call [phone].  *Scheduled surgery times may change and you will be notified if this occurs-check your personal voicemail for any updates.    ON THE MORNING OF SURGERY:  *Wear comfortable, loose fitting clothing.   *Do not use moisturizers, creams, lotions or perfume.  *All jewelry and valuables should be left at home.  *Prosthetic devices such as contact lenses, hearing aids, dentures, eyelash extensions, hairpins and body piercing must be removed before surgery.    BRING WITH YOU:  *Photo ID and insurance card  *Current list of medications and allergies  *Pacemaker/Defibrillator/Heart stent cards  *CPAP machine and mask  *Slings/splints/crutches  *Copy of your complete Advanced Directive/DHPOA-if applicable  *Neurostimulator implant remote    PARKING AND ARRIVAL:  *Check in at the Main Entrance desk and let them know you are here for surgery.  *You will be directed  to the 2nd floor surgical waiting area.    IF YOU ARE HAVING OUTPATIENT/SAME DAY SURGERY:  *A responsible adult MUST accompany you at the time of discharge and stay with you for 24 hours after your surgery.  *You may NOT drive yourself home after surgery.  *You may use a taxi or ride sharing service ([employer], Uber) to return home ONLY if you are accompanied by a friend or family member.  *Instructions for resuming your medications will be provided by your surgeon.      Patient Information: Oral/Dental Rinse  **This is a prescription; pick it up at your preferred local pharmacy **  What is oral/dental rinse?   It is a mouthwash. It is a way of cleaning the mouth with a germ killing solution before your surgery.  The solution contains chlorhexidine, commonly known as CHG.   It is used inside the mouth to kill a bacteria known as Staphylococcus aureus.  Let your doctor know if you are allergic to Chlorhexidine.    Why do I need to use CHG oral/dental rinse?  The CHG oral/dental rinse helps to kill a bacteria in your mouth known a Staphylococcus aureus.     This reduces the risk of infection at the surgical site.      Using your CHG oral/dental rinse  STEPS:  Use your CHG oral/dental rinse after you brush your teeth the night before (at bedtime) and the morning of your surgery.  Follow all directions on your prescription label.    Use the cap on the container to measure 15ml (fill cap to fill line)  Swish (gargle if you can) the mouthwash in your mouth for at least 30 seconds, (do not to swallow) spit out  After you use your CHG rinse, do not rinse your mouth with water, drink or eat.  Please refer to prescription label for the appropriate time to resume oral intake  Dental rinse comes in one size bottle: 473ml ~16oz.  You will have leftover    rinse, discard after this use.    What side effects might I have using the CHG oral/dental rinse?  CHG rinse will stick to plaque on the teeth.  Brush and floss just before use.   Teeth brushing will help avoid staining of plaque during use.    Who should I contact if I have questions about the CHG oral/dental rinse?  Please call [facility], Preadmission Testing at [phone] if you have any questions    Home Preoperative Antibacterial Shower     What is a home preoperative antibacterial shower?  This shower is a way of cleaning the skin with a germ killing soap before surgery.  The soap contains chlorhexidine, commonly known as CHG.  CHG is a soap for your skin with germ killing ability.  Let your doctor know if you are allergic to chlorhexidine.    Why do I need to take a preoperative antibacterial shower?  Skin is not sterile.  It is best to try to make your skin as free of germs as possible before surgery.  Proper cleansing with a germ killing soap before surgery can lower the number of germs on your skin.  This helps to reduce the risk of infection at the surgical site.  Following the instructions listed below will help you prepare your skin for surgery.      How do I use the CHG skin cleanser?  Steps:  Begin using your CHG soap five days before your scheduled surgery on ________________________.    Days 1-4 Shower before bed:  Wash your face and genitals with your normal soap and rinse.           2.    Apply the CHG soap to a clean wet washcloth.  Turn the water off or move away                From the water spray to avoid premature rinsing of the CHG soap as you are applying.     3.   Lather your entire body from the neck down.  Do not use on your face or genitals.  4. Pay special attention to the area(s) where your incision(s) will be located unless they are on your face.  Avoid scrubbing your skin too hard.  The important point is to have the CHG soap sit on your skin for 3 minutes.    When the 3 minutes are up, turn on the water and rinse the CHG soap off your body completely.   Pat yourself dry with a clean, freshly-laundered towel.  Dress in clean,  freshly laundered night clothes.    Be sure to sleep with clean, freshly laundered sheets.  Day 5:  Last shower is the morning of surgery: Follow above Instructions.    NOTE:        *Keep CHG soap out of eyes and ear canals   *DO NOT wash with regular soap on your body after you have used the CHG soap solution  *DO NOT apply powders, lotions, or perfume.  *Deodorant may be used days 1-4, BUT NOT the day of surgery    Who should I contact if I have any questions regarding the use of CHG soap?  Call the [facility], Preadmission Testing at [phone] if you have any questions.              Patient Information: Pre-Operative Infection Prevention Measures     Why did I have my nose, under my arms and groin swabbed?  The purpose of the swab is to identify Staphylococcus aureus inside your nose or on your skin.  The swab was sent to the laboratory for culture.  A positive swab/culture for Staphylococcus aureus is called colonization or carriage.      What is Staphylococcus aureus?  Staphylococcus aureus, also known as “staph”, is a germ found on the skin or in the nose of healthy people.  Sometimes Staphylococcus aureus can get into the body and cause an infection.  This can be minor (such as pimples, boils or other skin problems).  It might also be serious (such as blood infection, pneumonia or a surgical site infection).    What is Staphylococcus aureus colonization or carriage?  Colonization or carriage means that a person has the germ but is not sick from it.  These bacteria can be spread on the hands or when breathing or sneezing.    How is Staphylococcus aureus spread?  It is most often spread by close contact with a person or item that carries it.    What happens if my culture is positive for Staphylococcus aureus?  Your doctor/medical team will use this information to guide any antibiotic treatment which may be necessary.  Regardless of the culture results, we will clean the inside  of your nose with a betadine swab just before you have your surgery.      Will I get an infection if I have Staphylococcus aureus in my nose or on my skin?  Anyone can get an infection with Staphylococcus aureus.  However, the best way to reduce your risk of infection is to follow the instructions provided to you for the use of your CHG soap and dental rinse.        Who should I contact if I have any questions?  Call the [facility], Preadmission Testing at [phone] if you have any questions.          Incentive Spirometer   You were provided with an incentive spirometer in CPM/PAT, please follow the below instructions.   You were not provided an incentive spirometer in [facility], please disregard the incentive spirometer instructions  What is an incentive spirometer?  An incentive spirometer is a device used before and after surgery to “exercise” your lungs.  It helps you to take deeper breaths to expand your lungs.  Below is an example of a basic incentive spirometer.  The device you receive may differ slightly but they all function the same.    Why do I need to use an incentive spirometer?  Using your incentive spirometer prepares your lungs for surgery and helps prevent lung problems after surgery.  How do I use my incentive spirometer?  When you're using your incentive spirometer, make sure to breathe through your mouth. If you breathe through your nose, the incentive spirometer won't work properly. You can hold your nose if you have trouble.  If you feel dizzy at any time, stop and rest. Try again at a later time.  Follow the steps below:  Set up your incentive spirometer, expand the flexible tubing and connect to the outlet.  Sit upright in a chair or bed. Hold the incentive spirometer at eye level.   Put the mouthpiece in your mouth and close your lips tightly around it. Slowly breathe out (exhale) completely.  Breathe in (inhale) slowly through your mouth as deeply as you can. As  you take a breath, you will see the piston rise inside the large column. While the piston rises, the indicator should move upwards. It should stay in between the 2 arrows (see Figure).  Try to get the piston as high as you can, while keeping the indicator between the arrows.   If the indicator doesn't stay between the arrows, you're breathing either too fast or too slow.  When you get it as high as you can, hold your breath for 10 seconds, or as long as possible. While you're holding your breath, the piston will slowly fall to the base of the spirometer.  Once the piston reaches the bottom of the spirometer, breathe out slowly through your mouth. Rest for a few seconds.  Repeat 10 times. Try to get the piston to the same level with each breath.  Repeat every hour while awake  You can carefully clean the outside of the mouthpiece with an alcohol wipe or soap and water.

## 2025-02-27 NOTE — CPM/PAT H&P
CPM/PAT Evaluation       Name: Thomas Frankel (Thomas Frankel)  /Age: 1950/74 y.o.     In-Person         Date of Consult: 25    Referring Provider:  Dr. Davila    Date, Surgery, and Length:  3/6/25, bilateral deep brain stimulator generator replacements-bilateral, 90 minutes              This note was created in part upon personal review of patient's medical records.        Pt denies any past history of anesthetic complications such as PONV, awareness, prolonged sedation, dental damage, aspiration, cardiac arrest, difficult intubation, difficult I.V. access or unexpected hospital admissions. No history of malignant hyperthermia and or pseudocholinesterase deficiency.    No history of blood transfusions.    The patient IS NOT a [employer] and will accept blood and blood products if medically indicated.     Type and screen sent.      Past Medical History:   Diagnosis Date    Alcohol use disorder, moderate, dependence (Multi) 10/23/2020    Anxiety     Cognitive decline     Dysphagia 2025    modified barrium swallow WNL    Gait disorder     GERD (gastroesophageal reflux disease)     improved, no meds    Hearing aid worn     History of hepatitis 1969    thinks it was Hep A    Incontinence of urine     Major depressive disorder, recurrent, unspecified 10/23/2020    Parkinson disease (Multi)     Spinal stenosis        Past Surgical History:   Procedure Laterality Date    ANKLE FRACTURE SURGERY Left     w/ instrumentation    CATARACT EXTRACTION, BILATERAL      DEEP BRAIN STIMULATOR PLACEMENT      DEEP BRAIN STIMULATOR PLACEMENT      battery changes Q 18 months    INSERTION / REMOVAL CRANIAL DBS GENERATOR  2023    generator change    LAMINECTOMY  1999    LUMBAR LAMINECTOMY      revision    RIB FRACTURE SURGERY  2022    Rib fracture repair    TONSILLECTOMY      as a child    WISDOM TOOTH EXTRACTION      WOUND DEBRIDEMENT  2023    BDS incision       Family History    Problem Relation Name Age of Onset    Leukemia Mother Doris Frankel     Blood Disorder Mother Doris Frankel     Heart disease Father      Breast cancer Sister      Brain cancer Brother      Hypertension Brother      Colon cancer Neg Hx       Social History     Tobacco Use   Smoking Status Never   Smokeless Tobacco Never     Social History     Substance and Sexual Activity   Alcohol Use Yes    Alcohol/week: 4.0 standard drinks of alcohol    Types: 4 Shots of liquor per week    Comment: trying to quit all drinking     Social History     Substance and Sexual Activity   Drug Use Never       No Known Allergies    Current Outpatient Medications   Medication Instructions    acetaminophen (Tylenol) 325 mg tablet Every 4 hours RT    atorvastatin (LIPITOR) 20 mg, oral, Daily    b complex vitamins capsule 1 capsule, Daily    carbidopa-levodopa-entacapone (Stalevo) 37.5-150-200 mg tablet Take one tablet 5 times a day    chlorhexidine (Peridex) 0.12 % solution SWISH AND SPIT 15ML FOR 30 SECONDS NIGHT PRIOR TO SURGERY AND MORNING OF SURGERY    cholecalciferol (Vitamin D-3) 50 MCG (2000 UT) tablet 1 tablet, Daily    cyanocobalamin (Vitamin B-12) 1,000 mcg tablet 1 tablet, Daily    donepezil (ARICEPT) 10 mg, Nightly    fludrocortisone (FLORINEF) 0.1 mg, oral, Daily before breakfast    MAGNESIUM CITRATE ORAL 400 mg, Daily    melatonin 6 mg, Nightly    Myrbetriq 25 mg tablet extended release 24 hr 24 hr tablet 2 tablets, Daily    polyethylene glycol (GLYCOLAX, MIRALAX) 17 g, Daily PRN    sennosides-docusate sodium (Senna Plus) 8.6-50 mg tablet 2 tablets, Nightly        PAT ROS:   Constitutional:   neg    Neuro/Psych:    tremors  Eyes:    use of corrective lenses  Ears:    hearing loss   hearing aides  Nose:   neg    Mouth:   neg    Throat:    dysphagia  Neck:   neg    Cardio:   neg    Respiratory:    cough (baseline)  Endocrine:   neg    GI:   neg    :   neg    Musculoskeletal:    arthralgias  Hematologic:   neg    Skin:  neg   "      PAT Physical Exam     Airway    Visit Vitals  /76   Pulse 110   Temp 36.1 °C (97 °F)   Resp 16   Ht 1.82 m (5' 11.65\")   Wt 95 kg (209 lb 7 oz)   SpO2 97%   BMI 28.68 kg/m²   Smoking Status Never   BSA 2.19 m²       Assessment and Plan:     Patient is a    Patient is at acceptable risk to proceed with planned surgical procedure. Further cardiac risk stratification deferred at this time. This patient is INTERMEDIATE risk candidate undergoing MODERATE-HIGH risk procedure, patient is medically optimized for surgery.    Plan      Neuro:  {CPMPATNEURO:76651}  {CPMPATDELIRIUMRISKFACTORS:95640}  {CPMPATSTROKE:15036}  {CPMPATCVARISKFACTORS:59276}    Monitor for signs and symptoms of substance withdrawal during the postoperative period, assess, and treat as needed with the appropriate monitoring tool.      Cardiovascular:    RCRI:  Risk of Mace:      .dcv    functional capacity      EKG in PAT             Pulmonary:  {CPMPATPULM:27493}  {CPMPATPULMRISKFACTORS:31205}  Stop Bang score is *** placing patient at *** risk for FARA  ARISCAT: {ARISCAT Score:71312} risk of in-hospital postoperative pulmonary complication  PRODIGY: {Low/Medium/High:84312} risk for opioid induced respiratory depression  {CPMPATPULMEDUCATION:48867}        Renal/endo:  .ckd      GI/:      Heme:  Patient instructed to ambulate as soon as possible postoperatively to decrease thromboembolic risk.    Initiate mechanical DVT prophylaxis as soon as possible and initiate chemical prophylaxis when deemed safe from a bleeding standpoint post surgery.        Caprini=        Risk assessment complete.  Patient is scheduled for a INTERMEDIATE surgical risk procedure.    He IS considered medically optimized for the planned procedure.      Labs/testing obtained in PAT on 2/27/25: CBC, BMP, COAG, A1C, T&S, MRSA, UA FLEX      Follow up/communication: none      Preoperative medication instructions were provided and reviewed with the patient.  Any additional " testing or evaluation was explained to the patient.  Nothing by mouth instructions were discussed and patient's questions were answered prior to conclusion to this encounter.  Patient verbalized understanding of preoperative instructions given in preadmission testing; discharge instructions available in EMR.    This note was dictated with speech recognition.  Minor errors may have been detected during use of speech recognition.         Date    WBC 5.3 02/27/2025    HGB 14.5 02/27/2025    HCT 42.9 02/27/2025    MCV 94 02/27/2025     02/27/2025     Lab Results   Component Value Date    GLUCOSE 108 (H) 02/27/2025    CALCIUM 9.2 02/27/2025     02/27/2025    K 4.8 02/27/2025    CO2 27 02/27/2025     (H) 02/27/2025    BUN 18 02/27/2025    CREATININE 0.84 02/27/2025     Lab Results   Component Value Date    INR 1.0 02/27/2025    INR 0.9 09/15/2023    INR 0.9 07/25/2023    PROTIME 10.6 02/27/2025    PROTIME 10.6 09/15/2023    PROTIME 10.6 07/25/2023     Lab Results   Component Value Date    HGBA1C 5.3 02/27/2025         Component 5 d ago   ABO TYPE A   Rh TYPE POS   ANTIBODY SCREEN NEG   Resulting Agency [facility]             Specimen Collected: 02/27/25 15:35 Last Resulted: 02/27/25 17:13         Follow up/communication: none      Preoperative medication instructions were provided and reviewed with the patient.  Any additional testing or evaluation was explained to the patient.  Nothing by mouth instructions were discussed and patient's questions were answered prior to conclusion to this encounter.  Patient verbalized understanding of preoperative instructions given in preadmission testing; discharge instructions available in EMR.    This note was dictated with speech recognition.  Minor errors may have been detected during use of speech recognition.

## 2025-02-27 NOTE — PREPROCEDURE INSTRUCTIONS
Medication List            Accurate as of February 27, 2025  2:41 PM. Always use your most recent med list.                acetaminophen 325 mg tablet  Commonly known as: Tylenol  Medication Adjustments for Surgery: Take/Use as prescribed     atorvastatin 20 mg tablet  Commonly known as: Lipitor  Take 1 tablet (20 mg) by mouth once daily.  Medication Adjustments for Surgery: Take/Use as prescribed     b complex vitamins capsule  Additional Medication Adjustments for Surgery: Take last dose 7 days before surgery     carbidopa-levodopa-entacapone 37.5-150-200 mg tablet  Commonly known as: Stalevo  Take one tablet 5 times a day  Medication Adjustments for Surgery: Take/Use as prescribed     chlorhexidine 0.12 % solution  Commonly known as: Peridex  SWISH AND SPIT 15ML FOR 30 SECONDS NIGHT PRIOR TO SURGERY AND MORNING OF SURGERY  Medication Adjustments for Surgery: Take/Use as prescribed     cholecalciferol 50 MCG (2000 UT) tablet  Commonly known as: Vitamin D-3  Additional Medication Adjustments for Surgery: Take last dose 7 days before surgery     cyanocobalamin 1,000 mcg tablet  Commonly known as: Vitamin B-12  Additional Medication Adjustments for Surgery: Take last dose 7 days before surgery     donepezil 10 mg tablet  Commonly known as: Aricept  Medication Adjustments for Surgery: Take/Use as prescribed     fludrocortisone 0.1 mg tablet  Commonly known as: Florinef  Take 1 tablet (0.1 mg) by mouth once daily in the morning. Take before meals.  Medication Adjustments for Surgery: Take/Use as prescribed     MAGNESIUM CITRATE ORAL  Additional Medication Adjustments for Surgery: Take last dose 7 days before surgery     melatonin 5 mg capsule  Medication Adjustments for Surgery: Take/Use as prescribed     Myrbetriq 25 mg tablet extended release 24 hr 24 hr tablet  Generic drug: mirabegron  Medication Adjustments for Surgery: Do Not take on the morning of surgery     polyethylene glycol 17 gram/dose powder  Commonly  known as: Glycolax, Miralax  Medication Adjustments for Surgery: Do Not take on the morning of surgery     Senna Plus 8.6-50 mg tablet  Generic drug: sennosides-docusate sodium  Medication Adjustments for Surgery: Do Not take on the morning of surgery                Preoperative Brain Exercises    What are brain exercises?  A brain exercise is any activity that engages your thinking (cognitive) skills.    What types of activities are considered brain exercises?  Jigsaw puzzles, crossword puzzles, word jumble, memory games, word search, and many more.  Many can be found free online or on your phone via a mobile darcie.    Why should I do brain exercises before my surgery?  More recent research has shown brain exercise before surgery can lower the risk of postoperative delirium (confusion) which can be especially important for older adults.  Patients who did brain exercises for 5 to 10 hours the days before surgery, cut their risk of postoperative delirium in half up to 1 week after surgery.        Preoperative Deep Breathing Exercises  Why it is important to do deep breathing exercises before my surgery?  Deep breathing exercises strengthen your breathing muscles.  This helps you to recover after your surgery and decreases the chance of breathing complications.  How are the deep breathing exercises done?  Sit straight with your back supported.  Breathe in deeply and slowly through your nose. Your lower rib cage should expand and your abdomen may move forward.  Hold that breath for 3 to 5 seconds.  Breathe out through pursed lips, slowly and completely.  Rest and repeat 10 times every hour while awake.  Rest longer if you become dizzy or lightheaded.        CONTACT SURGEON'S OFFICE IF YOU DEVELOP:  * Fever = 100.4 F   * New respiratory symptoms (e.g. cough, shortness of breath, respiratory distress, sore throat)  * Recent loss of taste or smell  *Flu like symptoms such as headache, fatigue or gastrointestinal symptoms  *  You develop any open sores, shingles, burning or painful urination   AND/OR:  * You no longer wish to have the surgery.  * Any other personal circumstances change that may lead to the need to cancel or defer this surgery.  *You were admitted to any hospital within one week of your planned procedure.    SMOKING:  *Quitting smoking can make a huge difference to your health and recovery from surgery.    *If you need help with quitting, call 3-763-QUIT-NOW.    THE DAY OF SURGERY:  *Do not eat any food after midnight the night before your surgery.   *YOU MUST drink 14 OUNCES of clear liquids TWO hours before your instructed ARRIVAL TIME to the hospital. This includes water, black tea/coffee (no milk or cream), apple juice, clear broth and electrolyte drinks (Gatorade).  Please avoid clear liquids that are red in color.   *You may chew gum/mints up to TWO hours before your surgery/procedure.    SURGICAL TIME:  *You will be contacted between 2 p.m. and 6 p.m. the business day before your surgery with your arrival time.  *If you haven't received a call by 6pm, call [phone].  *Scheduled surgery times may change and you will be notified if this occurs-check your personal voicemail for any updates.    ON THE MORNING OF SURGERY:  *Wear comfortable, loose fitting clothing.   *Do not use moisturizers, creams, lotions or perfume.  *All jewelry and valuables should be left at home.  *Prosthetic devices such as contact lenses, hearing aids, dentures, eyelash extensions, hairpins and body piercing must be removed before surgery.    BRING WITH YOU:  *Photo ID and insurance card  *Current list of medications and allergies  *Pacemaker/Defibrillator/Heart stent cards  *CPAP machine and mask  *Slings/splints/crutches  *Copy of your complete Advanced Directive/DHPOA-if applicable  *Neurostimulator implant remote    PARKING AND ARRIVAL:  *Check in at the Main Entrance desk and let them know you are here for surgery.  *You will be  directed to the 2nd floor surgical waiting area.    IF YOU ARE HAVING OUTPATIENT/SAME DAY SURGERY:  *A responsible adult MUST accompany you at the time of discharge and stay with you for 24 hours after your surgery.  *You may NOT drive yourself home after surgery.  *You may use a taxi or ride sharing service ([employer], [employer]) to return home ONLY if you are accompanied by a friend or family member.  *Instructions for resuming your medications will be provided by your surgeon.        Patient Information: Oral/Dental Rinse  **This is a prescription; pick it up at your preferred local pharmacy **  What is oral/dental rinse?   It is a mouthwash. It is a way of cleaning the mouth with a germ killing solution before your surgery.  The solution contains chlorhexidine, commonly known as CHG.   It is used inside the mouth to kill a bacteria known as Staphylococcus aureus.  Let your doctor know if you are allergic to Chlorhexidine.    Why do I need to use CHG oral/dental rinse?  The CHG oral/dental rinse helps to kill a bacteria in your mouth known a Staphylococcus aureus.     This reduces the risk of infection at the surgical site.      Using your CHG oral/dental rinse  STEPS:  Use your CHG oral/dental rinse after you brush your teeth the night before (at bedtime) and the morning of your surgery.  Follow all directions on your prescription label.    Use the cap on the container to measure 15ml (fill cap to fill line)  Swish (gargle if you can) the mouthwash in your mouth for at least 30 seconds, (do not to swallow) spit out  After you use your CHG rinse, do not rinse your mouth with water, drink or eat.  Please refer to prescription label for the appropriate time to resume oral intake  Dental rinse comes in one size bottle: 473ml ~16oz.  You will have leftover    rinse, discard after this use.    What side effects might I have using the CHG oral/dental rinse?  CHG rinse will stick to plaque on the teeth.  Brush and floss just  before use.  Teeth brushing will help avoid staining of plaque during use.    Who should I contact if I have questions about the CHG oral/dental rinse?  Please call [facility], Preadmission Testing at [phone] if you have any questions]    Home Preoperative Antibacterial Shower     What is a home preoperative antibacterial shower?  This shower is a way of cleaning the skin with a germ killing soap before surgery.  The soap contains chlorhexidine, commonly known as CHG.  CHG is a soap for your skin with germ killing ability.  Let your doctor know if you are allergic to chlorhexidine.    Why do I need to take a preoperative antibacterial shower?  Skin is not sterile.  It is best to try to make your skin as free of germs as possible before surgery.  Proper cleansing with a germ killing soap before surgery can lower the number of germs on your skin.  This helps to reduce the risk of infection at the surgical site.  Following the instructions listed below will help you prepare your skin for surgery.      How do I use the CHG skin cleanser?  Steps:  Begin using your CHG soap five days before your scheduled surgery on ________________________.    Days 1-4 Shower before bed:  Wash your face and genitals with your normal soap and rinse.           2.    Apply the CHG soap to a clean wet washcloth.  Turn the water off or move away                From the water spray to avoid premature rinsing of the CHG soap as you are applying.     3.   Lather your entire body from the neck down.  Do not use on your face or genitals.  4. Pay special attention to the area(s) where your incision(s) will be located unless they are on your face.  Avoid scrubbing your skin too hard.  The important point is to have the CHG soap sit on your skin for 3 minutes.    When the 3 minutes are up, turn on the water and rinse the CHG soap off your body completely.   Pat yourself dry with a clean, freshly-laundered  towel.  Dress in clean, freshly laundered night clothes.    Be sure to sleep with clean, freshly laundered sheets.  Day 5:  Last shower is the morning of surgery: Follow above Instructions.    NOTE:        *Keep CHG soap out of eyes and ear canals   *DO NOT wash with regular soap on your body after you have used the CHG soap solution  *DO NOT apply powders, lotions, or perfume.  *Deodorant may be used days 1-4, BUT NOT the day of surgery    Who should I contact if I have any questions regarding the use of CHG soap?  Call the [facility], Preadmission Testing at [phone] if you have any questions.              Patient Information: Pre-Operative Infection Prevention Measures     Why did I have my nose, under my arms and groin swabbed?  The purpose of the swab is to identify Staphylococcus aureus inside your nose or on your skin.  The swab was sent to the laboratory for culture.  A positive swab/culture for Staphylococcus aureus is called colonization or carriage.      What is Staphylococcus aureus?  Staphylococcus aureus, also known as “staph”, is a germ found on the skin or in the nose of healthy people.  Sometimes Staphylococcus aureus can get into the body and cause an infection.  This can be minor (such as pimples, boils or other skin problems).  It might also be serious (such as blood infection, pneumonia or a surgical site infection).    What is Staphylococcus aureus colonization or carriage?  Colonization or carriage means that a person has the germ but is not sick from it.  These bacteria can be spread on the hands or when breathing or sneezing.    How is Staphylococcus aureus spread?  It is most often spread by close contact with a person or item that carries it.    What happens if my culture is positive for Staphylococcus aureus?  Your doctor/medical team will use this information to guide any antibiotic treatment which may be necessary.  Regardless of the culture results,  we will clean the inside of your nose with a betadine swab just before you have your surgery.      Will I get an infection if I have Staphylococcus aureus in my nose or on my skin?  Anyone can get an infection with Staphylococcus aureus.  However, the best way to reduce your risk of infection is to follow the instructions provided to you for the use of your CHG soap and dental rinse.        Who should I contact if I have any questions?  Call the [facility], Preadmission Testing at [phone] if you have any questions.        Incentive Spirometer   You were provided with an incentive spirometer in [facility]/PAT, please follow the below instructions.   You were not provided an incentive spirometer in [facility], please disregard the incentive spirometer instructions  What is an incentive spirometer?  An incentive spirometer is a device used before and after surgery to “exercise” your lungs.  It helps you to take deeper breaths to expand your lungs.  Below is an example of a basic incentive spirometer.  The device you receive may differ slightly but they all function the same.    Why do I need to use an incentive spirometer?  Using your incentive spirometer prepares your lungs for surgery and helps prevent lung problems after surgery.  How do I use my incentive spirometer?  When you're using your incentive spirometer, make sure to breathe through your mouth. If you breathe through your nose, the incentive spirometer won't work properly. You can hold your nose if you have trouble.  If you feel dizzy at any time, stop and rest. Try again at a later time.  Follow the steps below:  Set up your incentive spirometer, expand the flexible tubing and connect to the outlet.  Sit upright in a chair or bed. Hold the incentive spirometer at eye level.   Put the mouthpiece in your mouth and close your lips tightly around it. Slowly breathe out (exhale) completely.  Breathe in (inhale) slowly through your mouth as  deeply as you can. As you take a breath, you will see the piston rise inside the large column. While the piston rises, the indicator should move upwards. It should stay in between the 2 arrows (see Figure).  Try to get the piston as high as you can, while keeping the indicator between the arrows.   If the indicator doesn't stay between the arrows, you're breathing either too fast or too slow.  When you get it as high as you can, hold your breath for 10 seconds, or as long as possible. While you're holding your breath, the piston will slowly fall to the base of the spirometer.  Once the piston reaches the bottom of the spirometer, breathe out slowly through your mouth. Rest for a few seconds.  Repeat 10 times. Try to get the piston to the same level with each breath.  Repeat every hour while awake  You can carefully clean the outside of the mouthpiece with an alcohol wipe or soap and water.

## 2025-02-27 NOTE — CPM/PAT NURSE NOTE
CPM/PAT Nurse Note      Name: Thomas Frankel (Thomas Frankel)  /Age: 1950/74 y.o.       Past Medical History:   Diagnosis Date    Alcohol use disorder, moderate, dependence (Multi) 10/23/2020    Anxiety     Cognitive decline     Dysphagia 2025    modified barrium swallow WNL    Gait disorder     GERD (gastroesophageal reflux disease)     improved, no meds    Hearing aid worn     History of hepatitis     thinks it was Hep A    Incontinence of urine     Major depressive disorder, recurrent, unspecified 10/23/2020    Parkinson disease (Multi)     Spinal stenosis        Past Surgical History:   Procedure Laterality Date    ANKLE FRACTURE SURGERY Left     w/ instrumentation    CATARACT EXTRACTION, BILATERAL      DEEP BRAIN STIMULATOR PLACEMENT      DEEP BRAIN STIMULATOR PLACEMENT      battery changes Q 18 months    INSERTION / REMOVAL CRANIAL DBS GENERATOR  2023    generator change    LAMINECTOMY      LUMBAR LAMINECTOMY      revision    RIB FRACTURE SURGERY  2022    Rib fracture repair    TONSILLECTOMY      as a child    WISDOM TOOTH EXTRACTION      WOUND DEBRIDEMENT  2023    BDS incision       Patient  has no history on file for sexual activity.    Family History   Problem Relation Name Age of Onset    Leukemia Mother Doris Frankel     Blood Disorder Mother Doris Frankel     Heart disease Father      Breast cancer Sister      Brain cancer Brother      Hypertension Brother      Colon cancer Neg Hx         No Known Allergies    Prior to Admission medications    Medication Sig Start Date End Date Taking? Authorizing Provider   acetaminophen (Tylenol) 325 mg tablet Take by mouth every 4 hours. 22   Historical Provider, MD   atorvastatin (Lipitor) 20 mg tablet Take 1 tablet (20 mg) by mouth once daily. 25  Sumit Toscano MD   b complex vitamins capsule Take 1 capsule by mouth once daily.    Historical Provider, MD   carbidopa-levodopa-entacapone (Stalevo)  37.5-150-200 mg tablet Take one tablet 5 times a day 1/10/25   Severine L Kako, MD   chlorhexidine (Peridex) 0.12 % solution SWISH AND SPIT 15ML FOR 30 SECONDS NIGHT PRIOR TO SURGERY AND MORNING OF SURGERY 2/27/25   CHARMAINE Cha-CNP   cholecalciferol (Vitamin D-3) 50 MCG (2000 UT) tablet Take 1 tablet (2,000 Units) by mouth once daily.    Historical Provider, MD   cyanocobalamin (Vitamin B-12) 1,000 mcg tablet Take 1 tablet (1,000 mcg) by mouth once daily.  Patient not taking: Reported on 2/24/2025    Historical Provider, MD   donepezil (Aricept) 10 mg tablet Take 1 tablet (10 mg) by mouth once daily at bedtime.    Historical Provider, MD   fludrocortisone (Florinef) 0.1 mg tablet Take 1 tablet (0.1 mg) by mouth once daily in the morning. Take before meals. 2/19/25   CAHRMAINE Drake-CNP   MAGNESIUM CITRATE ORAL Take 400 mg by mouth once daily.    Historical Provider, MD   melatonin 5 mg capsule Take 1.2 capsules (6 mg) by mouth once daily at bedtime. Starting 2/24/25, take 6 mg nightly x 2 weeks, then may increase to 9 mg nightly    Historical Provider, MD   Myrbetriq 25 mg tablet extended release 24 hr 24 hr tablet Take 2 tablets (50 mg) by mouth once daily. 3/15/23   Historical Provider, MD   polyethylene glycol (Glycolax) 17 gram/dose powder Mix 17 g of powder and drink once daily as needed. 7/11/22   Historical Provider, MD   sennosides-docusate sodium (Senna Plus) 8.6-50 mg tablet Take 2 tablets by mouth once daily at bedtime.    Historical Provider, MD   calcium carbonate-vitamin D3 600 mg-10 mcg (400 unit) tablet Take by mouth twice a day. 3/13/21 2/24/25  Historical Provider, MD HOPE AGUSTIN     DASI Risk Score    No data to display       Caprini DVT Assessment    No data to display       Modified Frailty Index    No data to display       CHADS2 Stroke Risk  Current as of 7 hours ago        N/A 3 to 100%: High Risk   2 to < 3%: Medium Risk   0 to < 2%: Low Risk     Last Change: N/A           This score determines the patient's risk of having a stroke if the patient has atrial fibrillation.        This score is not applicable to this patient. Components are not calculated.          Revised Cardiac Risk Index    No data to display       Apfel Simplified Score    No data to display       Risk Analysis Index Results This Encounter    No data found in the last 10 encounters.       Prodigy: High Risk  Total Score: 20              Prodigy Age Score      Prodigy Gender Score          ARISCAT Score for Postoperative Pulmonary Complications    No data to display       Batista Perioperative Risk for Myocardial Infarction or Cardiac Arrest (JOHANNA)    No data to display         Nurse Plan of Action:       After Visit Summary (AVS) reviewed and patient verbalized good understanding of medications and NPO instructions.  Pre-op infection prevention measures:  CHG showers and mouthwash reviewed, understanding voiced.  CHG soap given and patient verbalized need to pick CHG mouthwash at their preferred local pharmacy.

## 2025-02-28 LAB
EST. AVERAGE GLUCOSE BLD GHB EST-MCNC: 105 MG/DL
HBA1C MFR BLD: 5.3 %
HOLD SPECIMEN: NORMAL
TSH SERPL-ACNC: 0.96 MIU/L (ref 0.4–4.5)
VIT B12 SERPL-MCNC: 277 PG/ML (ref 200–1100)

## 2025-03-01 LAB — STAPHYLOCOCCUS SPEC CULT: NORMAL

## 2025-03-03 DIAGNOSIS — R41.89 COGNITIVE DECLINE: Primary | ICD-10-CM

## 2025-03-06 ENCOUNTER — ANESTHESIA (OUTPATIENT)
Dept: OPERATING ROOM | Facility: HOSPITAL | Age: 75
End: 2025-03-06
Payer: MEDICARE

## 2025-03-06 ENCOUNTER — ANESTHESIA EVENT (OUTPATIENT)
Dept: OPERATING ROOM | Facility: HOSPITAL | Age: 75
End: 2025-03-06
Payer: MEDICARE

## 2025-03-06 ENCOUNTER — HOSPITAL ENCOUNTER (OUTPATIENT)
Facility: HOSPITAL | Age: 75
Setting detail: OUTPATIENT SURGERY
Discharge: HOME | End: 2025-03-06
Attending: NEUROLOGICAL SURGERY | Admitting: NEUROLOGICAL SURGERY
Payer: MEDICARE

## 2025-03-06 VITALS
DIASTOLIC BLOOD PRESSURE: 82 MMHG | SYSTOLIC BLOOD PRESSURE: 130 MMHG | OXYGEN SATURATION: 96 % | BODY MASS INDEX: 28.88 KG/M2 | TEMPERATURE: 96.8 F | HEIGHT: 72 IN | HEART RATE: 78 BPM | WEIGHT: 213.19 LBS | RESPIRATION RATE: 16 BRPM

## 2025-03-06 DIAGNOSIS — Z45.42 END OF BATTERY LIFE OF DEEP BRAIN STIMULATOR: ICD-10-CM

## 2025-03-06 DIAGNOSIS — Z98.890 POST-OPERATIVE STATE: ICD-10-CM

## 2025-03-06 DIAGNOSIS — G20.B2 PARKINSON'S DISEASE WITH DYSKINESIA AND FLUCTUATING MANIFESTATIONS: Primary | ICD-10-CM

## 2025-03-06 DIAGNOSIS — Z96.89 STATUS POST DEEP BRAIN STIMULATOR PLACEMENT: ICD-10-CM

## 2025-03-06 LAB
ABO GROUP (TYPE) IN BLOOD: NORMAL
ANTIBODY SCREEN: NORMAL
RH FACTOR (ANTIGEN D): NORMAL

## 2025-03-06 PROCEDURE — 2500000004 HC RX 250 GENERAL PHARMACY W/ HCPCS (ALT 636 FOR OP/ED): Performed by: NURSE PRACTITIONER

## 2025-03-06 PROCEDURE — 3700000002 HC GENERAL ANESTHESIA TIME - EACH INCREMENTAL 1 MINUTE: Performed by: NEUROLOGICAL SURGERY

## 2025-03-06 PROCEDURE — 7100000001 HC RECOVERY ROOM TIME - INITIAL BASE CHARGE: Performed by: NEUROLOGICAL SURGERY

## 2025-03-06 PROCEDURE — A4649 SURGICAL SUPPLIES: HCPCS | Performed by: NEUROLOGICAL SURGERY

## 2025-03-06 PROCEDURE — 2500000005 HC RX 250 GENERAL PHARMACY W/O HCPCS: Performed by: ANESTHESIOLOGY

## 2025-03-06 PROCEDURE — 2500000005 HC RX 250 GENERAL PHARMACY W/O HCPCS: Performed by: NURSE PRACTITIONER

## 2025-03-06 PROCEDURE — 7100000010 HC PHASE TWO TIME - EACH INCREMENTAL 1 MINUTE: Performed by: NEUROLOGICAL SURGERY

## 2025-03-06 PROCEDURE — 7100000009 HC PHASE TWO TIME - INITIAL BASE CHARGE: Performed by: NEUROLOGICAL SURGERY

## 2025-03-06 PROCEDURE — 3600000008 HC OR TIME - EACH INCREMENTAL 1 MINUTE - PROCEDURE LEVEL THREE: Performed by: NEUROLOGICAL SURGERY

## 2025-03-06 PROCEDURE — 2500000005 HC RX 250 GENERAL PHARMACY W/O HCPCS: Performed by: NEUROLOGICAL SURGERY

## 2025-03-06 PROCEDURE — C1787 PATIENT PROGR, NEUROSTIM: HCPCS | Performed by: NEUROLOGICAL SURGERY

## 2025-03-06 PROCEDURE — 2500000004 HC RX 250 GENERAL PHARMACY W/ HCPCS (ALT 636 FOR OP/ED): Performed by: NURSE ANESTHETIST, CERTIFIED REGISTERED

## 2025-03-06 PROCEDURE — 86900 BLOOD TYPING SEROLOGIC ABO: CPT | Performed by: NEUROLOGICAL SURGERY

## 2025-03-06 PROCEDURE — 7100000002 HC RECOVERY ROOM TIME - EACH INCREMENTAL 1 MINUTE: Performed by: NEUROLOGICAL SURGERY

## 2025-03-06 PROCEDURE — 86901 BLOOD TYPING SEROLOGIC RH(D): CPT | Performed by: NEUROLOGICAL SURGERY

## 2025-03-06 PROCEDURE — 2720000007 HC OR 272 NO HCPCS: Performed by: NEUROLOGICAL SURGERY

## 2025-03-06 PROCEDURE — C1820 GENERATOR NEURO RECHG BAT SY: HCPCS | Performed by: NEUROLOGICAL SURGERY

## 2025-03-06 PROCEDURE — 2780000003 HC OR 278 NO HCPCS: Performed by: NEUROLOGICAL SURGERY

## 2025-03-06 PROCEDURE — 3700000001 HC GENERAL ANESTHESIA TIME - INITIAL BASE CHARGE: Performed by: NEUROLOGICAL SURGERY

## 2025-03-06 PROCEDURE — 2500000004 HC RX 250 GENERAL PHARMACY W/ HCPCS (ALT 636 FOR OP/ED): Performed by: NEUROLOGICAL SURGERY

## 2025-03-06 PROCEDURE — 36415 COLL VENOUS BLD VENIPUNCTURE: CPT | Performed by: NEUROLOGICAL SURGERY

## 2025-03-06 PROCEDURE — 3600000003 HC OR TIME - INITIAL BASE CHARGE - PROCEDURE LEVEL THREE: Performed by: NEUROLOGICAL SURGERY

## 2025-03-06 PROCEDURE — 2500000004 HC RX 250 GENERAL PHARMACY W/ HCPCS (ALT 636 FOR OP/ED): Performed by: ANESTHESIOLOGY

## 2025-03-06 DEVICE — IMPLANTABLE DEVICE: Type: IMPLANTABLE DEVICE | Site: CHEST | Status: FUNCTIONAL

## 2025-03-06 RX ORDER — PROPOFOL 10 MG/ML
INJECTION, EMULSION INTRAVENOUS AS NEEDED
Status: DISCONTINUED | OUTPATIENT
Start: 2025-03-06 | End: 2025-03-06

## 2025-03-06 RX ORDER — ACETAMINOPHEN 325 MG/1
650 TABLET ORAL EVERY 4 HOURS PRN
Status: DISCONTINUED | OUTPATIENT
Start: 2025-03-06 | End: 2025-03-06 | Stop reason: HOSPADM

## 2025-03-06 RX ORDER — AMOXICILLIN 250 MG
1 CAPSULE ORAL DAILY
Qty: 20 TABLET | Refills: 0 | Status: SHIPPED | OUTPATIENT
Start: 2025-03-06

## 2025-03-06 RX ORDER — OXYCODONE HYDROCHLORIDE 5 MG/1
5 TABLET ORAL EVERY 6 HOURS PRN
Qty: 21 TABLET | Refills: 0 | Status: SHIPPED | OUTPATIENT
Start: 2025-03-06

## 2025-03-06 RX ORDER — OXYCODONE HYDROCHLORIDE 5 MG/1
5 TABLET ORAL EVERY 4 HOURS PRN
Status: DISCONTINUED | OUTPATIENT
Start: 2025-03-06 | End: 2025-03-06 | Stop reason: HOSPADM

## 2025-03-06 RX ORDER — MIDAZOLAM HYDROCHLORIDE 1 MG/ML
INJECTION INTRAMUSCULAR; INTRAVENOUS AS NEEDED
Status: DISCONTINUED | OUTPATIENT
Start: 2025-03-06 | End: 2025-03-06

## 2025-03-06 RX ORDER — ONDANSETRON HYDROCHLORIDE 2 MG/ML
INJECTION, SOLUTION INTRAVENOUS AS NEEDED
Status: DISCONTINUED | OUTPATIENT
Start: 2025-03-06 | End: 2025-03-06

## 2025-03-06 RX ORDER — CEFAZOLIN 1 G/1
INJECTION, POWDER, FOR SOLUTION INTRAVENOUS AS NEEDED
Status: DISCONTINUED | OUTPATIENT
Start: 2025-03-06 | End: 2025-03-06

## 2025-03-06 RX ORDER — ALBUTEROL SULFATE 0.83 MG/ML
2.5 SOLUTION RESPIRATORY (INHALATION) ONCE AS NEEDED
Status: DISCONTINUED | OUTPATIENT
Start: 2025-03-06 | End: 2025-03-06 | Stop reason: HOSPADM

## 2025-03-06 RX ORDER — LIDOCAINE HYDROCHLORIDE 20 MG/ML
INJECTION, SOLUTION EPIDURAL; INFILTRATION; INTRACAUDAL; PERINEURAL AS NEEDED
Status: DISCONTINUED | OUTPATIENT
Start: 2025-03-06 | End: 2025-03-06

## 2025-03-06 RX ORDER — ROCURONIUM BROMIDE 10 MG/ML
INJECTION, SOLUTION INTRAVENOUS AS NEEDED
Status: DISCONTINUED | OUTPATIENT
Start: 2025-03-06 | End: 2025-03-06

## 2025-03-06 RX ORDER — PHENYLEPHRINE HCL IN 0.9% NACL 0.4MG/10ML
SYRINGE (ML) INTRAVENOUS AS NEEDED
Status: DISCONTINUED | OUTPATIENT
Start: 2025-03-06 | End: 2025-03-06

## 2025-03-06 RX ORDER — SODIUM CHLORIDE, SODIUM LACTATE, POTASSIUM CHLORIDE, CALCIUM CHLORIDE 600; 310; 30; 20 MG/100ML; MG/100ML; MG/100ML; MG/100ML
100 INJECTION, SOLUTION INTRAVENOUS CONTINUOUS
Status: DISCONTINUED | OUTPATIENT
Start: 2025-03-06 | End: 2025-03-06 | Stop reason: HOSPADM

## 2025-03-06 RX ORDER — IPRATROPIUM BROMIDE 0.5 MG/2.5ML
500 SOLUTION RESPIRATORY (INHALATION) ONCE
Status: DISCONTINUED | OUTPATIENT
Start: 2025-03-06 | End: 2025-03-06 | Stop reason: HOSPADM

## 2025-03-06 RX ORDER — POLYETHYLENE GLYCOL 3350 17 G/17G
17 POWDER, FOR SOLUTION ORAL DAILY
Qty: 20 EACH | Refills: 0 | Status: SHIPPED | OUTPATIENT
Start: 2025-03-06

## 2025-03-06 RX ORDER — CEFAZOLIN SODIUM 2 G/100ML
2 INJECTION, SOLUTION INTRAVENOUS ONCE
Status: DISCONTINUED | OUTPATIENT
Start: 2025-03-06 | End: 2025-03-06 | Stop reason: HOSPADM

## 2025-03-06 RX ORDER — DROPERIDOL 2.5 MG/ML
0.62 INJECTION, SOLUTION INTRAMUSCULAR; INTRAVENOUS ONCE AS NEEDED
Status: DISCONTINUED | OUTPATIENT
Start: 2025-03-06 | End: 2025-03-06 | Stop reason: HOSPADM

## 2025-03-06 RX ORDER — FENTANYL CITRATE 50 UG/ML
INJECTION, SOLUTION INTRAMUSCULAR; INTRAVENOUS AS NEEDED
Status: DISCONTINUED | OUTPATIENT
Start: 2025-03-06 | End: 2025-03-06

## 2025-03-06 RX ORDER — LIDOCAINE HYDROCHLORIDE 10 MG/ML
0.1 INJECTION, SOLUTION EPIDURAL; INFILTRATION; INTRACAUDAL; PERINEURAL ONCE
Status: DISCONTINUED | OUTPATIENT
Start: 2025-03-06 | End: 2025-03-06 | Stop reason: HOSPADM

## 2025-03-06 RX ORDER — ONDANSETRON HYDROCHLORIDE 2 MG/ML
4 INJECTION, SOLUTION INTRAVENOUS ONCE AS NEEDED
Status: COMPLETED | OUTPATIENT
Start: 2025-03-06 | End: 2025-03-06

## 2025-03-06 RX ADMIN — ONDANSETRON 4 MG: 2 INJECTION INTRAMUSCULAR; INTRAVENOUS at 14:55

## 2025-03-06 RX ADMIN — ONDANSETRON 4 MG: 2 INJECTION, SOLUTION INTRAMUSCULAR; INTRAVENOUS at 17:16

## 2025-03-06 RX ADMIN — Medication 200 MCG: at 14:45

## 2025-03-06 RX ADMIN — Medication 200 MCG: at 15:14

## 2025-03-06 RX ADMIN — CEFAZOLIN 2 G: 330 INJECTION, POWDER, FOR SOLUTION INTRAMUSCULAR; INTRAVENOUS at 15:02

## 2025-03-06 RX ADMIN — FENTANYL CITRATE 50 MCG: 50 INJECTION, SOLUTION INTRAMUSCULAR; INTRAVENOUS at 16:02

## 2025-03-06 RX ADMIN — POVIDONE-IODINE 1 APPLICATION: 5 SOLUTION TOPICAL at 12:47

## 2025-03-06 RX ADMIN — MIDAZOLAM HYDROCHLORIDE 2 MG: 1 INJECTION, SOLUTION INTRAMUSCULAR; INTRAVENOUS at 14:34

## 2025-03-06 RX ADMIN — FENTANYL CITRATE 100 MCG: 50 INJECTION, SOLUTION INTRAMUSCULAR; INTRAVENOUS at 14:45

## 2025-03-06 RX ADMIN — Medication 3 L/MIN: at 17:15

## 2025-03-06 RX ADMIN — SODIUM CHLORIDE, POTASSIUM CHLORIDE, SODIUM LACTATE AND CALCIUM CHLORIDE: 600; 310; 30; 20 INJECTION, SOLUTION INTRAVENOUS at 14:36

## 2025-03-06 RX ADMIN — Medication 200 MCG: at 14:55

## 2025-03-06 RX ADMIN — LIDOCAINE HYDROCHLORIDE 100 MG: 20 INJECTION, SOLUTION EPIDURAL; INFILTRATION; INTRACAUDAL; PERINEURAL at 14:45

## 2025-03-06 RX ADMIN — Medication 200 MCG: at 15:35

## 2025-03-06 RX ADMIN — DEXAMETHASONE SODIUM PHOSPHATE 8 MG: 4 INJECTION, SOLUTION INTRA-ARTICULAR; INTRALESIONAL; INTRAMUSCULAR; INTRAVENOUS; SOFT TISSUE at 14:55

## 2025-03-06 RX ADMIN — ROCURONIUM BROMIDE 80 MG: 10 INJECTION, SOLUTION INTRAVENOUS at 14:45

## 2025-03-06 RX ADMIN — Medication 200 MCG: at 15:03

## 2025-03-06 RX ADMIN — VANCOMYCIN HYDROCHLORIDE 1500 MG: 5 INJECTION, POWDER, LYOPHILIZED, FOR SOLUTION INTRAVENOUS at 14:16

## 2025-03-06 RX ADMIN — SUGAMMADEX 200 MG: 100 INJECTION, SOLUTION INTRAVENOUS at 16:42

## 2025-03-06 RX ADMIN — PROPOFOL 200 MG: 10 INJECTION, EMULSION INTRAVENOUS at 14:45

## 2025-03-06 RX ADMIN — FENTANYL CITRATE 50 MCG: 50 INJECTION, SOLUTION INTRAMUSCULAR; INTRAVENOUS at 15:25

## 2025-03-06 ASSESSMENT — COLUMBIA-SUICIDE SEVERITY RATING SCALE - C-SSRS
1. IN THE PAST MONTH, HAVE YOU WISHED YOU WERE DEAD OR WISHED YOU COULD GO TO SLEEP AND NOT WAKE UP?: NO
2. HAVE YOU ACTUALLY HAD ANY THOUGHTS OF KILLING YOURSELF?: NO
6. HAVE YOU EVER DONE ANYTHING, STARTED TO DO ANYTHING, OR PREPARED TO DO ANYTHING TO END YOUR LIFE?: NO

## 2025-03-06 ASSESSMENT — PAIN SCALES - GENERAL
PAINLEVEL_OUTOF10: 0 - NO PAIN

## 2025-03-06 ASSESSMENT — PAIN - FUNCTIONAL ASSESSMENT
PAIN_FUNCTIONAL_ASSESSMENT: 0-10

## 2025-03-06 NOTE — ANESTHESIA POSTPROCEDURE EVALUATION
Patient: Thomas Frankel    Procedure Summary       Date: 03/06/25 Room / Location: [facility] / Virtual [facility]    Anesthesia Start: 1436 Anesthesia Stop: 1702    Procedure: Bilateral Deep Brain Stimulator Generator Replacements (Bilateral) Diagnosis:       Parkinson's disease with dyskinesia and fluctuating manifestations      Status post deep brain stimulator placement      End of battery life of deep brain stimulator      (Parkinson's disease with dyskinesia and fluctuating manifestations [G20.B2])      (Status post deep brain stimulator placement [Z96.89])      (End of battery life of deep brain stimulator [Z45.42])    Surgeons: Veronica Davila MD Responsible Provider: Bright Nolan MD    Anesthesia Type: general ASA Status: 3            Anesthesia Type: general    Vitals Value Taken Time   /90 03/06/25 1700   Temp 36.2 °C (97.2 °F) 03/06/25 1700   Pulse 87 03/06/25 1700   Resp 16 03/06/25 1700   SpO2 97 % 03/06/25 1700       Anesthesia Post Evaluation    Patient location during evaluation: bedside  Patient participation: complete - patient participated  Level of consciousness: awake  Pain management: adequate  Airway patency: patent  Cardiovascular status: acceptable  Respiratory status: acceptable  Hydration status: acceptable  Postoperative Nausea and Vomiting: none        There were no known notable events for this encounter.

## 2025-03-06 NOTE — DISCHARGE SUMMARY
Discharge Diagnosis  DBS replacement    Issues Requiring Follow-Up  Follow up with neurosurgery     Test Results Pending At Discharge  Pending Labs       No current pending labs.            Hospital Course  4 y.o. right-handed male with PMH of HTN, HLD, GERD, IBS, and alcohol use disorder, with PD diagnosed at age 41, with symptoms severe tremors, s/p awake b/l STN DBS w/ MDT in 9/2006 by Dr. Laura Simms at [facility]; also, with cognitive decline. Patient transitioned his care to  neurology, Dr. Sutton, in 2023. He has undergone bilateral DBS generator replacement approximately every 1.5-2 years, and last underwent b/l DBS generator replacement on 5/9/23 by Dr. Herve Pelayo, complicated by left incision leakage and swelling, requiring left chest wound revision and washout on 7/27/23,     3/6 s/p b/l DBS generator replacement     Pertinent Physical Exam At Time of Discharge  Physical Exam  Aox3  Mild left hand resting tremor and bilateral kinetic tremor, L>R. Mild rigidity, UE>LE and L>R.   Incision c/d/i    Home Medications     Medication List      START taking these medications     oxyCODONE 5 mg immediate release tablet; Commonly known as: Roxicodone;   Take 1 tablet (5 mg) by mouth every 6 hours if needed for severe pain (7 -   10).   polyethylene glycol 17 gram packet; Commonly known as: Glycolax,   Miralax; Take 17 g by mouth once daily.; Replaces: polyethylene glycol 17   gram/dose powder     CHANGE how you take these medications     sennosides-docusate sodium 8.6-50 mg tablet; Commonly known as:   Carmela-Colace; Take 1 tablet by mouth once daily.; What changed: how much to   take, when to take this     CONTINUE taking these medications     acetaminophen 325 mg tablet; Commonly known as: Tylenol   atorvastatin 20 mg tablet; Commonly known as: Lipitor; Take 1 tablet (20   mg) by mouth once daily.   b complex vitamins capsule   carbidopa-levodopa-entacapone 37.5-150-200 mg tablet; Commonly known as:   Stalevo; Take  one tablet 5 times a day   cholecalciferol 50 MCG (2000 UT) tablet; Commonly known as: Vitamin D-3   donepezil 10 mg tablet; Commonly known as: Aricept   fludrocortisone 0.1 mg tablet; Commonly known as: Florinef; Take 1   tablet (0.1 mg) by mouth once daily in the morning. Take before meals.   MAGNESIUM CITRATE ORAL   melatonin 5 mg capsule   Myrbetriq 25 mg tablet extended release 24 hr 24 hr tablet; Generic   drug: mirabegron     STOP taking these medications     chlorhexidine 0.12 % solution; Commonly known as: Peridex   polyethylene glycol 17 gram/dose powder; Commonly known as: Glycolax,   Miralax; Replaced by: polyethylene glycol 17 gram packet     ASK your doctor about these medications     cyanocobalamin 1,000 mcg tablet; Commonly known as: Vitamin B-12       Outpatient Follow-Up  Future Appointments   Date Time Provider Department Center   3/11/2025  2:00 PM CHARMAINE Thomas-CNP [id number] [facility]   3/21/2025  1:00 PM Veronica Davila MD [id number] [facility]   4/4/2025  1:30 PM Veronica Davila MD [id number] [facility]   4/16/2025  2:20 PM Georges Glass MD [id number] [facility]   4/21/2025 11:00 AM CHARMAINE [facility] [id number] [facility]   7/22/2025  1:00 PM Sumit Toscano MD [id number] [facility]       Charley Bingham MD

## 2025-03-06 NOTE — ANESTHESIA PREPROCEDURE EVALUATION
Patient: Thomas Frankel    Procedure Information       Date/Time: 03/06/25 1400    Procedure: Bilateral Deep Brain Stimulator Generator Replacements (Bilateral)    Location: [facility] OR 06 / [facility] OR    Surgeons: Veronica Davila MD            Relevant Problems   Cardiac   (+) Hypertension   (+) Mixed hyperlipidemia      GI   (+) GERD (gastroesophageal reflux disease)      Musculoskeletal   (+) Degeneration of lumbar or lumbosacral intervertebral disc   (+) Spinal stenosis, lumbar region, without neurogenic claudication       Clinical information reviewed:   Tobacco  Allergies  Meds   Med Hx  Surg Hx   Fam Hx  Soc Hx        NPO Detail:  NPO/Void Status  Carbohydrate Drink Given Prior to Surgery? : N  Date of Last Liquid: 03/06/25  Time of Last Liquid: 0800  Date of Last Solid: 03/05/25  Time of Last Solid: 1800  Last Intake Type: Clear fluids  Time of Last Void: 1239         Physical Exam    Airway  Mallampati: III  TM distance: >3 FB  Neck ROM: limited     Cardiovascular    Dental    Pulmonary    Abdominal        Anesthesia Plan    History of general anesthesia?: yes  History of complications of general anesthesia?: no    ASA 3     general   (PT's meds due at 15:00, allowed to take them with a sip of water)  intravenous induction   Anesthetic plan and risks discussed with patient.    Plan discussed with CRNA.

## 2025-03-06 NOTE — ANESTHESIA PROCEDURE NOTES
Airway  Date/Time: 3/6/2025 2:49 PM  Urgency: elective      Staffing  Performed: CRNA   Authorized by: Rodney Atkinson MD    Performed by: CHARMAINE Lantigua-RK  Patient location during procedure: OR    Indications and Patient Condition  Indications for airway management: anesthesia and airway protection  Sedation level: deep  Preoxygenated: yes  Patient position: sniffing    Planned trial extubation    Final Airway Details  Final airway type: endotracheal airway      Successful airway: ETT  Cuffed: yes   Successful intubation technique: video laryngoscopy  Facilitating devices/methods: intubating stylet  Blade: Skip  Blade size: #4  ETT size (mm): 7.5  Cormack-Lehane Classification: grade I - full view of glottis  Placement verified by: chest auscultation   Measured from: lips  Number of attempts at approach: 1

## 2025-03-06 NOTE — HOSPITAL COURSE
4 y.o. right-handed male with PMH of HTN, HLD, GERD, IBS, and alcohol use disorder, with PD diagnosed at age 41, with symptoms severe tremors, s/p awake b/l STN DBS w/ MDT in 9/2006 by Dr. Laura Simms at [facility]; also, with cognitive decline. Patient transitioned his care to  neurology, Dr. Sutton, in 2023. He has undergone bilateral DBS generator replacement approximately every 1.5-2 years, and last underwent b/l DBS generator replacement on 5/9/23 by Dr. Herve Pelayo, complicated by left incision leakage and swelling, requiring left chest wound revision and washout on 7/27/23,     3/6 s/p b/l DBS generator replacement

## 2025-03-06 NOTE — OP NOTE
Bilateral Deep Brain Stimulator Generator Replacements (B) Operative Note     Date: 3/6/2025  OR Location: [facility] OR    Name: Thomas Frankel, : 1950, Age: 74 y.o., MRN: [id number], Sex: male    Diagnosis  Pre-op Diagnosis      * Parkinson's disease with dyskinesia and fluctuating manifestations [G20.B2]     * Status post deep brain stimulator placement [Z96.89]     * End of battery life of deep brain stimulator [Z45.42] Post-op Diagnosis     * Parkinson's disease with dyskinesia and fluctuating manifestations [G20.B2]     * Status post deep brain stimulator placement [Z96.89]     * End of battery life of deep brain stimulator [Z45.42]     Procedures  Bilateral Deep Brain Stimulator Generator Replacements  62739 - [place] INSJ/RPLCMT CRANIAL NEUROSTIM PULSE GENERATOR    Bilateral Deep Brain Stimulator Generator Replacements  55677 - [place] INSJ/RPLCMT CRANIAL NEUROSTIM PULSE GENERATOR      Surgeons      * Veronica Davila - Primary    Resident/Fellow/Other Assistant:  Surgeons and Role:     * Charley Bingham MD - Resident - Assisting    Staff:   Circulator: Tatiana  Scrub Person: Morelia Hull Scrub: Shala Hull Circulator: Ella    Anesthesia Staff: Anesthesiologist: Bright Nolan MD; Rodney Atkinson MD; Winston Greenwood MD  CRNA: CHARMAINE Lantigua-CRNA    Procedure Summary  Anesthesia: General  ASA: III  Estimated Blood Loss: 10mL  Intra-op Medications:   Administrations occurring from 1400 to 1530 on 25:   Medication Name Total Dose   bupivacaine PF 0.25 % (Marcaine) 0.25 % (2.5 mg/mL) 30 mL, lidocaine-epinephrine (Xylocaine W/EPI) 1 %-1:100,000 20 mL syringe 26 mL   ceFAZolin (Ancef) 1,000 mg, polymyxin B 500,000 Units in sodium chloride 0.9 % 1,000 mL irrigation 1,000 mL   ceFAZolin (Ancef) vial 1 g 2 g   dexAMETHasone (Decadron) injection 4 mg/mL 8 mg   fentaNYL (Sublimaze) injection 50 mcg/mL 150 mcg   LR bolus Cannot be calculated   lidocaine PF (Xylocaine-MPF) local injection 2 % 100 mg   midazolam PF  (Versed) injection 1 mg/mL 2 mg   ondansetron (Zofran) 2 mg/mL injection 4 mg   phenylephrine 40 mcg/mL syringe 10 mL 800 mcg   propofol (Diprivan) injection 10 mg/mL 200 mg   rocuronium (ZeMuron) 50 mg/5 mL injection 80 mg   vancomycin (Vancocin) 1,500 mg in dextrose 5% 500 mL IV Cannot be calculated              Anesthesia Record               Intraprocedure I/O Totals          Intake    LR bolus 1000.00 mL    Total Intake 1000 mL          Specimen: No specimens collected              Drains and/or Catheters: * None in log *    Tourniquet Times:         Implants:  Implants       Type Name Action Serial No.      Implant SENSIGHT CONNECTOR PLUG Implanted N/A     Implant POCKET ADAPTOR FOR DEEP BRAIN STIMULATION 1X4 Implanted N/A     Implant POCKET ADAPTOR FOR DEEP BRAIN STIMULATION 1X4 Implanted N/A     Implant PERCEPT RC - RIGHT GENERATOR Implanted [id number]     Implant PERCEPT RC - GENERATOR Implanted [id number]              Findings: bilateral DBS generator replacement with good impedance at the end of the case     Indications: Thomas Frankel is an 74 y.o. male who is having surgery for Parkinson's disease with dyskinesia and fluctuating manifestations [G20.B2]  Status post deep brain stimulator placement [Z96.89]  End of battery life of deep brain stimulator [Z45.42].     The patient was seen in the preoperative area. The risks, benefits, complications, treatment options, non-operative alternatives, expected recovery and outcomes were discussed with the patient. The possibilities of reaction to medication, pulmonary aspiration, injury to surrounding structures, bleeding, recurrent infection, the need for additional procedures, failure to diagnose a condition, and creating a complication requiring transfusion or operation were discussed with the patient. The patient concurred with the proposed plan, giving informed consent.  The site of surgery was properly noted/marked if necessary per policy. The patient has  been actively warmed in preoperative area. Preoperative antibiotics have been ordered and given within 1 hours of incision. Venous thrombosis prophylaxis have been ordered including bilateral sequential compression devices    Procedure Details:   The patient was taken to the operating room and placed supine on operating room table. Bilateral sided infraclavicular incision was marked prepped and draped   in the usual sterile manner.  This incision was then opened using a #10 blade, followed by plasma blade and blunt dissection to locate the generator.  The generator was then removed  from the pocket.  The leads were disconnected.  The new generators ([employer]) was then attached and secured in the pocket with 2-0 silk sutures.  Impedances were   checked at this point and found to be within acceptable range. Copious irrigation was used to irrigate the pocket. Hemostasis was achieved.  Subcutaneous layers were closed with 2-0 Vicryl and subcuticular layer was closed with a 4-0 Monocryl, followed by a layer of liquid adhesive.  No complications were noted during the procedure.  The patient tolerated the procedure well and was transported to the PACU in stable condition.     Complications:  None; patient tolerated the procedure well.    Disposition: PACU - hemodynamically stable.  Condition: stable                 Additional Details:     Attending Attestation:     Veronica Davila  Phone Number: [phone]

## 2025-03-07 ASSESSMENT — PAIN SCALES - GENERAL: PAINLEVEL_OUTOF10: 2

## 2025-03-11 ENCOUNTER — APPOINTMENT (OUTPATIENT)
Dept: OTOLARYNGOLOGY | Facility: CLINIC | Age: 75
End: 2025-03-11
Payer: MEDICARE

## 2025-03-11 VITALS — HEIGHT: 72 IN | WEIGHT: 213.5 LBS | BODY MASS INDEX: 28.92 KG/M2

## 2025-03-11 DIAGNOSIS — R49.8 HYPOPHONIA: ICD-10-CM

## 2025-03-11 DIAGNOSIS — K21.9 LPRD (LARYNGOPHARYNGEAL REFLUX DISEASE): ICD-10-CM

## 2025-03-11 DIAGNOSIS — G20.A2 PARKINSON'S DISEASE WITHOUT DYSKINESIA, WITH FLUCTUATING MANIFESTATIONS: Primary | ICD-10-CM

## 2025-03-11 DIAGNOSIS — R13.10 DYSPHAGIA, UNSPECIFIED TYPE: ICD-10-CM

## 2025-03-11 PROCEDURE — 1036F TOBACCO NON-USER: CPT

## 2025-03-11 PROCEDURE — 99203 OFFICE O/P NEW LOW 30 MIN: CPT

## 2025-03-11 PROCEDURE — 1160F RVW MEDS BY RX/DR IN RCRD: CPT

## 2025-03-11 PROCEDURE — 31575 DIAGNOSTIC LARYNGOSCOPY: CPT

## 2025-03-11 PROCEDURE — 1159F MED LIST DOCD IN RCRD: CPT

## 2025-03-11 RX ORDER — OMEPRAZOLE 40 MG/1
40 CAPSULE, DELAYED RELEASE ORAL
Qty: 30 CAPSULE | Refills: 1 | Status: SHIPPED | OUTPATIENT
Start: 2025-03-11 | End: 2025-04-10

## 2025-03-11 ASSESSMENT — ENCOUNTER SYMPTOMS
MEMORY LOSS: 1
NEUROLOGIC COMPLAINT: 1
LOSS OF BALANCE: 1

## 2025-03-11 ASSESSMENT — PATIENT HEALTH QUESTIONNAIRE - PHQ9
10. IF YOU CHECKED OFF ANY PROBLEMS, HOW DIFFICULT HAVE THESE PROBLEMS MADE IT FOR YOU TO DO YOUR WORK, TAKE CARE OF THINGS AT HOME, OR GET ALONG WITH OTHER PEOPLE: NOT DIFFICULT AT ALL
SUM OF ALL RESPONSES TO PHQ9 QUESTIONS 1 AND 2: 2
2. FEELING DOWN, DEPRESSED OR HOPELESS: SEVERAL DAYS
1. LITTLE INTEREST OR PLEASURE IN DOING THINGS: SEVERAL DAYS

## 2025-03-11 NOTE — LETTER
March 13, 2025     CHARMAINE DrakeCNP  [address]  Department Of Neurology  [facility] [address]    Patient: Thomas Frankel   YOB: 1950   Date of Visit: 3/11/2025       Dear CHARMAINE Burton-CNP:    Thank you for referring Thomas Frankel to me for evaluation. Below are my notes for this consultation.  If you have questions, please do not hesitate to call me. I look forward to following your patient along with you.       Sincerely,     CHARMAINE Thomas-CNP      CC: No Recipients  ______________________________________________________________________________________    Reason For Consult  dysphagia       HISTORY OF PRESENT ILLNESS:  This is a request for consultation from Kam MONTANO-CNP for Thomas Frankel, who is a 74 y.o. male presenting for an initial visit for dysphagia and hypophonia with a history of parkinson's disease.  The patient reports difficulty and increased effort when swallowing all consistencies. Patient had MBSS performed on 2/14/25. Patient reports frequent throat clearing and cough. Patient reports severity of cough worse at night, but cough is present variably throughout the day. Patient's POA reports an increase in soft speaking/hypophonia. No smoking history.       Past Medical History  He has a past medical history of Alcohol use disorder, moderate, dependence (Multi) (10/23/2020), Anxiety, Cognitive decline, Dysphagia (01/09/2025), Gait disorder, GERD (gastroesophageal reflux disease), Hearing aid worn, History of hepatitis (1969), Incontinence of urine, Major depressive disorder, recurrent, unspecified (10/23/2020), Parkinson disease (Multi), and Spinal stenosis.  Surgical History  He has a past surgical history that includes Tonsillectomy; Rib fracture surgery (07/26/2022); Deep brain stimulator placement (2006); Ankle fracture surgery (Left, 2020); Laminectomy (1999); Lumbar laminectomy (2000); [place] tooth extraction; Cataract  extraction, bilateral; Deep brain stimulator placement; Insertion / removal cranial DBS generator (2023); and Wound debridement (2023).     Social History  He reports that he has never smoked. He has never used smokeless tobacco. He reports current alcohol use of about 4.0 standard drinks of alcohol per week. He reports that he does not use drugs.    Allergies  Patient has no known allergies.    Review of Systems  All 10 systems were reviewed and negative except for above.      Physical Exam  CONSTITUTIONAL: Well developed, well nourished.    VOICE: variable hypophonia  RESPIRATION: Breathing comfortably, no stridor.    NEURO: Alert and oriented x3, cranial nerves II-XII intact and symmetric bilaterally.    EARS: Normal external ears, external auditory canals, normal hearing to conversational voice.    NOSE: External nose midline, anterior rhinoscopy is normal with limited visualization to the anterior aspect of the interior turbinates. No lesions noted.     ORAL CAVITY/OROPHARYNX/LIPS: Normal mucous membranes, normal floor of mouth/tongue/OP, no masses or lesions are noted.    SKIN: Neck skin is intact  PSYCH: Alert and oriented with appropriate mood and affect.        Last Recorded Vitals  There were no vitals taken for this visit.    Procedure  PROCEDURE NOTE:  Recommended flexible laryngoscopy/stroboscopy.  Risks, benefits,  and alternatives were explained.  They wish to proceed and provide verbal consent.     PROCEDURE:  Flexible Laryngoscopy, CPT 69370    INDICATIONS: Inability to tolerate mirror exam or abnormal findings on mirror, Flexible Laryngoscopy/Stroboscopy performed to assess one of the followin. Diagnosis of symptomatic disorder involving the voice, swallow, upper aerodigestive tract, including FARA disorders, or  2. Preoperative evaluation of vocal cord function for individuals undergoing surgery where the RLN or vagus nerves are at risk of injury, or  3. Further evaluation of  abnormalities of the upper aerodigestive tract discovered by another modality, such as CT, MRI, bronchoscopy or EGD    Description of Procedure:    After adequate afrin and lidocaine spray, I advanced the endoscope.  Visualization of the nasopharynx, vallecula, posterior pharyngeal walls, pyriform, epiglottis and post cricoid areas was unremarkable.  The following laryngeal findings were noted:    vocal cord movement was normal  closure was complete  Compression was increased AP = FVC   edema was  mild bilateral vc  interarytenoid edema present  lesions were none  the subglottis was widely patent  No pooling of secretions.     Procedure well tolerated.       ASSESSMENT AND PLAN:   This is an initial visit for dysphagia, hypophonia, and LPR with clinical findings notable for normal vc movement with increased AP and FVC compression. No masses or lesions. IA edema present- will start omeprazole 40 mg daily in morning before breakfast. Referral made to Marybel lema for swallow therapy and variable hypophonia. Patient agreeable to plan. All questions answered. Can follow up in 2-3 months.     We discussed the treatment options to include, medical and surgical options.  We have decided to proceed as follows:   Will start Omeprazole 40 mg daily in morning for LPR, cough, clearing of throat   Referral placed to marybel lema SLP for swallow and speech therapy.   Patient can follow up in 2-3 months.

## 2025-03-11 NOTE — PROGRESS NOTES
Reason For Consult  dysphagia       HISTORY OF PRESENT ILLNESS:  This is a request for consultation from Kam MONTANOCNP for Thomas Frankel, who is a 74 y.o. male presenting for an initial visit for dysphagia and hypophonia with a history of parkinson's disease.  The patient reports difficulty and increased effort when swallowing all consistencies. Patient had MBSS performed on 2/14/25. Patient reports frequent throat clearing and cough. Patient reports severity of cough worse at night, but cough is present variably throughout the day. Patient's POA reports an increase in soft speaking/hypophonia. No smoking history.       Past Medical History  He has a past medical history of Alcohol use disorder, moderate, dependence (Multi) (10/23/2020), Anxiety, Cognitive decline, Dysphagia (01/09/2025), Gait disorder, GERD (gastroesophageal reflux disease), Hearing aid worn, History of hepatitis (1969), Incontinence of urine, Major depressive disorder, recurrent, unspecified (10/23/2020), Parkinson disease (Multi), and Spinal stenosis.  Surgical History  He has a past surgical history that includes Tonsillectomy; Rib fracture surgery (07/26/2022); Deep brain stimulator placement (2006); Ankle fracture surgery (Left, 2020); Laminectomy (1999); Lumbar laminectomy (2000); [place] tooth extraction; Cataract extraction, bilateral; Deep brain stimulator placement; Insertion / removal cranial DBS generator (05/09/2023); and Wound debridement (07/27/2023).     Social History  He reports that he has never smoked. He has never used smokeless tobacco. He reports current alcohol use of about 4.0 standard drinks of alcohol per week. He reports that he does not use drugs.    Allergies  Patient has no known allergies.    Review of Systems  All 10 systems were reviewed and negative except for above.      Physical Exam  CONSTITUTIONAL: Well developed, well nourished.    VOICE: variable hypophonia  RESPIRATION: Breathing comfortably,  no stridor.    NEURO: Alert and oriented x3, cranial nerves II-XII intact and symmetric bilaterally.    EARS: Normal external ears, external auditory canals, normal hearing to conversational voice.    NOSE: External nose midline, anterior rhinoscopy is normal with limited visualization to the anterior aspect of the interior turbinates. No lesions noted.     ORAL CAVITY/OROPHARYNX/LIPS: Normal mucous membranes, normal floor of mouth/tongue/OP, no masses or lesions are noted.    SKIN: Neck skin is intact  PSYCH: Alert and oriented with appropriate mood and affect.        Last Recorded Vitals  There were no vitals taken for this visit.    Procedure  PROCEDURE NOTE:  Recommended flexible laryngoscopy/stroboscopy.  Risks, benefits,  and alternatives were explained.  They wish to proceed and provide verbal consent.     PROCEDURE:  Flexible Laryngoscopy, CPT 97897    INDICATIONS: Inability to tolerate mirror exam or abnormal findings on mirror, Flexible Laryngoscopy/Stroboscopy performed to assess one of the followin. Diagnosis of symptomatic disorder involving the voice, swallow, upper aerodigestive tract, including FARA disorders, or  2. Preoperative evaluation of vocal cord function for individuals undergoing surgery where the RLN or vagus nerves are at risk of injury, or  3. Further evaluation of abnormalities of the upper aerodigestive tract discovered by another modality, such as CT, MRI, bronchoscopy or EGD    Description of Procedure:    After adequate afrin and lidocaine spray, I advanced the endoscope.  Visualization of the nasopharynx, vallecula, posterior pharyngeal walls, pyriform, epiglottis and post cricoid areas was unremarkable.  The following laryngeal findings were noted:    vocal cord movement was normal  closure was complete  Compression was increased AP = FVC   edema was  mild bilateral vc  interarytenoid edema present  lesions were none  the subglottis was widely patent  No pooling of secretions.      Procedure well tolerated.       ASSESSMENT AND PLAN:   This is an initial visit for dysphagia, hypophonia, and LPR with clinical findings notable for normal vc movement with increased AP and FVC compression. No masses or lesions. IA edema present- will start omeprazole 40 mg daily in morning before breakfast. Referral made to Marybel lema for swallow therapy and variable hypophonia. Patient agreeable to plan. All questions answered. Can follow up in 2-3 months.     We discussed the treatment options to include, medical and surgical options.  We have decided to proceed as follows:   Will start Omeprazole 40 mg daily in morning for LPR, cough, clearing of throat   Referral placed to marybel lema SLP for swallow and speech therapy.   Patient can follow up in 2-3 months.

## 2025-03-20 NOTE — PROGRESS NOTES
[facility]   Neurosurgery    Diagnosis  Kieran was seen today for post-op.  Diagnoses and all orders for this visit:  Parkinson's disease with dyskinesia and fluctuating manifestations  Status post deep brain stimulator placement      Patient Discussion/Summary  Today we discussed wound care and activity restrictions.  We discussed that there may be some mild changes in your therapy with the transition from your old Medtronic system to the new system, which may require some minor programming adjustments with neurology.  Follow up with Dr. Davila as scheduled for you postoperative visit, or sooner if issues arise.     Provider Impressions  74 y.o. right-handed male with PD diagnosed at age 41, with symptoms of severe tremors, s/p awake b/l STN DBS w/ MDT in 9/2006 by Dr. Laura Simms at [facility]; also, with cognitive decline. Patient transitioned his care to  neurology, Dr. Sutton, in 2023. He has undergone b/l DBS generator replacement approx every 1.5-2 years, last on 5/9/23 by Dr. Herve Pelayo, complicated L incisional leakage requiring washout. He is now s/p b/l DBS generator replacement with MDT Percept  on 3/6/25. Today he is doing overall well and incisions are healing well. He and his wife have noticed slight change in his therapy since surgery, which was discussed is likely attributed to the conversion in therapy from his old Activa system to the new Percept system (V to mA), and may require some minor programming adjustments. He is scheduled for a programming visit with Kam on 4/21. We discussed wound care and activity restrictions today. He is scheduled for his postop visit with Dr. Davila on 4/4, and is aware to contact us sooner, should issues arise.        History of Present Illness  Chief Complaint:   Chief Complaint   Patient presents with    Post-op         HPI: Thomas Frankel is a 74 y.o. right-handed male with PD diagnosed at age 41, with symptoms of severe tremors, s/p awake b/l STN DBS  w/ MDT in 9/2006 by Dr. Laura Simms at [facility]; also, with cognitive decline. Patient transitioned his care to  neurology, Dr. Sutton, in 2023. He has undergone bilateral DBS generator replacement approximately every 1.5-2 years, most recently b/l DBS generator replacement on 5/9/23 by Dr. Herve Pelayo, complicated by left incision leakage and swelling, requiring left chest wound revision and washout on 7/27/23; lacey purulence noted intraop, with negative wound and blood cultures. He was recently seen for programming by JERALD Humphrey, and was again found to be at MONISHA (L STN 2.55, R STN 2.46), in need of urgent replacement. He is now s/p b/l DBS generator replacement with EDMUNDO NUNES on 3/6/25. Patient presents today for his postop visit.    Patient presents today with his wife. Patient reports to be doing overall okay since surgery and denies issues with his incisions. However, he and his wife feel that there may be a slight change in his therapy with the new generators, possibly resulting in under treatment, which they are inquiring about. His wife also report some initial difficulties with charging the devices, though, they have been in contact with the Medtronic representatives and state that this is improving with time.     Patient case discussed with neurology, JERALD Humphrey, via telephone during visit and confirmed that there is a conversion from V to mA when switching from old Medtronic Activa system to new Percept system. As such, there may be a resultant mild change in therapy, which can require minor adjustments in programming. Patient is currently scheduled for follow up programming visit with Kam on 4/21, which they are happy with.       Interrogation from Today's Visit:  Left Chest/Left STN  Device: EDMUNDO Percept RC (implanted 3/6/25)  Battery: 90% (MONISHA 3/2/2040)  Impedances: All OK  C+3-2- 3.2mA (1.0-3.5)/90us/185Hz    Right Chest/Right STN  Device: MDRHIANNA Percept RC (implanted 3/6/25)  Battery: 90% (MONISHA  3/2/40)  Impedances: All OK  3-2+ 6.3mA (2.0-6.4)/90us/185Hz      ROS: As noted in HPI.      Previous History  Past Medical History:   Diagnosis Date    Alcohol use disorder, moderate, dependence (Multi) 10/23/2020    Anxiety     Cognitive decline     Dysphagia 01/09/2025    modified barrium swallow WNL    Gait disorder     GERD (gastroesophageal reflux disease)     improved, no meds    Hearing aid worn     History of hepatitis 1969    thinks it was Hep A    Incontinence of urine     Major depressive disorder, recurrent, unspecified 10/23/2020    Parkinson disease (Multi)     Spinal stenosis      Past Surgical History:   Procedure Laterality Date    ANKLE FRACTURE SURGERY Left 2020    w/ instrumentation    CATARACT EXTRACTION, BILATERAL      DEEP BRAIN STIMULATOR PLACEMENT  2006    DEEP BRAIN STIMULATOR PLACEMENT      battery changes Q 18 months    INSERTION / REMOVAL CRANIAL DBS GENERATOR  05/09/2023    generator change    LAMINECTOMY  1999    LUMBAR LAMINECTOMY  2000    revision    RIB FRACTURE SURGERY  07/26/2022    Rib fracture repair    TONSILLECTOMY      as a child    WISDOM TOOTH EXTRACTION      WOUND DEBRIDEMENT  07/27/2023    DBS incision     Social History     Tobacco Use    Smoking status: Never    Smokeless tobacco: Never   Vaping Use    Vaping status: Never Used   Substance Use Topics    Alcohol use: Yes     Alcohol/week: 4.0 standard drinks of alcohol     Types: 4 Shots of liquor per week    Drug use: Never     Family History   Problem Relation Name Age of Onset    Leukemia Mother Doris Frankel     Blood Disorder Mother Doris Frankel     Heart disease Father      Breast cancer Sister      Brain cancer Brother      Hypertension Brother      Colon cancer Neg Hx       No Known Allergies  Current Outpatient Medications   Medication Instructions    acetaminophen (Tylenol) 325 mg tablet Every 4 hours RT    atorvastatin (LIPITOR) 20 mg, oral, Daily    b complex vitamins capsule 1 capsule, Daily     carbidopa-levodopa-entacapone (Stalevo) 37.5-150-200 mg tablet Take one tablet 5 times a day    cholecalciferol (Vitamin D-3) 50 MCG (2000 UT) tablet 1 tablet, Daily    cyanocobalamin (Vitamin B-12) 1,000 mcg tablet 1 tablet, Daily    donepezil (ARICEPT) 10 mg, Nightly    fludrocortisone (FLORINEF) 0.1 mg, oral, Daily before breakfast    MAGNESIUM CITRATE ORAL 400 mg, Daily    melatonin 6 mg, Nightly    Myrbetriq 25 mg tablet extended release 24 hr 24 hr tablet 2 tablets, Daily    omeprazole (PRILOSEC) 40 mg, oral, Daily before breakfast, Do not crush or chew.    oxyCODONE (ROXICODONE) 5 mg, oral, Every 6 hours PRN    polyethylene glycol (GLYCOLAX, MIRALAX) 17 g, oral, Daily    sennosides-docusate sodium (Carmela-Colace) 8.6-50 mg tablet 1 tablet, oral, Daily         Vitals  /82   Pulse 92   Temp 36.7 °C (98 °F)   Resp 18   Ht 1.829 m (6')   Wt 90.7 kg (200 lb)   BMI 27.12 kg/m²       Physical Exam  Well appearing, in no acute distress. Bilateral chest incisions healing well with surgical glue, c/d/I.

## 2025-03-21 ENCOUNTER — OFFICE VISIT (OUTPATIENT)
Dept: NEUROSURGERY | Facility: CLINIC | Age: 75
End: 2025-03-21
Payer: MEDICARE

## 2025-03-21 ENCOUNTER — APPOINTMENT (OUTPATIENT)
Dept: NEUROSURGERY | Facility: CLINIC | Age: 75
End: 2025-03-21
Payer: MEDICARE

## 2025-03-21 VITALS
BODY MASS INDEX: 27.09 KG/M2 | HEART RATE: 92 BPM | DIASTOLIC BLOOD PRESSURE: 82 MMHG | SYSTOLIC BLOOD PRESSURE: 130 MMHG | TEMPERATURE: 98 F | RESPIRATION RATE: 18 BRPM | WEIGHT: 200 LBS | HEIGHT: 72 IN

## 2025-03-21 DIAGNOSIS — G20.B2 PARKINSON'S DISEASE WITH DYSKINESIA AND FLUCTUATING MANIFESTATIONS: Primary | ICD-10-CM

## 2025-03-21 DIAGNOSIS — Z96.89 STATUS POST DEEP BRAIN STIMULATOR PLACEMENT: ICD-10-CM

## 2025-03-21 PROCEDURE — 95983 ALYS BRN NPGT PRGRMG 15 MIN: CPT | Performed by: NURSE PRACTITIONER

## 2025-03-21 PROCEDURE — 1036F TOBACCO NON-USER: CPT | Performed by: NURSE PRACTITIONER

## 2025-03-21 PROCEDURE — 99211 OFF/OP EST MAY X REQ PHY/QHP: CPT | Performed by: NURSE PRACTITIONER

## 2025-03-21 PROCEDURE — 3079F DIAST BP 80-89 MM HG: CPT | Performed by: NURSE PRACTITIONER

## 2025-03-21 PROCEDURE — 1126F AMNT PAIN NOTED NONE PRSNT: CPT | Performed by: NURSE PRACTITIONER

## 2025-03-21 PROCEDURE — 3075F SYST BP GE 130 - 139MM HG: CPT | Performed by: NURSE PRACTITIONER

## 2025-03-21 ASSESSMENT — PAIN SCALES - GENERAL: PAINLEVEL_OUTOF10: 0-NO PAIN

## 2025-03-24 ENCOUNTER — APPOINTMENT (OUTPATIENT)
Dept: NEUROLOGY | Facility: CLINIC | Age: 75
End: 2025-03-24
Payer: MEDICARE

## 2025-03-24 VITALS
DIASTOLIC BLOOD PRESSURE: 83 MMHG | SYSTOLIC BLOOD PRESSURE: 147 MMHG | WEIGHT: 212 LBS | HEART RATE: 112 BPM | BODY MASS INDEX: 28.75 KG/M2 | RESPIRATION RATE: 16 BRPM

## 2025-03-24 DIAGNOSIS — G47.52 RBD (REM BEHAVIORAL DISORDER): ICD-10-CM

## 2025-03-24 DIAGNOSIS — G20.A2 PARKINSON'S DISEASE WITHOUT DYSKINESIA, WITH FLUCTUATING MANIFESTATIONS: Primary | ICD-10-CM

## 2025-03-24 DIAGNOSIS — I95.1 ORTHOSTATIC HYPOTENSION: ICD-10-CM

## 2025-03-24 PROCEDURE — 99215 OFFICE O/P EST HI 40 MIN: CPT | Performed by: NURSE PRACTITIONER

## 2025-03-24 PROCEDURE — 95983 ALYS BRN NPGT PRGRMG 15 MIN: CPT | Performed by: NURSE PRACTITIONER

## 2025-03-24 ASSESSMENT — ENCOUNTER SYMPTOMS
OCCASIONAL FEELINGS OF UNSTEADINESS: 1
DEPRESSION: 0
LOSS OF SENSATION IN FEET: 0

## 2025-03-24 ASSESSMENT — UNIFIED PARKINSONS DISEASE RATING SCALE (UPDRS)
HANDMOVEMENTS_RIGHT: 0
TOETAPPING_RIGHT: 2
RIGIDITY_LLE: 2
RIGIDITY_NECK: 2
CHAIR_RISING_SCALE: 1
RIGIDITY_LUE: 2
LEG_AGILITY_LEFT: 2
RIGIDITY_RUE: 1
MINUTES_SINCE_LEVODOPA: 2HRS
AMPLITUDE_RUE: 0
FREEZING_GAIT: 0
LEG_AGILITY_RIGHT: 2
SPONTANEITY_OF_MOVEMENT: 2
TOETAPPING_LEFT: 3
SPEECH: 2
POSTURAL_STABILITY: 0
LEVODOPA: YES
POSTURAL_TREMOR_RIGHTHAND: 0
AMPLITUDE_LUE: 3
PRONATION_SUPINATION_RIGHT: 2
POSTURE: 2
AMPLITUDE_LIP_JAW: 0
DYSKINESIAS_PRESENT: NO
PARKINSONS_MEDS: YES
CLINICAL_STATE: ON
KINETIC_TREMOR_RIGHTHAND: 1
POSTURAL_TREMOR_LEFTHAND: 0
FINGER_TAPPING_RIGHT: 0
GAIT: 2
RIGIDITY_RLE: 0
PRONATION_SUPINATION_LEFT: 3
FINGER_TAPPING_LEFT: 0
KINETIC_TREMOR_LEFTHAND: 1
AMPLITUDE_LLE: 0
CONSTANCY_TREMOR_ATREST: 2
FACIAL_EXPRESSION: 2
AMPLITUDE_RLE: 0
TOTAL_SCORE: 40

## 2025-03-24 NOTE — PATIENT INSTRUCTIONS
#You are going home on group B, new settings for left side of body    If side effects just on one side, then OK to lower that side a click (0.1v) every few mins until resolves.    You have the ability to increase the amplitude on right or left side as needed for symptom improvement: tremor, stiffness, slowness of movement.    You can increase by 0.1V every few days for one side of your body as needed. Increasing one side at a time allows you to notice if side effect with that increase BEFORE you adjust the other side.   You can also decrease if any side effects at any time (dyskinesia or other).    You can TURN OFF the DBS at any time if any concern for side effect/worsening symptoms.          #Medications we can consider Rytary, Crexont or in the near future Vyalev (the subcutaneous pump we discussed)      #Medications we can consider Rytary, Crexont or in the near future Vyalev (the subcutaneous pump we discussed)     #As planned start speech therapy ordered    Once able, we can do LSVT Parkinson's physical therapy (once cleared from surgery)    #Blood Pressure Monitoring: for being on fludricortisone    Doing this to monitor while on fludrocortisone    Sitting and standing blood pressure: Please take sitting blood pressure and heart rate and after standing up for 3 minutes while you are still standing take blood pressure and heart rate again  Lying blood pressure: as well as at bedtime after lying flat for 5 mins  Do this twice a day (mid morning and mid afternoon) for 1 week   Take the lying bedtime readings at bedtime  then send me the readings via [employer] or fax ([phone])    #Continue to increase melatonin as needed    #Reccommended sleep study for sleep apnea/snoring, please let me know if you would like an order    #Continue meds unchanged      Med Dose   Stalevo 150mg  1 tab 6 x/day  (prescribed 5x/day)   Donepezil 10mg 1 tab in the am   Fludrocortisone 0.1mg 1 tab daily     #Follow up as scheduled  with me          Kam Chamorro NP-C  Adult/Gerontological Nurse Practitioner   [facility], Department of Neurology  [facility] [place]  [facility]  [address]  [place] [id number]  Phone: [phone]  Fax: [phone]

## 2025-03-24 NOTE — PROGRESS NOTES
Subjective     Thomas Frankel is a 74 y.o. year old male who presents with Parkinson's Disease, here for follow up visit.    HPI  #Both DBS batteries are due to be replaced, message sent to Dr. Davila  #Labs ordered for cognitive decline  #Stop oxybutynin since on Myrbetriq  Stay off of oxcarbazepine since you stopped x 6M per Dr. Sutton  #LSVT Parkinson's physical and speech therapy ordered  ENT consult needs completed prior to speech therapy  #Blood Pressure Monitoring: for dizziness and or low blood pressure   #Start melatonin 5mg at bedtime. In 2 weeks, if you are still having speaking/moving/dream enactment while sleeping, you can increase to 10mg at night. If tolerated, try taking consistently for several weeks to see if benefit--if not, then OK to stop taking.   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     #Monitor deep brain stimulator battery at least once a month as good habit. You will get a warning when opening your darcie when you are due for replacement and to contact our office asap when that occrs.  [employer] Patient Support  [phone]  #Reccommended sleep study for sleep apnea/snoring, please let me know if you would like an order  #Continue meds unchanged  #For constipation increasing non-caffeine fluids and moving more may help.   I understand you have urinary issues impeding this, please discuss with urology.   #Discuss if you are a candidate for Botox with urologist since having urinary incontinence and nocturia (urinating at night)  #See me in 2M for 1hr DBS    He comes with wife.    IPG did not run off and he had IPGs replaced--s/p b/l DBS generator replacement with MDT Percept RC on 3/6/25.   LUE tremor is worse since, does not feel like himself.   Have an RC and took 2 weeks to learn how to charge it.     Wife wants to take him off of Florinef, said she forgot BP readings w/ everything they had going on, never had low BP or syncope.     No seizures.      Started taking oral B12.     MOTOR SYMPTOMS      +/-                            Comments  Motor sx overall  worse   Tremor + LUE worse   Rigidity -    Bradykinesia -    Balance/gait + Cane all the time    Hx:  L ankle frx makes L foot turn out (he had a plate/surgery)   FOG -    Falls -    PT - Last 1 yr ago   Exercise - [employer] on hold x 3M after IPG replacement     NON MOTOR SYMPTOMS       +/-                               Comments  Orthostatic lightheadedness  - On Florinef though  Denies having issues with hypotension   Cognitive + MCI on NP testing in 2021 but declines to repeat    On donepezil   Insomnia + Nocturia 3-4x/night  Sleeps a lot during the day and goes to be early   RBD + Most nights  Started melatonin   Depression + PhQ2- 0    Declines to see psychiatry or psychology   Anxiety +    Fatigue + Naps a lot during the day--4hrs/day-unchanged   Sialorrhea + Mostly when sleeping   Hypophonia +    Dysphagia + Friday will see ST  ENT-told acid reflux    MBSS recently--recs provided  2/14/25  RECOMMENDATIONS:  - Easy to Chew (IDDSI Level 7)  - Thin liquids (IDDSI Level 0)  - SLP FOR DYSPHAGIA MANAGEMENT  *AVOID MIXED CONSISTENCIES IF NEEDED   Constipation + Better q2-days,   Miralax daily and Senna+docusate  enema PRN, Mg citrate PRN (a few times)   Urinary dysfunction + Urinary IC, nocturia 3-4x/ night  Enlarged testicle and monitoring  Myrbetriq, stopped oxybutynin        Social  Lives with: wife  Help with ADLs: wife drives          [facility] Meds  Med Dose Time   Stalevo 150mg  1 tab 6 x/day  (prescribed 5x/day) ([id number])    q4hrs    Taking middle of the night dose 2am- states he has been doing it for so long he is unsure if he neds it--body wakes him up    Bedtime is 8-9pm    Donepezil 10mg 1 tab in the am    Fludrocortisone 0.1mg 1 tab daily    Melatonin 6mg nightly--will increase if not helping since just started    Latency 1hr    Wearing off None    Side effects      Dyskinesia None    Hallucinations None    Other None        Prior workup  B12 277 2/27/25--started replacement  TSH 0.96--2/28/25    Chronic LBP, hx of multiple surgeries, previously followed by pain management, last visit without weakness on exam, denies paresthesias. Declines to see [facility] management.    CT head 7/26/23:   Impression   1. Stable appearance of bilateral deep brain stimulator leads  atelectasis.  2. The DBS generator is not visualized, but no inflammatory changes  noted along the visualized DBS leads.  3. No acute intracranial hemorrhage, mass effect, or CT apparent  acute infarct. No abnormal enhancement       Patient Health Questionnaire-2 Score: 0            Current Outpatient Medications:     acetaminophen (Tylenol) 325 mg tablet, Take by mouth every 4 hours., Disp: , Rfl:     atorvastatin (Lipitor) 20 mg tablet, Take 1 tablet (20 mg) by mouth once daily., Disp: 90 tablet, Rfl: 3    b complex vitamins capsule, Take 1 capsule by mouth once daily., Disp: , Rfl:     carbidopa-levodopa-entacapone (Stalevo) 37.5-150-200 mg tablet, Take one tablet 5 times a day, Disp: 150 tablet, Rfl: 11    cholecalciferol (Vitamin D-3) 50 MCG (2000 UT) tablet, Take 1 tablet (2,000 Units) by mouth once daily., Disp: , Rfl:     cyanocobalamin (Vitamin B-12) 1,000 mcg tablet, Take 1 tablet (1,000 mcg) by mouth once daily., Disp: , Rfl:     donepezil (Aricept) 10 mg tablet, Take 1 tablet (10 mg) by mouth once daily at bedtime., Disp: , Rfl:     fludrocortisone (Florinef) 0.1 mg tablet, Take 1 tablet (0.1 mg) by mouth once daily in the morning. Take before meals., Disp: 90 tablet, Rfl: 3    MAGNESIUM CITRATE ORAL, Take 400 mg by mouth once daily., Disp: , Rfl:     melatonin 5 mg capsule, Take 1.2 capsules (6 mg) by mouth once daily at bedtime. Starting 2/24/25, take 6 mg nightly x 2 weeks, then may increase to 9 mg nightly, Disp: , Rfl:     Myrbetriq 25 mg tablet extended release 24 hr 24 hr tablet, Take 2 tablets (50  mg) by mouth once daily., Disp: , Rfl:     omeprazole (PriLOSEC) 40 mg DR capsule, Take 1 capsule (40 mg) by mouth once daily in the morning. Take before meals. Do not crush or chew., Disp: 30 capsule, Rfl: 1    oxyCODONE (Roxicodone) 5 mg immediate release tablet, Take 1 tablet (5 mg) by mouth every 6 hours if needed for severe pain (7 - 10)., Disp: 21 tablet, Rfl: 0    polyethylene glycol (Glycolax, Miralax) 17 gram packet, Take 17 g by mouth once daily., Disp: 20 each, Rfl: 0    sennosides-docusate sodium (Carmela-Colace) 8.6-50 mg tablet, Take 1 tablet by mouth once daily., Disp: 20 tablet, Rfl: 0       Objective   Vitals:    25 1459 25 1503   BP: (!) 162/98 147/83   BP Location: Left arm Left arm   Patient Position: Sitting Standing   BP Cuff Size: Large adult Large adult   Pulse: 103 (!) 112   Resp: 16    Weight: 96.2 kg (212 lb)                        Physical Exam    MDS UPDRS 1st Score: Motor Examination  Is the patient on medication for treating the symptoms of Parkinson's Disease?: Yes  Patients receiving medication for treating the symptoms of Parkinson's Disease, shade the patient's clinical state.: On  Is the patient on Levodopa?: Yes  Minutes since last Levodopa dose: 2hrs  Speech: 2  Facial Expression: 2  Rigidty Neck: 2  Rigidty RUE: 1  Rigidity - LUE: 2  Rigidity RLE: 0  Rigidity LLE: 2  Finger Tapping Right Hand: 0  Finger Tapping Left Hand: 0  Hand Movements- Right Hand: 0  Hand Movements- Left Hand: 1  Pronatiaon-Supination Movments - Right Hand: 2  Pronatiaon-Supination Movments Left Hand: 3  Toe Tapping Right Foot: 2  Toe Tapping - Left Foot: 3  Leg Agility - Right Le  Leg Agility - Left le  Arising from Chair: 1  Gait: 2  Freezing of Gait: 0  Postural Stability: 0 (not tested)  Posture: 2  Global Spontanteity of Movment ( Body Bradykinesia): 2  Postural Tremor - Right Hand: 0  Postural Tremor - Left hand: 0  Kinetic Tremor - Right hand: 1  Kinetic Tremor - Left hand: 1  Rest  Tremor Amplitude - RUE: 0  Rest Tremor Amplitude - LUE: 3  Rest Tremor Amplitude - RLE: 0  Rest Tremor Amplitude - LLE: 0  Rest Tremor Amplitude - Lip/Jaw: 0  Constancy of Rest Tremor: 2  MDS UPDRS Total Score: 40  Were dyskinesias (chorea or dystonia) present during examination?: No        DBS procedure  Medtronic Percept RC 3/6/25  L chest IPG/ L STN    Initial Settings:  L STN  C+2-3- 3.2//185 (0-3.5)    Programming:  None    Final Settings:  L STN  C+2-3- 3.2//185 (0-3.5)      Medtronic Percept RC 3/6/25  R chest IPG/ R STN  IP%  Impedances all OK     Initial Settings  R STN  2+3- 6.3//185      Programming:  Increased PW to 100 and tremor more intermittent, increased amp to 6.5 and LUE feels heavy so lowered and resolved. Increased PW to 110 and no SE and tremor resolves.     Final Settings:  R STN  2+3- 6.3/110/185  (0-6.3)        Assessment/Plan   Mr. Thomas Frankel is a 74 y.o. M with PD (dx , s/p bl STN Medtronic DBS in ), cognitive decline (one donepezil). Patient looks overall motorically good 30 years into PD on Stalevo, tolerating without SE or wearing off though he does dose at 2am--tried stopping but could not tolerate. We discussed med options including Rytary, Crexont or in the near future Vyalev (the subcutaneous pump we discussed)--would first try Crexont.     IPGs replaced this month and worsening of LUE sx. The calculated settings are different than previous for both sides, much higher for L and so adjusted and tremor improved in clinic today.   WIfe agreeable to learning remote to make adjustments or go back to lower settings if SE.    OH: He is on fludrocortisone, Wife feels he does not need it, need baseline BPs and can d/c if not needed,    RBD-rec melatonin and he started but not helping just yet, has not been over a week.     Dysphagia: has seen ENT and LSVT pending.     Diagnoses and all orders for this visit:  Parkinson's disease without dyskinesia, with fluctuating  manifestations  Orthostatic hypotension  RBD (REM behavioral disorder)      #You are going home on group B, new settings for left side of body    If side effects just on one side, then OK to lower that side a click (0.1v) every few mins until resolves.    You have the ability to increase the amplitude on right or left side as needed for symptom improvement: tremor, stiffness, slowness of movement.    You can increase by 0.1V every few days for one side of your body as needed. Increasing one side at a time allows you to notice if side effect with that increase BEFORE you adjust the other side.   You can also decrease if any side effects at any time (dyskinesia or other).    You can TURN OFF the DBS at any time if any concern for side effect/worsening symptoms.        #Medications we can consider Rytary, Crexont or in the near future Vyalev (the subcutaneous pump we discussed)     #As planned start speech therapy ordered    Once able, we can do LSVT Parkinson's physical therapy (once cleared from surgery)    #Blood Pressure Monitoring: for being on fludricortisone    Doing this to monitor while on fludrocortisone    Sitting and standing blood pressure: Please take sitting blood pressure and heart rate and after standing up for 3 minutes while you are still standing take blood pressure and heart rate again  Lying blood pressure: as well as at bedtime after lying flat for 5 mins  Do this twice a day (mid morning and mid afternoon) for 1 week   Take the lying bedtime readings at bedtime  then send me the readings via [employer] or fax ([phone])    #Continue to increase melatonin as needed    #Reccommended sleep study for sleep apnea/snoring, please let me know if you would like an order    #Continue meds unchanged      Med Dose   Stalevo 150mg  1 tab 6 x/day  (prescribed 5x/day)   Donepezil 10mg 1 tab in the am   Fludrocortisone 0.1mg 1 tab daily     #Follow up as scheduled with me          Total time of 58 minutes for  today's visit, of which 12 mins were spent with DBS programming as noted above-checking settings, stimulator events, energy level, and impedances and updating settings in the chart.   I, JERALD Chamorro, personally performed  a medically appropriate exam, discussion of care/treatment options for the remaining time.         Kam Chamorro, NP-C  Adult/Gerontological Nurse Practitioner   [facility], Department of Neurology  [facility] [place]  [facility]  [address] [place] [address]  Phone: [phone]  Fax: [phone]

## 2025-03-27 ENCOUNTER — EVALUATION (OUTPATIENT)
Dept: SPEECH THERAPY | Facility: CLINIC | Age: 75
End: 2025-03-27
Payer: MEDICARE

## 2025-03-27 DIAGNOSIS — R13.10 DYSPHAGIA, UNSPECIFIED TYPE: ICD-10-CM

## 2025-03-27 DIAGNOSIS — R49.8 HYPOPHONIA: ICD-10-CM

## 2025-03-27 DIAGNOSIS — G20.A2 PARKINSON'S DISEASE WITHOUT DYSKINESIA, WITH FLUCTUATING MANIFESTATIONS: ICD-10-CM

## 2025-03-27 PROCEDURE — 92524 BEHAVRAL QUALIT ANALYS VOICE: CPT | Mod: GN

## 2025-03-27 PROCEDURE — 92610 EVALUATE SWALLOWING FUNCTION: CPT | Mod: GN

## 2025-03-27 ASSESSMENT — ENCOUNTER SYMPTOMS
LOSS OF SENSATION IN FEET: 0
OCCASIONAL FEELINGS OF UNSTEADINESS: 1
DEPRESSION: 1

## 2025-03-27 ASSESSMENT — PATIENT HEALTH QUESTIONNAIRE - PHQ9
2. FEELING DOWN, DEPRESSED OR HOPELESS: SEVERAL DAYS
10. IF YOU CHECKED OFF ANY PROBLEMS, HOW DIFFICULT HAVE THESE PROBLEMS MADE IT FOR YOU TO DO YOUR WORK, TAKE CARE OF THINGS AT HOME, OR GET ALONG WITH OTHER PEOPLE: SOMEWHAT DIFFICULT
1. LITTLE INTEREST OR PLEASURE IN DOING THINGS: NOT AT ALL
SUM OF ALL RESPONSES TO PHQ9 QUESTIONS 1 AND 2: 1

## 2025-03-27 ASSESSMENT — PAIN - FUNCTIONAL ASSESSMENT: PAIN_FUNCTIONAL_ASSESSMENT: 0-10

## 2025-03-27 ASSESSMENT — PAIN SCALES - GENERAL: PAINLEVEL_OUTOF10: 6

## 2025-03-27 NOTE — PROGRESS NOTES
Bed: ED04  Expected date:   Expected time:   Means of arrival:   Comments:  [age]F abdo pain, bowel obstruction   Speech-Language Pathology    Voice and Clinical Swallow Evaluations    Patient Name: Thomas Frankel  MRN: [id number]  Today's Date: 3/27/2025  Time Calculation  Start Time: 1400  Stop Time: 1510  Time Calculation (min): 70 min      Current Problem:  Problem List Items Addressed This Visit             ICD-10-CM    Parkinson's disease (Multi) G20.A1    Relevant Orders    Follow Up In Speech Therapy    Hypophonia R49.8    Relevant Orders    Follow Up In Speech Therapy      Dysphagia, unspecified type     R13.10    Relevant Orders    Follow Up In Speech Therapy          Assessment/Impressions:    This date, patient presented with hypophonia and oropharyngeal dysphagia 2/2 Parkinson's Disease. Skilled speech therapy is medically necessary and ordered by a physician in order to increase volume for functional, everyday communication of wants/needs/ideas and for tolerance of the least restrictive diet without s/s penetration/aspiration. Without skilled intervention, patient is a risk for cont'd decline of communication skills and social isolation given his reduced ability to effectively communicate with conversational partners across a variety settings as well as adverse health effects resulting from aspiration.     Patient is recommended for outpatient Speech Therapy services targeting voice and swallowing, addressing LSVT LOUD-based exercises, compensatory strategy/tool training as appropriate, bolus trials and diet tolerance with use of safe swallow strategies, pharyngeal strengthening exercises as appropriate, and establishment of a HEP. Patient/wife were in agreement with recommendations and goals/POC.    Diet Recommendations:  Solid Diet Recommendations : Easy to Chew (IDDSI Level 7)  Liquid Diet Recommendations: Thin Liquids (IDDSI Level 0)  Compensatory Swallowing Strategies: Small/single bites/sips, upright at 90 degrees during/30 minutes following PO intake, slow rate of consumption    Treatment Performed: No          Plan of Care:  Frequency: 2x/week  Duration: 8 weeks  Prognosis: Fair  Factors affecting prognosis: (+) Care partner support, (-) Long term h/o PD, patient engagement  Discussed Plan of Care: Patient, Discussed risks/benefits with patient/caregiver, Patient/caregiver agreeable with Plan of Care    GOALS: Start date: 3/27/2025; anticipated end/re-eval date: 2025  By discharge:  LT. Patient will tolerate the least restrictive diet without clinical s/s aspiration while maintaining adequate nutrition/hydration.  2. Patient will improve coordination between the subsystems of respiration, phonation, and articulation for functional speech production with a variety of communication partners across environmental and situational contexts.     STGs:  1. Patient will tolerate the least restrictive diet without clinical s/s aspiration with 100% of PO while maintaining adequate nutrition/hydration.  2. Patient will utilize recommended safe swallow strategies with 90% of PO consumption opportunities given minimal verbal and/or visual cueing.  3. Patient will perform pharyngeal/laryngeal strengthening exercises with 90% accuracy given minimal verbal and/or visual cueing.   4. Patient will increase vocal loudness to 75 dB with mild verbal cues/models during sustained phonation.  5. Patient will maintain max phonation time of >=10 seconds with mild verbal cues/models during sustained phonation.  6. Patient will increase vocal loudness to 70 dB with mild verbal cues/models during reading at word and sentence levels.  7. Patient will increase vocal loudness to 67 dB with mild verbal cues/models to communicate effectively for paragraph level reading.  8. Patient will increase vocal loudness to 65 dB with mild verbal cues/models to communicate effectively during structured speech production.        General Visit Information:  Reason for Referral:    Patient was referred for outpatient Speech Therapy evaluation   "hypophonia and dysphagia d/t Parkinson's Disease. Patient attended today's visit accompanied by his wife who assisted patient in relaying medical history. Per patient/wife, he was diagnosed with PD 30+ years ago. S/s of communication and swallowing deficits began to emerge approx 5 years ago. Patient was seen for ST approx 5 years ago, addressing voice/volume, however, patient did not continue with services in part d/t inconsistent follow through with home program. Patient's wife reported that he has reduced communicative initiative. Today, patient/wife report the following, persistent s/s:    VOICE: Reduced volume; often asked to repeat himself as others cannot hear him; denied change in quality of voice; voice is softer when he is tired and when he is nearly due for his next dosage of medication  MOTOR SPEECH: Reduced clarity of speech/slurring; speech is less clear when he is tired and when he is nearly due for his next dosage of medication  SWALLOWING: Oropharyngeal dysphagia; make sure to cut solid foods into small bites for easier management    Patient was recently seen for MBS on 2/14/2025 with an easy to chew / thin liquid diet recommended (see complete results below). He was also recently seen by ENT, Inderjit Wade CNP, with the following results:    \"ASSESSMENT AND PLAN:   This is an initial visit for dysphagia, hypophonia, and LPR with clinical findings notable for normal vc movement with increased AP and FVC compression. No masses or lesions. IA edema present- will start omeprazole 40 mg daily in morning before breakfast. Referral made to Marybel lema for swallow therapy and variable hypophonia. Patient agreeable to plan. All questions answered. Can follow up in 2-3 months.      We discussed the treatment options to include, medical and surgical options.  We have decided to proceed as follows:   Will start Omeprazole 40 mg daily in morning for LPR, cough, clearing of throat   Referral placed to " "akil lema SLP for swallow and speech therapy.   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[employer])  Communication Needs: Daily communication with wife at home; conversations with children and grandchildren  Interests: History, sports, news    Referred By: CHARMAINE Doshi-CNP  Past Medical History Relevant to Rehab: Parkinson's Disease, oropharyngeal dysphagia  Certification Period Start Date: 3/27/2025  Certification Period End Date: 6/25/2025  Number of Authorized Treatments : Based on MN (MCR A&B; no auth; no copay)  Total Number of Visits : 1        Objective     Pain Assessment:  Pain Assessment: 0-10  Pain Score: 6  Pain Location: Lower back      Baseline Assessment:  Respiratory Status: Room air  Behavior/Cognition: Alert, Cooperative, Pleasant mood  Vision: WFL  Patient Positioning: Upright in Chair  Baseline Vocal Quality: Clear, hypophonic      Oral Mechanism Exam / Cranial Nerve Exam:  Trigeminal Nerve (V)     Jaw ROM: WFL  Facial Nerve (VII)     Asymmetry at rest: WFL, masked facies/flat affect     Lip pucker: Reduced ROM     Smile: Reduced ROM     Lip/smile alternation: Reduced ROM, agility  Glossopharyngeal, Vagus (IX, X)     Uvula at rest: WFL     Palate at rest: WFL     Palatal elevation /a/: WFL  Hypoglossal (XII)     Tongue at rest: WFL     Tongue protrusion: Reduced ROM     Side to side: Reduced ROM     Tongue elevation: Reduced ROM  Diadochokinetic Rates (per 5 seconds) [norms based on males > 65 y.o.]: Reduced agility  --puh: 3.1/sec [norm: 5.4/sec]; reduced articulatory precision/slurring   --tuh: 3.5/sec [norm: 5.3/sec]; reduced articulatory precision/slurring  --kuh: 3.1/sec [norm: 4.9/sec]; reduced articulatory precision/slurring  --puhtuhkuh: 3.7/sec [norm: 4.4/sec]; reduced articulatory precision/slurring  Oral Hygiene: Good  Dentition: Adequate      LSVT LOUD:  --Sustained /a/:    > Avg " "dB SPL: 55.7    > Avg duration (seconds): 14.3  --Greenleaf passage:    > Avg dB SPL: 57.6  --Monologue/spontaneous speech:    > Avg dB SPL: 62.3    LSVT LOUD Stimulability:  --Sustained /a/ with modeling:    > Avg dB SPL: 71.2    > Avg duration (seconds): 15   --Functional phrases:    > Able to increase volume to match therapist's modeled volume with everyday phrases (e.g. how are you?, what time is it?)      GRBAS:  Voice quality based on the GRBAS scale: 0=absent; 1=mild; 2=moderate; 3=severe  > Grade: 1  > Roughness: 0  > Breathiness: 1  > Asthenia: 1  > Strain: 0      Perceptual Voice Evaluation:  Motor Speech Production: Impaired; reduced articulatory precision; generally intact intelligibility of speech  Pitch: Monopitch  Fluency: WFL  Prosody: Flat affect  Resonance: WFL  Loudness: Hypophonic      Modified Barium Swallow 2/14/2025:  \"SPEECH FINDINGS:  Reason for Referral: C/f aspiration/oropharyngeal dysphagia  Patient Hx: PD with DBS placement in 2006, cognitive decline, LBP. ?  Sleep apnea Respiratory Status: WFL  Current diet: no restrictions      Pain:  Patient exhibits no s/s of pain or discomfort during exam.      RECOMMENDATIONS:  - Easy to Chew (IDDSI Level 7)  - Thin liquids (IDDSI Level 0)  - SLP FOR DYSPHAGIA MANAGEMENT  *AVOID MIXED CONSISTENCIES IF NEEDED      STRATEGIES:  Upright for all PO intake  Small bites/sips  Slow rate of consumption      SLP PLAN:  Skilled SLP Services: Skilled SLP intervention for dysphagia is  warranted. SLP Frequency: To be determined by treating SLP in  outpatient setting Duration: TBD  Treatment/Interventions:  - Bolus trials  - Compensatory strategy training  - Diet tolerance/advancement  - Patient/caregiver education      Discussed POC: patient  Discussed Risks/Benefits: Yes  Patient/Caregiver Agreeable: Yes      Short term goals established:  TBD by primary SLP      Education Provided:  Results and recommendations of MBSS reviewed  with patient upon completion of " exam. POC discussed at this time with  need for compensatory swallowing strategies to maximize swallowing  safety and efficiency.  Unable to determine patient comprehension of  information presented.  Provided written handout of strategies and  diet recommendations for reference.      Treatment Provided Today:  N/A      Additional Medical Consults Suggested:  N/A      Repeat Study: as needed      Mechanics of the Swallow Summary:  ORAL PHASE:  Lip Closure - Intact  Tongue Control During Bolus Hold - Intact  Bolus prep/mastication - Impaired  Bolus transport/lingual motion - Impaired  Oral residue - present      PHARYNGEAL PHASE:  Initiation of pharyngeal swallow - intact  Soft palate elevation - Intact  Laryngeal elevation - Intact  Anterior hyoid excursion - Intact  Epiglottic movement - Intact  Laryngeal vestibule closure - Impaired  Pharyngeal stripping wave - Impaired  Pharyngeal contraction (A/P view) - Not tested  Pharyngoesophageal segment opening - Intact  Tongue base retraction - Impaired  Pharyngeal residue - present      ESOPHAGEAL PHASE:  Esophageal clearance - Intact      SLP Impressions with Severity Rating:      Pt presents with oropharyngeal dysphagia upon completion of modified  barium swallow study this date. Swallowing physiology is  characterized by the above noted deficits. Premature pharyngeal entry  viewed with most liquid boluses to level of piriforms. Oral residue  noted with large thin liquid boluses, which filled pharynx post  swallows. Laryngeal penetration and trace, silent aspiration occurred  with large thin liquid boluses. Laryngeal penetration and silent  aspiration continued from oral residue as reswallow completed.  Vallecular and piriform residue noted with all liquid boluses, which  did not appear to accumulate and cleared partially with subsequent  swallows. No further laryngeal penetration was observed with small  thin liquid boluses, thin liquids with barium tablet,  or with  "any  other consistency tested. Discussed potential need for thickened  liquids if post prandial cough or pulmonary changes develop. Patient  will benefit from ongoing therapy. Esophageal column clear post  swallow.      *Of note: The  oblique bolus follow-through is not intended to be  utilized as a diagnostic assessment of the esophagus, but rather as a  tool to observe the biomechanical aspects of the swallow continuum to  inform the need for further evaluation by medical specialists, as  applicable.      OUTCOME MEASURES:  Functional Oral Intake Scale  Functional Oral Intake Scale: Level 6        total oral diet with  multiple consistencies without special preparations but specific food  limitations      EAT-10  0=No problem, 1=Mild problem, 2=Mild to moderate problem, 3=Moderate  problem, 4=Severe problem      EAT 10  My swallowing problem has caused me to lose weight.: 0  My swallowing problem interferes with my ability to go out for  meals.: 1 Swallowing liquids takes extra effort.: 1  Swallowing solids takes extra effort.: 1  Swallowing pills takes extra effort.: 0  Swallowing is painful: 0  The pleasure of eating is affected by my swallowing.: 0  When I swallow food sticks in my throat.: 0  I cough when I eat.: 1  Swallowing is stressful: 0  EAT-10 TOTAL SCORE:: 4      A total score of 3 or above may indicate difficulty with swallowing  safely and/or efficiently      Rosenbek's Penetration Aspiration Scale      Thin Liquids: 8. SILENT ASPIRATION - contrast passes glottis, visible  residue, NO pt response      Nectar Thick Liquids: 1. NO ASPIRATION & NO PENETRATION - no  aspiration, contrast does not enter airway      Honey Thick Liquids: 1. NO ASPIRATION & NO PENETRATION - no  aspiration, contrast does not enter airway      Puree: 1. NO ASPIRATION & NO PENETRATION - no aspiration, contrast  does not enter airway      Solids: 1. NO ASPIRATION & NO PENETRATION - no aspiration, contrast  does not enter airway\" "         OP Education:  Individual(s) Educated: Patient, wife  Verbal Education : Results of evaluation; treatment recommendations  Risk and Benefits Discussed with Patient/Caregiver/Other: yes  Patient/Caregiver Demonstrated Understanding: yes  Plan of Care Discussed and Agreed Upon: yes  Patient Response to Education: Patient/Caregiver Verbalized Understanding of Information, Patient/Caregiver Asked Appropriate Questions

## 2025-04-04 ENCOUNTER — OFFICE VISIT (OUTPATIENT)
Dept: NEUROSURGERY | Facility: CLINIC | Age: 75
End: 2025-04-04
Payer: MEDICARE

## 2025-04-04 VITALS
HEART RATE: 103 BPM | SYSTOLIC BLOOD PRESSURE: 135 MMHG | TEMPERATURE: 97.5 F | RESPIRATION RATE: 16 BRPM | DIASTOLIC BLOOD PRESSURE: 81 MMHG | WEIGHT: 210 LBS | BODY MASS INDEX: 28.44 KG/M2 | HEIGHT: 72 IN

## 2025-04-04 DIAGNOSIS — Z96.89 STATUS POST DEEP BRAIN STIMULATOR PLACEMENT: ICD-10-CM

## 2025-04-04 DIAGNOSIS — K21.9 LPRD (LARYNGOPHARYNGEAL REFLUX DISEASE): ICD-10-CM

## 2025-04-04 DIAGNOSIS — G20.B2 PARKINSON'S DISEASE WITH DYSKINESIA AND FLUCTUATING MANIFESTATIONS: Primary | ICD-10-CM

## 2025-04-04 PROCEDURE — 1036F TOBACCO NON-USER: CPT | Performed by: NEUROLOGICAL SURGERY

## 2025-04-04 PROCEDURE — 3079F DIAST BP 80-89 MM HG: CPT | Performed by: NEUROLOGICAL SURGERY

## 2025-04-04 PROCEDURE — 99211 OFF/OP EST MAY X REQ PHY/QHP: CPT | Performed by: NEUROLOGICAL SURGERY

## 2025-04-04 PROCEDURE — 1126F AMNT PAIN NOTED NONE PRSNT: CPT | Performed by: NEUROLOGICAL SURGERY

## 2025-04-04 PROCEDURE — 3075F SYST BP GE 130 - 139MM HG: CPT | Performed by: NEUROLOGICAL SURGERY

## 2025-04-04 ASSESSMENT — PAIN SCALES - GENERAL: PAINLEVEL_OUTOF10: 0-NO PAIN

## 2025-04-04 NOTE — PROGRESS NOTES
[facility]   Neurosurgery    Diagnosis  Kieran was seen today for post-op.  Diagnoses and all orders for this visit:  Parkinson's disease with dyskinesia and fluctuating manifestations  Status post deep brain stimulator placement      Patient Discussion/Summary  Today we discussed wound care and activity restrictions.   We discussed recharging plans and contingency charging plans if your wife is unavailable or out of town.  I would like to see you again in 4-6 weeks to reassess your incisions.      Provider Impressions  74 y.o. right-handed male with PD diagnosed at age 41, with symptoms of severe tremors, s/p awake b/l STN DBS w/ MDT in 9/2006 by Dr. Laura Simms at [facility]; also, with cognitive decline. Patient transitioned his care to  neurology, Dr. Sutton, in 2023. He had previously undergone b/l DBS generator replacement approx every 1.5-2 years, last on 5/9/23 by Dr. Herve Pelayo, complicated by L incisional leakage requiring washout. He presented to me with his generators again at [facility] and in need of replacement, as Dr. Pelayo is no longer with the system. He is now s/p b/l DBS generator replacement with MDT Percept  on 3/6/25. Pt is doing very well after a recent programming adjustment with Kam. His wife feels comfortable with recharging. They have no complaints.     History of Present Illness  Chief Complaint:   Chief Complaint   Patient presents with    Post-op         HPI: Thomas Frankel is a 74 y.o. right-handed male with PD diagnosed at age 41, with symptoms of severe tremors, s/p awake b/l STN DBS w/ MDT in 9/2006 by Dr. Laura Simms at [facility]; also, with cognitive decline. Patient transitioned his care to  neurology, Dr. Sutton, in 2023. He had previously undergone b/l DBS generator replacement approx every 1.5-2 years, last on 5/9/23 by Dr. Herve Pelayo, complicated by L incisional leakage requiring washout. He presented to me with his generators again at [facility] and in need of replacement,  as Dr. Pelayo is no longer with the system. He is now s/p b/l DBS generator replacement with MDT Miguel NUNES on 3/6/25. During his nursing postop visit on 3/21, his incisions were healing well. However, he and his wife had noticed a change in his therapy since surgery, particularly in his LUE. His device had been interrogated during the visit, which showed a difference in settings on both sides, due to the calculated conversion in therapy from his old Activa system to the new Percept system (V to mA). He and his wife had been discussed that the change in therapy between the old and new system may require some minor programming adjustments, and he was scheduled for a programming visit with Kam in the near future. Since that time, he had a programming visit with Kam on 3/24. Patient presents today for his postop visit.    Today pt and his wife are doing well and have no complaints.       ROS: As noted in HPI.      Previous History  Past Medical History:   Diagnosis Date    Alcohol use disorder, moderate, dependence (Multi) 10/23/2020    Anxiety     Cognitive decline     Dysphagia 01/09/2025    modified barrium swallow WNL    Gait disorder     GERD (gastroesophageal reflux disease)     improved, no meds    Hearing aid worn     History of hepatitis 1969    thinks it was Hep A    Incontinence of urine     Major depressive disorder, recurrent, unspecified 10/23/2020    Parkinson disease (Multi)     Spinal stenosis      Past Surgical History:   Procedure Laterality Date    ANKLE FRACTURE SURGERY Left 2020    w/ instrumentation    CATARACT EXTRACTION, BILATERAL      DEEP BRAIN STIMULATOR PLACEMENT  2006    DEEP BRAIN STIMULATOR PLACEMENT      battery changes Q 18 months    INSERTION / REMOVAL CRANIAL DBS GENERATOR  05/09/2023    generator change    LAMINECTOMY  1999    LUMBAR LAMINECTOMY  2000    revision    RIB FRACTURE SURGERY  07/26/2022    Rib fracture repair    TONSILLECTOMY      as a child    WISDOM TOOTH  EXTRACTION      WOUND DEBRIDEMENT  07/27/2023    DBS incision     Social History     Tobacco Use    Smoking status: Never    Smokeless tobacco: Never   Vaping Use    Vaping status: Never Used   Substance Use Topics    Alcohol use: Yes     Alcohol/week: 4.0 standard drinks of alcohol     Types: 4 Shots of liquor per week    Drug use: Never     Family History   Problem Relation Name Age of Onset    Leukemia Mother Doris Frankel     Blood Disorder Mother Doris Frankel     Heart disease Father      Breast cancer Sister      Brain cancer Brother      Hypertension Brother      Colon cancer Neg Hx       No Known Allergies  Current Outpatient Medications   Medication Instructions    acetaminophen (Tylenol) 325 mg tablet Every 4 hours RT    atorvastatin (LIPITOR) 20 mg, oral, Daily    b complex vitamins capsule 1 capsule, Daily    carbidopa-levodopa-entacapone (Stalevo) 37.5-150-200 mg tablet Take one tablet 5 times a day    cholecalciferol (Vitamin D-3) 50 MCG (2000 UT) tablet 1 tablet, Daily    cyanocobalamin (Vitamin B-12) 1,000 mcg tablet 1 tablet, Daily    donepezil (ARICEPT) 10 mg, Nightly    fludrocortisone (FLORINEF) 0.1 mg, oral, Daily before breakfast    MAGNESIUM CITRATE ORAL 400 mg, Daily    melatonin 6 mg, Nightly    Myrbetriq 25 mg tablet extended release 24 hr 24 hr tablet 2 tablets, Daily    omeprazole (PRILOSEC) 40 mg, oral, Daily before breakfast, Do not crush or chew.    oxyCODONE (ROXICODONE) 5 mg, oral, Every 6 hours PRN    polyethylene glycol (GLYCOLAX, MIRALAX) 17 g, oral, Daily    sennosides-docusate sodium (Carmela-Colace) 8.6-50 mg tablet 1 tablet, oral, Daily         Vitals  /81   Pulse 103   Temp 36.4 °C (97.5 °F)   Resp 16   Ht 1.829 m (6')   Wt 95.3 kg (210 lb)   BMI 28.48 kg/m²       Physical Exam  Incisions are healing very well, still with dermabond.    Results

## 2025-04-07 ENCOUNTER — TREATMENT (OUTPATIENT)
Dept: SPEECH THERAPY | Facility: CLINIC | Age: 75
End: 2025-04-07
Payer: MEDICARE

## 2025-04-07 DIAGNOSIS — G20.A2 PARKINSON'S DISEASE WITHOUT DYSKINESIA, WITH FLUCTUATING MANIFESTATIONS: ICD-10-CM

## 2025-04-07 DIAGNOSIS — R13.10 DYSPHAGIA, UNSPECIFIED TYPE: ICD-10-CM

## 2025-04-07 DIAGNOSIS — R49.8 HYPOPHONIA: ICD-10-CM

## 2025-04-07 PROCEDURE — 92507 TX SP LANG VOICE COMM INDIV: CPT | Mod: GN

## 2025-04-07 RX ORDER — OMEPRAZOLE 40 MG/1
40 CAPSULE, DELAYED RELEASE ORAL
Qty: 90 CAPSULE | Refills: 1 | Status: SHIPPED | OUTPATIENT
Start: 2025-04-07 | End: 2025-05-07

## 2025-04-07 ASSESSMENT — PAIN - FUNCTIONAL ASSESSMENT: PAIN_FUNCTIONAL_ASSESSMENT: 0-10

## 2025-04-07 ASSESSMENT — PAIN SCALES - GENERAL: PAINLEVEL_OUTOF10: 0 - NO PAIN

## 2025-04-07 NOTE — PROGRESS NOTES
Speech-Language Pathology    SLP Adult Outpatient Speech-Language Pathology Treatment     Patient Name: Thomas Frankel  MRN: [id number]  Today's Date: 2025  Time Calculation  Start Time: 1245  Stop Time: 1345  Time Calculation (min): 60 min      Current Problem:          ICD-10-CM    Parkinson's disease (Multi) G20.A1            Hypophonia R49.8              Dysphagia, unspecified type     R13.10                 SLP Assessment:  SLP TX Intervention Outcome: Making Progress Towards Goals  Treatment Tolerance: Patient tolerated treatment well       Plan:  SLP TX Plan: Continue Plan of Care  SLP Frequency: 2x per week  Discussed POC: Patient  Discussed Risks/Benefits: Yes, Patient  Patient/Caregiver Agreeable: Yes      Subjective   Patient arrived to the session with his wife and attended the session independently. Patient appeared to be in a good mood and remained engaged throughout the session.     General Visit Information:  Certification Period Start Date: 3/27/2025  Certification Period End Date: 2025  Number of Authorized Treatments : Based on [place] (MCR A&B; no auth; no copay)  Total Number of Visits : 2      Pain Assessment:  Pain Assessment  Pain Assessment: 0-10  0-10 (Numeric) Pain Score: 0 - No pain      Objective   GOALS: Start date: 3/27/2025; anticipated end/re-eval date: 2025  By discharge:  LT. Patient will tolerate the least restrictive diet without clinical s/s aspiration while maintaining adequate nutrition/hydration.  >GOAL STATUS: Ongoing    2. Patient will improve coordination between the subsystems of respiration, phonation, and articulation for functional speech production with a variety of communication partners across environmental and situational contexts.   >GOAL STATUS: Ongoing    STGs:  1. Patient will tolerate the least restrictive diet without clinical s/s aspiration with 100% of PO while maintaining adequate nutrition/hydration.  >ADDRESSED: Patient observed to take sips  of water during voice exercises. There was x1 instance where patient coughed shortly after taking a sip of water, unable to rule out s/s of aspiration. Patient with wet vocal quality intermittently throughout the session.  >GOAL STATUS: Ongoing; progressing    2. Patient will utilize recommended safe swallow strategies with 90% of PO consumption opportunities given minimal verbal and/or visual cueing.  >ADDRESSED: Patient observed to take small sips of thin liquid (water) via cup edge independently following initial instruction.  >GOAL STATUS: Ongoing; making progress    3. Patient will perform pharyngeal/laryngeal strengthening exercises with 90% accuracy given minimal verbal and/or visual cueing.   >NOT ADDRESSED.  >GOAL STATUS: Ongoing    4. Patient will increase vocal loudness to 75 dB with mild verbal cues/models during sustained phonation.  >ADDRESSED: Following initial instruction and clinician model, patient averaged 63.4 dB SPL for sustained phonation give mod-max cueing. For high /a/, patient averaged 68.9 dB SPL, for low /a/ 67.2 given moderate cueing after initial instruction and clinician model. Note that patient with little variability in lower pitch.  >GOAL STATUS: Ongoing; making progress    5. Patient will maintain max phonation time of >=10 seconds with mild verbal cues/models during sustained phonation.  >ADDRESSED: Patient cued to stop when volume dropped significantly during sustained phonation (typically after about 12 seconds). Patient averaged 15.3 seconds.  >GOAL STATUS: Ongoing; making progress    6. Patient will increase vocal loudness to 70 dB with mild verbal cues/models during reading at word and sentence levels.  >ADDRESSED: Following initial instruction and clinician model, patient averaged 67.2 dB SPL for 10 functional phrases and sentences given max assistance. Patient rates his effort to produce this loudness level an 8/10.  >GOAL STATUS: Ongoing; making progress    7. Patient will  increase vocal loudness to 67 dB with mild verbal cues/models to communicate effectively for paragraph level reading.  >NOT ADDRESSED.  >GOAL STATUS: Ongoing    8. Patient will increase vocal loudness to 65 dB with mild verbal cues/models to communicate effectively during structured speech production.  >ADDRESSED: Following initial instruction and clinician model, patient averaged 62.8 dB SPL for a Scattegories activity given max cues.  >GOAL STATUS: Ongoing; making progress      Outpatient Education:  Adult Outpatient Education  Individual(s) Educated: Patient  Written Education : LSVT based HEP  Verbal Education : feedback on session performance  Risk and Benefits Discussed with Patient/Caregiver/Other: yes  Patient/Caregiver Demonstrated Understanding: yes  Plan of Care Discussed and Agreed Upon: yes  Patient Response to Education: Patient/Caregiver Verbalized Understanding of Information, Patient/Caregiver Asked Appropriate Questions

## 2025-04-11 ENCOUNTER — APPOINTMENT (OUTPATIENT)
Dept: SPEECH THERAPY | Facility: CLINIC | Age: 75
End: 2025-04-11
Payer: MEDICARE

## 2025-04-15 ENCOUNTER — TREATMENT (OUTPATIENT)
Dept: SPEECH THERAPY | Facility: CLINIC | Age: 75
End: 2025-04-15
Payer: MEDICARE

## 2025-04-15 DIAGNOSIS — R49.8 HYPOPHONIA: ICD-10-CM

## 2025-04-15 DIAGNOSIS — R13.10 DYSPHAGIA, UNSPECIFIED TYPE: ICD-10-CM

## 2025-04-15 DIAGNOSIS — G20.A2 PARKINSON'S DISEASE WITHOUT DYSKINESIA, WITH FLUCTUATING MANIFESTATIONS: ICD-10-CM

## 2025-04-15 PROCEDURE — 92507 TX SP LANG VOICE COMM INDIV: CPT | Mod: GN

## 2025-04-15 ASSESSMENT — PAIN SCALES - GENERAL: PAINLEVEL_OUTOF10: 0 - NO PAIN

## 2025-04-15 ASSESSMENT — PAIN - FUNCTIONAL ASSESSMENT: PAIN_FUNCTIONAL_ASSESSMENT: 0-10

## 2025-04-15 NOTE — PROGRESS NOTES
Speech-Language Pathology    SLP Adult Outpatient Speech-Language Pathology Treatment     Patient Name: Thomas Frankel  MRN: [id number]  Today's Date: 4/15/2025  Time Calculation  Start Time: 1100  Stop Time: 1155  Time Calculation (min): 55 min       Current Problem:          ICD-10-CM    Parkinson's disease (Multi) G20.A1            Hypophonia R49.8              Dysphagia, unspecified type     R13.10                 SLP Assessment:  SLP TX Intervention Outcome: Making Progress Towards Goals  Treatment Tolerance: Patient tolerated treatment well       Plan:  SLP TX Plan: Continue Plan of Care  SLP Frequency: 2x per week  Discussed POC: Patient  Discussed Risks/Benefits: Yes, Patient  Patient/Caregiver Agreeable: Yes      Subjective   Patient arrived to the session with his wife and attended the session independently. Patient appeared to be in a good mood and remained engaged throughout the session. He is excited to see family over the weekend.     General Visit Information:  Certification Period Start Date: 3/27/2025  Certification Period End Date: 2025  Number of Authorized Treatments : Based on [place] (MCR A&B; no auth; no copay)  Total Number of Visits : 3      Pain Assessment:  Pain Assessment  Pain Assessment: 0-10  0-10 (Numeric) Pain Score: 0 - No pain      Objective   GOALS: Start date: 3/27/2025; anticipated end/re-eval date: 2025  By discharge:  LT. Patient will tolerate the least restrictive diet without clinical s/s aspiration while maintaining adequate nutrition/hydration.  >GOAL STATUS: Ongoing    2. Patient will improve coordination between the subsystems of respiration, phonation, and articulation for functional speech production with a variety of communication partners across environmental and situational contexts.   >GOAL STATUS: Ongoing    STGs:  1. Patient will tolerate the least restrictive diet without clinical s/s aspiration with 100% of PO while maintaining adequate  nutrition/hydration.  >ADDRESSED: Patient observed to take sips of water during voice exercises. There was x1 instance where patient coughed shortly after taking a sip of water, unable to rule out s/s of aspiration. Patient with wet vocal quality intermittently throughout the session.  >GOAL STATUS: Ongoing; progressing    2. Patient will utilize recommended safe swallow strategies with 90% of PO consumption opportunities given minimal verbal and/or visual cueing.  >ADDRESSED: Patient observed to take small sips of thin liquid (water) via cup edge independently following initial instruction.  >GOAL STATUS: Ongoing; making progress    3. Patient will perform pharyngeal/laryngeal strengthening exercises with 90% accuracy given minimal verbal and/or visual cueing.   >NOT ADDRESSED.  >GOAL STATUS: Ongoing    4. Patient will increase vocal loudness to 70 dB with mild verbal cues/models during sustained phonation.  >ADDRESSED: Following initial instruction and clinician model, patient averaged 64.1 dB SPL (prev. 63.4) for sustained phonation give max cueing. For high /a/, patient averaged 67.2 dB SPL (prev. 68.9), for low /a/ 64.3 (prev. 67.2) given max cueing after initial instruction and clinician model. Note that patient with little variability in lower pitch. Goal modified due to difficulty nearing the goal of 75 dB SPL.  >GOAL STATUS: Modified 4/15, ongoing; making progress    5. Patient will maintain max phonation time of >=10 seconds with mild verbal cues/models during sustained phonation.  >ADDRESSED: Patient cued to stop when volume dropped significantly during sustained phonation (typically after about 12 seconds). Patient averaged 7.1 seconds (prev. 15.3). Patient able to hold much longer than reported value, but system stopped recording his trial when he dropped below 60 dB SPL.  >GOAL STATUS: Ongoing; making progress    6. Patient will increase vocal loudness to 70 dB with mild verbal cues/models during  reading at word and sentence levels.  >ADDRESSED: Following initial instruction and clinician model, patient averaged 66.9 dB SPL f(prev. 67.2) for 10 functional phrases and sentences given max assistance. Patient rates his effort to produce this loudness level an 8/10.  >GOAL STATUS: Ongoing; making progress    7. Patient will increase vocal loudness to 67 dB with mild verbal cues/models to communicate effectively for paragraph level reading.  >NOT ADDRESSED.  >GOAL STATUS: Ongoing    8. Patient will increase vocal loudness to 65 dB with mild verbal cues/models to communicate effectively during structured speech production.  >ADDRESSED: Following initial instruction and clinician model, patient averaged 66.8 dB SPL (prev. 62.8) for a Scattegories activity given max cues.  >GOAL STATUS: Ongoing; making progress      Outpatient Education:  Adult Outpatient Education  Individual(s) Educated: Patient  Written Education : LSVT based HEP  Verbal Education : feedback on session performance  Risk and Benefits Discussed with Patient/Caregiver/Other: yes  Patient/Caregiver Demonstrated Understanding: yes  Plan of Care Discussed and Agreed Upon: yes  Patient Response to Education: Patient/Caregiver Verbalized Understanding of Information, Patient/Caregiver Asked Appropriate Questions

## 2025-04-16 ENCOUNTER — OFFICE VISIT (OUTPATIENT)
Dept: GASTROENTEROLOGY | Facility: CLINIC | Age: 75
End: 2025-04-16
Payer: MEDICARE

## 2025-04-16 VITALS
DIASTOLIC BLOOD PRESSURE: 75 MMHG | SYSTOLIC BLOOD PRESSURE: 128 MMHG | WEIGHT: 208 LBS | TEMPERATURE: 97.2 F | HEART RATE: 99 BPM | BODY MASS INDEX: 28.17 KG/M2 | HEIGHT: 72 IN

## 2025-04-16 DIAGNOSIS — D12.6 ADENOMATOUS POLYP OF COLON, UNSPECIFIED PART OF COLON: ICD-10-CM

## 2025-04-16 DIAGNOSIS — Z12.11 COLON CANCER SCREENING: Primary | ICD-10-CM

## 2025-04-16 PROCEDURE — 3078F DIAST BP <80 MM HG: CPT | Performed by: INTERNAL MEDICINE

## 2025-04-16 PROCEDURE — 99203 OFFICE O/P NEW LOW 30 MIN: CPT | Performed by: INTERNAL MEDICINE

## 2025-04-16 PROCEDURE — 99213 OFFICE O/P EST LOW 20 MIN: CPT | Performed by: INTERNAL MEDICINE

## 2025-04-16 PROCEDURE — 3074F SYST BP LT 130 MM HG: CPT | Performed by: INTERNAL MEDICINE

## 2025-04-16 PROCEDURE — 1036F TOBACCO NON-USER: CPT | Performed by: INTERNAL MEDICINE

## 2025-04-16 NOTE — PATIENT INSTRUCTIONS
You will be scheduled for colonoscopy.    Eat a low fiber diet for 3 days prior.     [email or web address]    Two days prior drink a bottle of magnesium citrate at night.    The day prior stay on clear liquids and do the Miralax Gatorade prep that night and the morning of the procedure.

## 2025-04-16 NOTE — PROGRESS NOTES
History Of Present Illness  Thomas Frankel is a 74 y.o. male presenting with colonoscopy evaluation.    He has a history of Parkinson's disease and is status post deep brain stimulation, mild dementia, orthostatic hypotension and chronic constipation treated with daily MiraLAX and docusate senna.  On that regimen he has a bowel movement every 2 to 3 days and also needs to use magnesium citrate intermittently.  There is no abdominal pain or rectal bleeding.  There is no family history of colorectal cancer.  His last colonoscopy was in 2015 by Dr. Whitehead and was notable for 2 small polyps that were removed.    Him and his wife are interested in additional screening.  Past Medical History  Medical History[1]    Surgical History  Surgical History[2]     Social History  He reports that he has never smoked. He has never used smokeless tobacco. He reports current alcohol use of about 4.0 standard drinks of alcohol per week. He reports that he does not use drugs.    Family History  Family History[3]     Allergies  Patient has no known allergies.    Last Recorded Vitals  There were no vitals taken for this visit.       Physical Exam      Labs  Lab Results   Component Value Date    GLUCOSE 108 (H) 02/27/2025    CALCIUM 9.2 02/27/2025     02/27/2025    K 4.8 02/27/2025    CO2 27 02/27/2025     (H) 02/27/2025    BUN 18 02/27/2025    CREATININE 0.84 02/27/2025     Lab Results   Component Value Date    WBC 5.3 02/27/2025    HGB 14.5 02/27/2025    HCT 42.9 02/27/2025    MCV 94 02/27/2025     02/27/2025     02/27/2025    K 4.8 02/27/2025     (H) 02/27/2025    CO2 27 02/27/2025    BUN 18 02/27/2025    CREATININE 0.84 02/27/2025    CALCIUM 9.2 02/27/2025    PROT 7.0 01/09/2025    BILITOT 0.5 01/09/2025    ALKPHOS 78 01/09/2025    ALT 7 (L) 01/09/2025    AST 13 01/09/2025    GLUCOSE 108 (H) 02/27/2025    CRP 0.16 09/15/2023           Imaging     No results found.  ECG 12 Lead  Sinus tachycardia  Junctional  ST depression, probably normal  Borderline ECG  When compared with ECG of 15-SEP-2023 16:34,  Previous ECG has undetermined rhythm, needs review  ST less depressed in Lateral leads  Nonspecific T wave abnormality no longer evident in Anterior leads         ASSESSMENT & PLAN  Problem List Items Addressed This Visit    None  Visit Diagnoses         Codes      Colon cancer screening    -  Primary Z12.11    Relevant Orders    Colonoscopy Screening; High Risk Patient      Adenomatous polyp of colon, unspecified part of colon     D12.6    Relevant Orders    Colonoscopy Screening; High Risk Patient          He is due for repeat colonoscopy given his prior history of adenomas.  However the prep will be somewhat challenging given his comorbidity and chronic constipation.  We will have him stay on a low residue diet for a few days prior.  He will drink a bottle of magnesium citrate 2 days prior to the exam and do the standard split prep when evening prior to and morning of the procedure.  The procedure will be done with anesthesia assistance given his comorbidity.    I spent 20 minutes in the professional and overall care of this patient.      Georges Glass MD         [1]   Past Medical History:  Diagnosis Date    Alcohol use disorder, moderate, dependence (Multi) 10/23/2020    Anxiety     Cognitive decline     Dysphagia 01/09/2025    modified barrium swallow WNL    Gait disorder     GERD (gastroesophageal reflux disease)     improved, no meds    Hearing aid worn     History of hepatitis 1969    thinks it was Hep A    Incontinence of urine     Major depressive disorder, recurrent, unspecified 10/23/2020    Parkinson disease (Multi)     Spinal stenosis    [2]   Past Surgical History:  Procedure Laterality Date    ANKLE FRACTURE SURGERY Left 2020    w/ instrumentation    CATARACT EXTRACTION, BILATERAL      DEEP BRAIN STIMULATOR PLACEMENT  2006    DEEP BRAIN STIMULATOR PLACEMENT      battery changes Q 18 months    INSERTION  / REMOVAL CRANIAL DBS GENERATOR  05/09/2023    generator change    LAMINECTOMY  1999    LUMBAR LAMINECTOMY  2000    revision    RIB FRACTURE SURGERY  07/26/2022    Rib fracture repair    TONSILLECTOMY      as a child    WISDOM TOOTH EXTRACTION      WOUND DEBRIDEMENT  07/27/2023    DBS incision   [3]   Family History  Problem Relation Name Age of Onset    Leukemia Mother Doris Frankel     Blood Disorder Mother Doris Frankel     Heart disease Father      Breast cancer Sister      Brain cancer Brother      Hypertension Brother      Colon cancer Neg Hx

## 2025-04-18 ENCOUNTER — APPOINTMENT (OUTPATIENT)
Dept: SPEECH THERAPY | Facility: CLINIC | Age: 75
End: 2025-04-18
Payer: MEDICARE

## 2025-04-21 ENCOUNTER — APPOINTMENT (OUTPATIENT)
Dept: NEUROLOGY | Facility: CLINIC | Age: 75
End: 2025-04-21
Payer: MEDICARE

## 2025-04-21 VITALS
BODY MASS INDEX: 28.75 KG/M2 | DIASTOLIC BLOOD PRESSURE: 75 MMHG | SYSTOLIC BLOOD PRESSURE: 123 MMHG | WEIGHT: 212 LBS | HEART RATE: 114 BPM | RESPIRATION RATE: 18 BRPM

## 2025-04-21 DIAGNOSIS — G20.A2 PARKINSON'S DISEASE WITHOUT DYSKINESIA, WITH FLUCTUATING MANIFESTATIONS: Primary | ICD-10-CM

## 2025-04-21 PROCEDURE — 99215 OFFICE O/P EST HI 40 MIN: CPT | Performed by: NURSE PRACTITIONER

## 2025-04-21 PROCEDURE — 95983 ALYS BRN NPGT PRGRMG 15 MIN: CPT | Performed by: NURSE PRACTITIONER

## 2025-04-21 RX ORDER — CARBIDOPA AND LEVODOPA 70; 280 MG/1; MG/1
2 CAPSULE, EXTENDED RELEASE ORAL 3 TIMES DAILY
Qty: 180 EACH | Refills: 3 | Status: CANCELLED | OUTPATIENT
Start: 2025-04-21

## 2025-04-21 ASSESSMENT — PATIENT HEALTH QUESTIONNAIRE - PHQ9
SUM OF ALL RESPONSES TO PHQ9 QUESTIONS 1 AND 2: 3
SUM OF ALL RESPONSES TO PHQ QUESTIONS 1-9: 11
8. MOVING OR SPEAKING SO SLOWLY THAT OTHER PEOPLE COULD HAVE NOTICED. OR THE OPPOSITE, BEING SO FIGETY OR RESTLESS THAT YOU HAVE BEEN MOVING AROUND A LOT MORE THAN USUAL: SEVERAL DAYS
6. FEELING BAD ABOUT YOURSELF - OR THAT YOU ARE A FAILURE OR HAVE LET YOURSELF OR YOUR FAMILY DOWN: SEVERAL DAYS
2. FEELING DOWN, DEPRESSED OR HOPELESS: MORE THAN HALF THE DAYS
4. FEELING TIRED OR HAVING LITTLE ENERGY: MORE THAN HALF THE DAYS
3. TROUBLE FALLING OR STAYING ASLEEP OR SLEEPING TOO MUCH: NEARLY EVERY DAY
9. THOUGHTS THAT YOU WOULD BE BETTER OFF DEAD, OR OF HURTING YOURSELF: NOT AT ALL
10. IF YOU CHECKED OFF ANY PROBLEMS, HOW DIFFICULT HAVE THESE PROBLEMS MADE IT FOR YOU TO DO YOUR WORK, TAKE CARE OF THINGS AT HOME, OR GET ALONG WITH OTHER PEOPLE: SOMEWHAT DIFFICULT
5. POOR APPETITE OR OVEREATING: NOT AT ALL
1. LITTLE INTEREST OR PLEASURE IN DOING THINGS: SEVERAL DAYS
7. TROUBLE CONCENTRATING ON THINGS, SUCH AS READING THE NEWSPAPER OR WATCHING TELEVISION: SEVERAL DAYS

## 2025-04-21 ASSESSMENT — UNIFIED PARKINSONS DISEASE RATING SCALE (UPDRS)
MINUTES_SINCE_LEVODOPA: 2HRS
KINETIC_TREMOR_LEFTHAND: 1
PRONATION_SUPINATION_LEFT: 2
FINGER_TAPPING_RIGHT: 0
POSTURE: 2
RIGIDITY_RUE: 0
POSTURAL_STABILITY: 0
LEVODOPA: YES
POSTURAL_TREMOR_RIGHTHAND: 1
SPONTANEITY_OF_MOVEMENT: 3
PARKINSONS_MEDS: YES
HANDMOVEMENTS_RIGHT: 0
AMPLITUDE_LIP_JAW: 0
AMPLITUDE_RLE: 1
DYSKINESIAS_PRESENT: NO
TOETAPPING_RIGHT: 1
SPEECH: 2
AMPLITUDE_LLE: 1
PRONATION_SUPINATION_RIGHT: 1
AMPLITUDE_LUE: 2
RIGIDITY_LUE: 0
KINETIC_TREMOR_RIGHTHAND: 1
CLINICAL_STATE: ON
POSTURAL_TREMOR_LEFTHAND: 1
LEG_AGILITY_RIGHT: 2
TOTAL_SCORE: 39
FREEZING_GAIT: 0
GAIT: 2
RIGIDITY_NECK: 0
LEG_AGILITY_LEFT: 3
TOETAPPING_LEFT: 2
CONSTANCY_TREMOR_ATREST: 2
RIGIDITY_RLE: 2
FACIAL_EXPRESSION: 2
AMPLITUDE_RUE: 0
FINGER_TAPPING_LEFT: 0
RIGIDITY_LLE: 2
CHAIR_RISING_SCALE: 2

## 2025-04-21 ASSESSMENT — ENCOUNTER SYMPTOMS
OCCASIONAL FEELINGS OF UNSTEADINESS: 0
LOSS OF SENSATION IN FEET: 0
DEPRESSION: 1

## 2025-04-21 NOTE — PATIENT INSTRUCTIONS
"#I sent message to Dr. Glass re: DBS precautions for colonoscopy.    #Offered GI consult for constipation    #You are going home on \"new group\" ,new settings for left side of body (R chest IPG/battery)    You can always go back to \"baseline\" settings you came in on today    \"Old group\" is your old settings you were on before we made any changes.     If side effects just on one side, then OK to lower that side a click (0.1v) every few mins until resolves.    You have the ability to increase the amplitude on right or left side as needed for symptom improvement: tremor, stiffness, slowness of movement.    You can increase by 0.1V every few days for one side of your body as needed. Increasing one side at a time allows you to notice if side effect with that increase BEFORE you adjust the other side.   You can also decrease if any side effects at any time (dyskinesia or other).    You can TURN OFF the DBS at any time if any concern for side effect/worsening symptoms.      #Medications we can consider Rytary, Crexont or in the near future Vyalev (the subcutaneous pump we discussed)     #Continue speech therapy ordered    #Continue to increase melatonin as needed    #Continue meds unchanged      Med Dose   Stalevo 150mg  1 tab 6 x/day  (prescribed 5x/day)   Donepezil 10mg 1 tab in the am   Fludrocortisone 0.1mg 1 tab daily     #Follow up in  with me for DBS 1 hr on a day when Dr. Sutton is here.     Kam Chamorro, NP-C  Adult/Gerontological Nurse Practitioner   [facility], Department of Neurology  [facility]  [address] [place] [address]  Phone: [phone]  Fax: [phone]  "

## 2025-04-21 NOTE — PROGRESS NOTES
Subjective     Thomas Frankel is a 74 y.o. year old male who presents with Parkinson's Disease, here for follow up visit.    HPI  Last visit 3/24/25 with me:  #You are going home on group B, new settings for left side of body  #Medications we can consider Rytary, Crexont or in the near future Vyalev (the subcutaneous pump we discussed)   #As planned start speech therapy ordered  Once able, we can do LSVT Parkinson's physical therapy (once cleared from surgery)  #Blood Pressure Monitoring: for being on fludricortisone  Doing this to monitor while on fludrocortisone  #Continue to increase emlatonin as needed  #Reccommended sleep study for sleep apnea/snoring, please let me know if you would like an order  #Continue meds unchanged    Med Dose   Stalevo 150mg  1 tab 6 x/day  (prescribed 5x/day)   Donepezil 10mg 1 tab in the am   Fludrocortisone 0.1mg 1 tab daily     #Follow up as scheduled with me    He comes with wife.    Did daytime BP 2 times a day x 5 days, at MD officve was in 150s. Usually 120s-130s usually and dropped when standing but does not remember how much, wife did not help, patient monitoed on own, they report never less than 100 for SBP.     Tremors are worse overall at the end of his meds. Tremor is worse than when here in March. Mostly LUE.     Wife is recharging IPG without issue, checking daily and recharging q4-5 days.     No seizures.     MOTOR SYMPTOMS      +/-                            Comments  Motor sx overall  worse   Tremor + LUE worse   Rigidity -    Bradykinesia -    Balance/gait + Cane all the time    Hx:  L ankle frx makes L foot turn out (he had a plate/surgery)   FOG -    Falls + Last week, wife was not with him, fell in laundry room and scraped his arm.    PT - Last 1 yr ago   Exercise - [employer] on hold x 3M after IPG replacement       NON MOTOR SYMPTOMS       +/-                               Comments  Orthostatic lightheadedness  - On Florinef, as above in HPI  Denies having issues  with hypotension or HTN   Cognitive + MCI on NP testing in 2021 but declines to repeat    On donepezil  Wife may go out of town to see daughter but needs to get caregivers for him   Insomnia + Stable    Nocturia 3-4x/night  Sleeps a lot during the day and goes to be early   RBD + Most nights  Started melatonin 6mg  and not better  Wife sleeps in seperate rooms and hears him often   Depression + PhQ2- 11, No SI    Declines to see psychiatry or psychology   Anxiety +    Fatigue + Naps a lot during the day--4hrs/day-unchanged   Sialorrhea + Worse but does not want injections   Hypophonia + Stable   Dysphagia + Doing ST but does not like it  Will not do exercises at home      Hx:  ENT-told acid reflux  Hx aspiration pna  MBSS   2/14/25  RECOMMENDATIONS:  - Easy to Chew (IDDSI Level 7)  - Thin liquids (IDDSI Level 0)  - SLP FOR DYSPHAGIA MANAGEMENT  *AVOID MIXED CONSISTENCIES IF NEEDED   Constipation + Worse  Q3-4-days,   Miralax daily and Senna+docusate  enema PRN, Mg citrate PRN (a few times)   Urinary dysfunction + Urinary IC, nocturia 3-4x/ night  Enlarged testicle and monitoring  Myrbetriq, stopped oxybutynin        Social  Lives with: wife  Help with ADLs: wife drives, does meds and recharges his IPG         [facility] Meds  Med Dose Time   Stalevo 150mg  1 tab 6 x/day  (prescribed 5x/day) ([id number])    q4hrs    Taking middle of the night dose 2am- states he has been doing it for so long he is unsure if he neds it--body wakes him up    Bedtime is 8-9pm    Donepezil 10mg 1 tab in the evening    Fludrocortisone 0.1mg 1 tab daily    Melatonin 6mg nightly--will increase if not helping since just started    Latency 1hr    Wearing off 30mins prior intense shaking    Side effects     Dyskinesia None    Hallucinations None    Other None        Prior meds:  Zoloft years ago-not sure if helpful    Prior workup  B12 277 2/27/25--started replacement  TSH 0.96--2/28/25    Chronic LBP, hx of  multiple surgeries, previously followed by pain management, last visit without weakness on exam, denies paresthesias. Declines to see [facility] management.    CT head 7/26/23:   Impression   1. Stable appearance of bilateral deep brain stimulator leads  atelectasis.  2. The DBS generator is not visualized, but no inflammatory changes  noted along the visualized DBS leads.  3. No acute intracranial hemorrhage, mass effect, or CT apparent  acute infarct. No abnormal enhancement       Patient Health Questionnaire-2 Score: 3  Patient Health Questionnaire-9 Score: 11         Current Outpatient Medications:     acetaminophen (Tylenol) 325 mg tablet, Take by mouth every 4 hours., Disp: , Rfl:     atorvastatin (Lipitor) 20 mg tablet, Take 1 tablet (20 mg) by mouth once daily., Disp: 90 tablet, Rfl: 3    b complex vitamins capsule, Take 1 capsule by mouth once daily., Disp: , Rfl:     carbidopa-levodopa-entacapone (Stalevo) 37.5-150-200 mg tablet, Take one tablet 5 times a day, Disp: 150 tablet, Rfl: 11    cholecalciferol (Vitamin D-3) 50 MCG (2000 UT) tablet, Take 1 tablet (2,000 Units) by mouth once daily., Disp: , Rfl:     cyanocobalamin (Vitamin B-12) 1,000 mcg tablet, Take 1 tablet (1,000 mcg) by mouth once daily., Disp: , Rfl:     donepezil (Aricept) 10 mg tablet, Take 1 tablet (10 mg) by mouth once daily at bedtime., Disp: , Rfl:     fludrocortisone (Florinef) 0.1 mg tablet, Take 1 tablet (0.1 mg) by mouth once daily in the morning. Take before meals., Disp: 90 tablet, Rfl: 3    MAGNESIUM CITRATE ORAL, Take 400 mg by mouth once daily., Disp: , Rfl:     melatonin 5 mg capsule, Take 1.2 capsules (6 mg) by mouth once daily at bedtime. Starting 2/24/25, take 6 mg nightly x 2 weeks, then may increase to 9 mg nightly, Disp: , Rfl:     Myrbetriq 25 mg tablet extended release 24 hr 24 hr tablet, Take 2 tablets (50 mg) by mouth once daily., Disp: , Rfl:     omeprazole (PriLOSEC) 40 mg DR capsule, TAKE 1 CAPSULE (40 MG) BY MOUTH  ONCE DAILY IN THE MORNING. TAKE BEFORE MEALS. DO NOT CRUSH OR CHEW., Disp: 90 capsule, Rfl: 1    oxyCODONE (Roxicodone) 5 mg immediate release tablet, Take 1 tablet (5 mg) by mouth every 6 hours if needed for severe pain (7 - 10). (Patient not taking: Reported on 2025), Disp: 21 tablet, Rfl: 0    polyethylene glycol (Glycolax, Miralax) 17 gram packet, Take 17 g by mouth once daily., Disp: 20 each, Rfl: 0    sennosides-docusate sodium (Carmela-Colace) 8.6-50 mg tablet, Take 1 tablet by mouth once daily., Disp: 20 tablet, Rfl: 0       Objective   Vitals:    25 1108 25 1115   BP: 143/83 123/75   BP Location: Right arm Right arm   Patient Position: Sitting Standing   BP Cuff Size: Large adult Large adult   Pulse: 106 (!) 114   Resp: 18    Weight: 96.2 kg (212 lb)                Patient Health Questionnaire-9 Score: 11         Physical Exam    MDS UPDRS 1st Score: Motor Examination  Is the patient on medication for treating the symptoms of Parkinson's Disease?: Yes  Patients receiving medication for treating the symptoms of Parkinson's Disease, shade the patient's clinical state.: On  Is the patient on Levodopa?: Yes  Minutes since last Levodopa dose: 2hrs  Speech: 2  Facial Expression: 2  Rigidty Neck: 0  Rigidty RUE: 0  Rigidity - LUE: 0  Rigidity RLE: 2  Rigidity LLE: 2  Finger Tapping Right Hand: 0  Finger Tapping Left Hand: 0  Hand Movements- Right Hand: 0  Hand Movements- Left Hand: 1  Pronatiaon-Supination Movments - Right Hand: 1  Pronatiaon-Supination Movments Left Hand: 2  Toe Tapping Right Foot: 1  Toe Tapping - Left Foot: 2 (hx L ankle frx)  Leg Agility - Right Le  Leg Agility - Left leg: 3  Arising from Chair: 2  Gait: 2  Freezing of Gait: 0  Postural Stability: 0 (not tested)  Posture: 2  Global Spontanteity of Movment ( Body Bradykinesia): 3  Postural Tremor - Right Hand: 1  Postural Tremor - Left hand: 1  Kinetic Tremor - Right hand: 1  Kinetic Tremor - Left hand: 1  Rest Tremor Amplitude  - RUE: 0  Rest Tremor Amplitude - LUE: 2  Rest Tremor Amplitude - RLE: 1  Rest Tremor Amplitude - LLE: 1  Rest Tremor Amplitude - Lip/Jaw: 0  Constancy of Rest Tremor: 2  MDS UPDRS Total Score: 39  Were dyskinesias (chorea or dystonia) present during examination?: No        DBS procedure  Medtronic Percept RC 3/6/25  L chest IPG/ L STN    Initial Settings:  L STN  C+2-3- 3.2/90/185 (0-3.5)    Programming:  None    Final Settings:  L STN  C+2-3- 3.2/90/185 (0-3.5)      Medtronic Percept RC 3/6/25  R chest IPG/ R STN  IP%  Impedances all OK     Initial Settings  Group B  R STN  2+3- 6.3/110/185  (0-6.3)    Programming:  Marked worsening of tremor with DBS off.     Interleaving  C+ 2- 2.5/60/125---cannot go higher d/t paresthesia--PW >70 without improvement and SE at 80 (pulling)  C+3- 2.5/60/125---lowered to 2.3 d/t paresthesia    Interleaving---made group C  1+2- 4.0/70/125 ((lower worsens tremor))  2+3- 5.0/80/125 ((lower caused tremors))--had some breakthrough so at PW 90 improved     In the future if needed:  Continue adjustment of group C  Double monopolar vs double monopolar interleaving    Final Settings:  Group C--new group  R STN  1+2- 4.0/70/125    2+3- 5.0/80/125     Group B-old group   R STN  2+3- 6.3/110/185  (0-6.3)    Old group  R STN  2+3- 6.3/90/185  (0-6.3)        Assessment/Plan   Mr. Thomas Frankel is a 74 y.o. M with PD (dx , s/p bl STN Medtronic DBS in ), cognitive decline (one donepezil). Patient looks overall motorically good 30 years into PD on Stalevo, tolerating without SE, though frequent dosing including at 2am--tried stopping but could not tolerate. IPGs replaced to Percept 3/6/25 with worsening of motor sx, DBS adjusted last visit and still with bothersome LUE tremor.       PD: LUE tremor worse, more wearing off  DBS adjusted with excellent improvement  Consider Rytary, Crexont or Vyalev in the future   OH: continue Mague  Mentioned monitored BPs x 5 days without HTN or  "hypotension  Offered to try and stop but prefers to continue   RBD: continue melatonin and increase as needed (still active at 6mg nightly)  Instructed can increase to 10mg and 15mg if needed  Dysphagia: has seen ENT and doing ST currently  Depression: Worse, PHQ 9 is 11, denies SI   Offered psychiatry and psychology referral and declines  Offered med for mood as well and declines  Constipation: offered GI c/s for meds, declines        Diagnoses and all orders for this visit:  Parkinson's disease without dyskinesia, with fluctuating manifestations      #I sent message to Dr. Glass re: DBS precautions for colonoscopy.    #Offered GI consult for constipation    #You are going home on \"new group\" , new settings for left side of body (R chest IPG/battery)    You can always go back to \"baseline\" settings you came in on today    \"Old group\" is your old settings you were on before we made any changes.     If side effects just on one side, then OK to lower that side a click (0.1v) every few mins until resolves.    You have the ability to increase the amplitude on right or left side as needed for symptom improvement: tremor, stiffness, slowness of movement.    You can increase by 0.1V every few days for one side of your body as needed. Increasing one side at a time allows you to notice if side effect with that increase BEFORE you adjust the other side.   You can also decrease if any side effects at any time (dyskinesia or other).    You can TURN OFF the DBS at any time if any concern for side effect/worsening symptoms.      #Medications we can consider Rytary, Crexont or in the near future Vyalev (the subcutaneous pump we discussed)     #Continue speech therapy ordered    #Continue to increase melatonin as needed    #Continue meds unchanged      Med Dose   Stalevo 150mg  1 tab 6 x/day  (prescribed 5x/day)   Donepezil 10mg 1 tab in the am   Fludrocortisone 0.1mg 1 tab daily     #Follow up in 3M with me for DBS 1 hr on a day " when Dr. Sutton is here.           Total time of 53 minutes for today's visit, of which 12 mins were spent with DBS programming as noted above-checking settings, stimulator events, energy level, and impedances and updating settings in the chart.   IJERALD, personally performed  a medically appropriate exam, discussion of care/treatment options for the remaining time.         Kam Chamorro, JERALD-C  Adult/Gerontological Nurse Practitioner   [facility], Department of Neurology  Neurological [facility]  [id number] [place] [address]  Phone: [phone]  Fax: [phone]

## 2025-04-22 ENCOUNTER — TELEPHONE (OUTPATIENT)
Dept: NEUROLOGY | Facility: CLINIC | Age: 75
End: 2025-04-22
Payer: MEDICARE

## 2025-04-22 ENCOUNTER — APPOINTMENT (OUTPATIENT)
Dept: SPEECH THERAPY | Facility: CLINIC | Age: 75
End: 2025-04-22
Payer: MEDICARE

## 2025-04-22 ENCOUNTER — DOCUMENTATION (OUTPATIENT)
Dept: SPEECH THERAPY | Facility: CLINIC | Age: 75
End: 2025-04-22
Payer: MEDICARE

## 2025-04-22 NOTE — TELEPHONE ENCOUNTER
Germaine called this morning. Jose Elias is having tingling his L arm. She is unsure where to find the up/ down arrows to adjust his DBS . Please call her at [phone] (home)

## 2025-04-22 NOTE — TELEPHONE ENCOUNTER
This morning he woke and his L arm was tingling and and tremor was much worse. Wife wanted to lower settings but could not.    It is now resolved and he is OK with DBS settings, does not want to try the old group, no tingling, good tremor control.    PLAN  If DBS needs adjusted I will squeeze him in at any time to give range to go down since I did not give it at last visit    Currently happy with settings w/o SE so will keep unchanged, unlikely to reoccur if SE would expect it to be constant

## 2025-04-22 NOTE — PROGRESS NOTES
Speech-Language Pathology    { SLP ALL NOTES:[id number]}    Current Problem:          ICD-10-CM    Parkinson's disease (Multi) G20.A1            Hypophonia R49.8              Dysphagia, unspecified type     R13.10                 SLP Assessment:  SLP TX Intervention Outcome: Making Progress Towards Goals  Treatment Tolerance: Patient tolerated treatment well       Plan:  SLP TX Plan: Continue Plan of Care  SLP Frequency: 2x per week  Discussed POC: Patient  Discussed Risks/Benefits: Yes, Patient  Patient/Caregiver Agreeable: Yes      Subjective   Patient arrived to the session with his wife and attended the session independently. Patient appeared to be in a good mood and remained engaged throughout the session. He is excited to see family over the weekend. ***    General Visit Information:  Certification Period Start Date: 3/27/2025  Certification Period End Date: 2025  Number of Authorized Treatments : Based on MN (MCR A&B; no auth; no copay)  Total Number of Visits : 3 ***      Pain Assessment:  Pain Assessment  Pain Assessment: 0-10  0-10 (Numeric) Pain Score: 0 - No pain      Objective   GOALS: Start date: 3/27/2025; anticipated end/re-eval date: 2025  By discharge:  LT. Patient will tolerate the least restrictive diet without clinical s/s aspiration while maintaining adequate nutrition/hydration.  >GOAL STATUS: Ongoing    2. Patient will improve coordination between the subsystems of respiration, phonation, and articulation for functional speech production with a variety of communication partners across environmental and situational contexts.   >GOAL STATUS: Ongoing    STGs:  1. Patient will tolerate the least restrictive diet without clinical s/s aspiration with 100% of PO while maintaining adequate nutrition/hydration.  >ADDRESSED: Patient observed to take sips of water during voice exercises. There was x1 instance where patient coughed shortly after taking a sip of water, unable to rule out  s/s of aspiration. Patient with wet vocal quality intermittently throughout the session. ***  >GOAL STATUS: Ongoing; progressing    2. Patient will utilize recommended safe swallow strategies with 90% of PO consumption opportunities given minimal verbal and/or visual cueing.  >ADDRESSED: Patient observed to take small sips of thin liquid (water) via cup edge independently following initial instruction. ***  >GOAL STATUS: Ongoing; making progress    3. Patient will perform pharyngeal/laryngeal strengthening exercises with 90% accuracy given minimal verbal and/or visual cueing.   >NOT ADDRESSED.  >GOAL STATUS: Ongoing    4. Patient will increase vocal loudness to 70 dB with mild verbal cues/models during sustained phonation.  >ADDRESSED: Following initial instruction and clinician model, patient averaged *** dB SPL (prev. 64.1) for sustained phonation give max cueing. For high /a/, patient averaged *** dB SPL (prev. 67.2), for low /a/ *** (prev. 64.3) given max cueing after initial instruction and clinician model. Note that patient with little variability in lower pitch. Goal modified due to difficulty nearing the goal of 75 dB SPL.  >GOAL STATUS: Modified 4/15, ongoing; making progress    5. Patient will maintain max phonation time of >=10 seconds with mild verbal cues/models during sustained phonation.  >ADDRESSED: Patient cued to stop when volume dropped significantly during sustained phonation (typically after about 12 seconds). Patient averaged *** seconds (prev. 7.1). Patient able to hold much longer than reported value, but system stopped recording his trial when he dropped below 60 dB SPL. ***  >GOAL STATUS: Ongoing; making progress    6. Patient will increase vocal loudness to 70 dB with mild verbal cues/models during reading at word and sentence levels.  >ADDRESSED: Following initial instruction and clinician model, patient averaged *** dB SPL (prev. 66.9) for 10 functional phrases and sentences given max  assistance. Patient rates his effort to produce this loudness level an 8/10.***  >GOAL STATUS: Ongoing; making progress    7. Patient will increase vocal loudness to 67 dB with mild verbal cues/models to communicate effectively for paragraph level reading.  >NOT ADDRESSED.  >GOAL STATUS: Ongoing    8. Patient will increase vocal loudness to 65 dB with mild verbal cues/models to communicate effectively during structured speech production.  >ADDRESSED: Following initial instruction and clinician model, patient averaged *** dB SPL (prev. 66.8) for a [employer] activity given max cues.  >GOAL STATUS: Ongoing; making progress      Outpatient Education:  Adult Outpatient Education  Individual(s) Educated: Patient  Written Education : LSVT based HEP  Verbal Education : feedback on session performance  Risk and Benefits Discussed with Patient/Caregiver/Other: yes  Patient/Caregiver Demonstrated Understanding: yes  Plan of Care Discussed and Agreed Upon: yes  Patient Response to Education: Patient/Caregiver Verbalized Understanding of Information, Patient/Caregiver Asked Appropriate Questions

## 2025-04-22 NOTE — PROGRESS NOTES
Speech-Language Pathology                 Therapy Communication Note    Patient Name: Thomas Frankel  MRN: [id number]  Today's Date: 4/22/2025     Discipline: Speech Language Pathology    Missed Visit Reason: Patient cancelled his scheduled Speech Therapy appointment this date.    Missed Time: Cancel

## 2025-04-23 ENCOUNTER — DOCUMENTATION (OUTPATIENT)
Dept: NEUROLOGY | Facility: CLINIC | Age: 75
End: 2025-04-23
Payer: MEDICARE

## 2025-04-23 NOTE — PROGRESS NOTES
Comes to office with wife as I did not give room to increase or decrease last visit.     Tingling comes and goes    LUE was severe tremor and tingling for 3hrs straight and then resolved.     R STN on New Group  increased to 5.0 in program 1 without SE, program 2 already at 5.0 so left.   Patient limits 0-5.1 in pro 1    L STN also gave range since tremor in RUE 0-3.6

## 2025-04-24 ENCOUNTER — TELEPHONE (OUTPATIENT)
Dept: NEUROLOGY | Facility: CLINIC | Age: 75
End: 2025-04-24
Payer: MEDICARE

## 2025-04-24 ENCOUNTER — APPOINTMENT (OUTPATIENT)
Dept: SPEECH THERAPY | Facility: CLINIC | Age: 75
End: 2025-04-24
Payer: MEDICARE

## 2025-04-24 ENCOUNTER — DOCUMENTATION (OUTPATIENT)
Dept: SPEECH THERAPY | Facility: CLINIC | Age: 75
End: 2025-04-24
Payer: MEDICARE

## 2025-04-24 NOTE — PROGRESS NOTES
Speech-Language Pathology                 Therapy Communication Note    Patient Name: Thomas Frankel  MRN: [id number]  Today's Date: 4/24/2025     Discipline: Speech Language Pathology    Missed Visit Reason: Patient cancelled his scheduled Speech Therapy appointment this date via [employer].    Missed Time: Cancel

## 2025-04-24 NOTE — TELEPHONE ENCOUNTER
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 So new battery but no other new technology.     Percept    [id number]- Left  [id number]    Thanks!

## 2025-04-24 NOTE — TELEPHONE ENCOUNTER
Germaine called this morning. She was talking with her son Joo ( who she states is very bright) about Jose Elias's DBS. They are investigating if Jose Elias was accidentally implanted with Brainsence and they are unsure if he has brainsence capability . His battery was recently changed to Precept RC and Joo feels that since battery change his programming has been suboptimal. ( Jose Elias has had 2 programming session and she feels this not require multiple programming's to maximize his DBS benifits. He would like to do his own investigation and is requesting the model & serial numbers for his fathers system.

## 2025-04-30 ENCOUNTER — TELEPHONE (OUTPATIENT)
Dept: NEUROLOGY | Facility: CLINIC | Age: 75
End: 2025-04-30
Payer: MEDICARE

## 2025-04-30 ENCOUNTER — APPOINTMENT (OUTPATIENT)
Dept: SPEECH THERAPY | Facility: CLINIC | Age: 75
End: 2025-04-30
Payer: MEDICARE

## 2025-04-30 NOTE — TELEPHONE ENCOUNTER
I spoke wit wife, he is at 4.0 still and tremoring  She is asking about brainsense and if a problem with a battery.     Explained when to turn up and down--tremors and dyskinesia   She never had problems with DBS before so upsetting  She notes he had tremors in LUE all night (tremors per usual)    PLAN  Increase DBS back to 4.2,if needed go to 4.4 and he has room to slowly after that go to 5.0    If needed and maxed out on settings could do a programming session

## 2025-04-30 NOTE — TELEPHONE ENCOUNTER
Germaine called. Jose Elias was having tremors in his Left arm at 4.2 so she turned him down to 4.0. He continues to have even worse tremors at 4.0 thru the night. She would like to know how to fix his tremor. I did mention that you were very busy in clinic today as she asked if she can just bring him in like last time. Please call her at [phone] (home)

## 2025-05-02 ENCOUNTER — APPOINTMENT (OUTPATIENT)
Dept: SPEECH THERAPY | Facility: CLINIC | Age: 75
End: 2025-05-02
Payer: MEDICARE

## 2025-05-05 NOTE — TELEPHONE ENCOUNTER
Germaine called this morning. She would like to update you on the weekend. The L arm is at 4.5 still having significant tremor. Take 1.5 hours for meds to kick in ( only has 1 hour of on time) then wearing off 1-2 hours before next dose. She remains concerned that the new battery is not compatible with his 14 year old leads.

## 2025-05-05 NOTE — TELEPHONE ENCOUNTER
Can you please schedule a 2hr OFF meds programming with me? It is certainly compatible, need some more in depth programming. In the meantime can increase to the max setting as tolerated. Thank you

## 2025-05-14 ENCOUNTER — APPOINTMENT (OUTPATIENT)
Dept: NEUROLOGY | Facility: CLINIC | Age: 75
End: 2025-05-14
Payer: MEDICARE

## 2025-05-14 VITALS
WEIGHT: 213 LBS | SYSTOLIC BLOOD PRESSURE: 158 MMHG | RESPIRATION RATE: 18 BRPM | DIASTOLIC BLOOD PRESSURE: 89 MMHG | BODY MASS INDEX: 28.89 KG/M2 | HEART RATE: 114 BPM

## 2025-05-14 DIAGNOSIS — G20.A2 PARKINSON'S DISEASE WITHOUT DYSKINESIA, WITH FLUCTUATING MANIFESTATIONS: Primary | ICD-10-CM

## 2025-05-14 DIAGNOSIS — R13.10 DYSPHAGIA, UNSPECIFIED TYPE: ICD-10-CM

## 2025-05-14 PROCEDURE — 95983 ALYS BRN NPGT PRGRMG 15 MIN: CPT | Performed by: NURSE PRACTITIONER

## 2025-05-14 PROCEDURE — 95984 ALYS BRN NPGT PRGRMG ADDL 15: CPT | Performed by: NURSE PRACTITIONER

## 2025-05-14 PROCEDURE — 99214 OFFICE O/P EST MOD 30 MIN: CPT | Performed by: NURSE PRACTITIONER

## 2025-05-14 RX ORDER — CARBIDOPA AND LEVODOPA 52.5; 21 MG/1; MG/1
2 CAPSULE, EXTENDED RELEASE ORAL 3 TIMES DAILY
Qty: 180 EACH | Refills: 3 | Status: CANCELLED | OUTPATIENT
Start: 2025-05-14

## 2025-05-14 ASSESSMENT — UNIFIED PARKINSONS DISEASE RATING SCALE (UPDRS)
RIGIDITY_RLE: 2
DYSKINESIAS_PRESENT: NO
RIGIDITY_LUE: 3
FINGER_TAPPING_RIGHT: 0
TOETAPPING_RIGHT: 2
AMPLITUDE_RUE: 1
SPEECH: 2
FACIAL_EXPRESSION: 3
FINGER_TAPPING_LEFT: 0
GAIT: 2
AMPLITUDE_LUE: 3
HANDMOVEMENTS_RIGHT: 1
KINETIC_TREMOR_LEFTHAND: 1
PARKINSONS_MEDS: YES
RIGIDITY_RUE: 2
AMPLITUDE_RLE: 0
LEVODOPA: YES
CHAIR_RISING_SCALE: 1
LEG_AGILITY_LEFT: 3
KINETIC_TREMOR_RIGHTHAND: 1
POSTURAL_STABILITY: 0
TOETAPPING_LEFT: 3
CLINICAL_STATE: OFF
TOTAL_SCORE: 51
AMPLITUDE_LLE: 0
POSTURAL_TREMOR_LEFTHAND: 2
PRONATION_SUPINATION_LEFT: 3
CONSTANCY_TREMOR_ATREST: 4
PRONATION_SUPINATION_RIGHT: 0
SPONTANEITY_OF_MOVEMENT: 3
POSTURE: 2
LEG_AGILITY_RIGHT: 2
POSTURAL_TREMOR_RIGHTHAND: 0
MINUTES_SINCE_LEVODOPA: 9HR
AMPLITUDE_LIP_JAW: 0
RIGIDITY_LLE: 2
FREEZING_GAIT: 0
RIGIDITY_NECK: 2

## 2025-05-14 ASSESSMENT — PATIENT HEALTH QUESTIONNAIRE - PHQ9
1. LITTLE INTEREST OR PLEASURE IN DOING THINGS: NOT AT ALL
SUM OF ALL RESPONSES TO PHQ9 QUESTIONS 1 AND 2: 0
2. FEELING DOWN, DEPRESSED OR HOPELESS: NOT AT ALL

## 2025-05-14 ASSESSMENT — ENCOUNTER SYMPTOMS
LOSS OF SENSATION IN FEET: 0
DEPRESSION: 0
OCCASIONAL FEELINGS OF UNSTEADINESS: 1

## 2025-05-14 NOTE — PATIENT INSTRUCTIONS
"#We discussed trying Crexont to help with wearing off and less frequent dosing     #You are going home on \"new group\" , new settings for left side of body (R chest IPG/battery)    You can always try \"new group #2\"    You can always go back to \"Old group\"  settings you came in on today if you have any side effects     \"baseline\"is your old settings you were on before we made any changes.     If side effects just on one side, then OK to lower that side a click (0.1v) every few mins until resolves.    You have the ability to increase the amplitude on right or left side as needed for symptom improvement: tremor, stiffness, slowness of movement.    You can increase by 0.1V every few days for one side of your body as needed. Increasing one side at a time allows you to notice if side effect with that increase BEFORE you adjust the other side.   You can also decrease if any side effects at any time (dyskinesia or other).    You can TURN OFF the DBS at any time if any concern for side effect/worsening symptoms.    #Continue meds unchanged      Med Dose   Stalevo 150mg  1 tab 6 x/day  (prescribed 5x/day)   Donepezil 10mg 1 tab in the am   Fludrocortisone 0.1mg 1 tab daily     #Follow up as scheduled with me on a day when Dr. Sutton is here.     Kam Chamorro, NP-C  Adult/Gerontological Nurse Practitioner   [facility], Department of Neurology  Neurological [place]  [facility]  [address] [place] [address]  Phone: [phone]  Fax: [phone]    "

## 2025-05-14 NOTE — PROGRESS NOTES
"Subjective     Thomas Frankel is a 74 y.o. year old male who presents with Parkinson's Disease, here for follow up visit.    HPI  Last visit  with me 4/21/25:  #I sent message to Dr. Glass re: DBS precautions for colonoscopy.  #Offered GI consult for constipation  #You are going home on \"new group\" , new settings for left side of body (R chest IPG/battery)  You can always go back to \"baseline\" settings you came in on today  \"Old group\" is your old settings you were on before we made any changes.   #Medications we can consider Rytary, Crexont or in the near future Vyalev (the subcutaneous pump we discussed)   #Continue speech therapy ordered  #Continue to increase melatonin as needed  #Continue meds unchanged    Med Dose   Stalevo 150mg  1 tab 6 x/day  (prescribed 5x/day)   Donepezil 10mg 1 tab in the am   Fludrocortisone 0.1mg 1 tab daily     #Follow up in 3M with me for DBS 1 hr on a day when Dr. Sutton is here.       He comes with wife.    They are both very worried and anxious.     Last visit adjusted DBS as his LUE tremor has been worse since IPG replaced from voltage to constant current.   More wearing off so only 2hrs of tremor relief per dose. Does not really notice the other PD sx.     Constipation still an issue.    Stopped speech therapy as he was not continuing. Had ENT visit 3/13/25 and started on omeprazole and will follow up.    More comfortable at home, due to urinary issues, things    MOTOR SYMPTOMS      +/-                            Comments  Motor sx overall  worse   Tremor + LUE worse as above   Rigidity -    Bradykinesia -    Balance/gait + Cane all the time    Hx:  L ankle frx makes L foot turn out (he had a plate/surgery)   FOG -    Falls -    PT - Last 1 yr ago   Exercise - [employer] on hold x 3M after IPG replacement       Social  Lives with: wife  Help with ADLs: wife drives, does meds and recharges his IPG, he does all ADLs, even does chores at home         [facility] " Meds  Med Dose Time   Stalevo 150mg  1 tab 6 x/day  (prescribed 5x/day) ([id number])    q4hrs    Taking middle of the night dose 2am- states he has been doing it for so long he is unsure if he neds it--body wakes him up    Bedtime is 8-9pm    Donepezil 10mg 1 tab in the evening    Fludrocortisone 0.1mg 1 tab daily    Melatonin 6mg nightly--will increase if not helping since just started    Latency 1hr    Wearing off 30mins prior intense shaking before but now 2hrs    Side effects     Dyskinesia None    Hallucinations None    Other None        Prior meds:  Zoloft years ago-not sure if helpful    Prior workup  B12 277 2/27/25--started replacement  TSH 0.96--2/28/25    Chronic LBP, hx of multiple surgeries, previously followed by pain management, last visit without weakness on exam, denies paresthesias. Declines to see [facility] management.    CT head 7/26/23:   Impression   1. Stable appearance of bilateral deep brain stimulator leads  atelectasis.  2. The DBS generator is not visualized, but no inflammatory changes  noted along the visualized DBS leads.  3. No acute intracranial hemorrhage, mass effect, or CT apparent  acute infarct. No abnormal enhancement       Patient Health Questionnaire-2 Score: 0            Current Outpatient Medications:     acetaminophen (Tylenol) 325 mg tablet, Take by mouth every 4 hours., Disp: , Rfl:     atorvastatin (Lipitor) 20 mg tablet, Take 1 tablet (20 mg) by mouth once daily., Disp: 90 tablet, Rfl: 3    b complex vitamins capsule, Take 1 capsule by mouth once daily., Disp: , Rfl:     carbidopa-levodopa-entacapone (Stalevo) 37.5-150-200 mg tablet, Take one tablet 5 times a day, Disp: 150 tablet, Rfl: 11    cholecalciferol (Vitamin D-3) 50 MCG (2000 UT) tablet, Take 1 tablet (2,000 Units) by mouth once daily., Disp: , Rfl:     cyanocobalamin (Vitamin B-12) 1,000 mcg tablet, Take 1 tablet (1,000 mcg) by mouth once daily., Disp: , Rfl:     donepezil (Aricept) 10 mg tablet,  Take 1 tablet (10 mg) by mouth once daily at bedtime., Disp: , Rfl:     fludrocortisone (Florinef) 0.1 mg tablet, Take 1 tablet (0.1 mg) by mouth once daily in the morning. Take before meals., Disp: 90 tablet, Rfl: 3    MAGNESIUM CITRATE ORAL, Take 400 mg by mouth once daily., Disp: , Rfl:     melatonin 5 mg capsule, Take 1.2 capsules (6 mg) by mouth once daily at bedtime. Starting 2/24/25, take 6 mg nightly x 2 weeks, then may increase to 9 mg nightly, Disp: , Rfl:     Myrbetriq 25 mg tablet extended release 24 hr 24 hr tablet, Take 2 tablets (50 mg) by mouth once daily., Disp: , Rfl:     omeprazole (PriLOSEC) 40 mg DR capsule, TAKE 1 CAPSULE (40 MG) BY MOUTH ONCE DAILY IN THE MORNING. TAKE BEFORE MEALS. DO NOT CRUSH OR CHEW., Disp: 90 capsule, Rfl: 1    oxyCODONE (Roxicodone) 5 mg immediate release tablet, Take 1 tablet (5 mg) by mouth every 6 hours if needed for severe pain (7 - 10). (Patient not taking: Reported on 4/16/2025), Disp: 21 tablet, Rfl: 0    polyethylene glycol (Glycolax, Miralax) 17 gram packet, Take 17 g by mouth once daily., Disp: 20 each, Rfl: 0    sennosides-docusate sodium (Carmela-Colace) 8.6-50 mg tablet, Take 1 tablet by mouth once daily., Disp: 20 tablet, Rfl: 0       Objective   Vitals:    05/14/25 0856 05/14/25 0857   BP: (!) 190/105 158/89   BP Location: Right arm Right arm   Patient Position: Sitting Standing   BP Cuff Size: Large adult Large adult   Pulse: 106 (!) 114   Resp: 18    Weight: 96.6 kg (213 lb)                            Physical Exam    MDS UPDRS 1st Score: Motor Examination  Is the patient on medication for treating the symptoms of Parkinson's Disease?: Yes  Patients receiving medication for treating the symptoms of Parkinson's Disease, shade the patient's clinical state.: Off  Is the patient on Levodopa?: Yes  Minutes since last Levodopa dose: 9hr  Speech: 2  Facial Expression: 3  Rigidty Neck: 2  Rigidty RUE: 2  Rigidity - LUE: 3  Rigidity RLE: 2  Rigidity LLE: 2  Finger  "Tapping Right Hand: 0  Finger Tapping Left Hand: 0  Hand Movements- Right Hand: 1  Hand Movements- Left Hand: 1  Pronatiaon-Supination Movments - Right Hand: 0  Pronatiaon-Supination Movments Left Hand: 3  Toe Tapping Right Foot: 2  Toe Tapping - Left Foot: 3  Leg Agility - Right Le  Leg Agility - Left leg: 3  Arising from Chair: 1  Gait: 2  Freezing of Gait: 0  Postural Stability: 0 (not tested)  Posture: 2  Global Spontanteity of Movment ( Body Bradykinesia): 3  Postural Tremor - Right Hand: 0  Postural Tremor - Left hand: 2  Kinetic Tremor - Right hand: 1  Kinetic Tremor - Left hand: 1  Rest Tremor Amplitude - RUE: 1  Rest Tremor Amplitude - LUE: 3  Rest Tremor Amplitude - RLE: 0  Rest Tremor Amplitude - LLE: 0  Rest Tremor Amplitude - Lip/Jaw: 0  Constancy of Rest Tremor: 4  MDS UPDRS Total Score: 51  Were dyskinesias (chorea or dystonia) present during examination?: No      DBS procedure  [employer] RC 3/6/25  L chest IPG/ L STN    Initial Settings:  L STN  C+2-3- 3.2/90/185 (0-3.5)    Programming:  None    Final Settings:  L STN  C+2-3- 3.2/90/185 (0-3.5)    [employer] RC 3/6/25  R chest IPG/ R STN  IP%  Impedances all OK     Initial Settings  New Group  R STN  1+2- 4.7/70/125    2+3- 5.0/80/125     Programming:  Marked worsening of tremor with DBS off---also LLE 2+ and LUE 4+    New group tried increasing the amp to 5.0/5.0 and tremor improved. Increased PW to 100 and resting tremor resolves--1+ kinetic tremor.  Both programs increased PW up to 120 without SE but does have breakthrough tremor.     Some difficulty describing SE and gave some time between adjusting settings, made some programming difficult.   Slowly adjusted PW and amp as tolerated.    C+1-2- 1.5/1.5 and L facial contraction    0+1- 4.3/90/125  2-3+4.5/80/125  Higher settings improved tremor but felt \"funny\" and above settings did not improve tremor    1+2-3- 4.5/110/125  2+3- 5.0/120/125  No tremor but feels lightheaded " and lowered Pro 1 PW to 100 and worsens tremor    Final Settings:  Group C--new group  R STN  1+2- 4.0/70/125    2+3- 5.0/80/125     Group B-old group   R STN  2+3- 6.3/110/185  (0-6.3)    Old group  R STN  2+3- 6.3/90/185  (0-6.3)    --------------Prior programming----------------------------  4/21/25 of R STN  Initial Settings  Group B  R STN  2+3- 6.3/110/185  (0-6.3)     Programming:  Marked worsening of tremor with DBS off.      Interleaving  C+ 2- 2.5/60/125---cannot go higher d/t paresthesia--PW >70 without improvement and SE at 80 (pulling)  C+3- 2.5/60/125---lowered to 2.3 d/t paresthesia     Interleaving---made group C  1+2- 4.0/70/125 ((lower worsens tremor))  2+3- 5.0/80/125 ((lower caused tremors))--had some breakthrough so at PW 90 improved      In the future if needed:  Continue adjustment of group C  Double monopolar vs double monopolar interleaving     Final Settings:  Group C--new group  R STN  1+2- 4.0/70/125    2+3- 5.0/80/125      Group B-old group   R STN  2+3- 6.3/110/185  (0-6.3)     Old group  R STN  2+3- 6.3/90/185  (0-6.3)      Assessment/Plan   Mr. Thomas Frankel is a 74 y.o. M with PD (dx 1994, s/p bl STN [employer] DBS in 2006), cognitive decline (one donepezil). Patient looks overall motorically good 30 years into PD on Stalevo, tolerating without SE, though frequent dosing including at 2am--tried stopping but could not tolerate. IPGs replaced to Percept 3/6/25 with worsening of motor sx, DBS adjusted last visit and still with bothersome LUE tremor.     He and wife feel he will never feel the same since surgery for IPG replacement.     PD: LUE tremor worse, more wearing off  DBS adjusted with excellent improvement again and some breakthrough tremor  Did discuss worsening of tremor control with IPG change with Dr. Sutton, continue as is  Consider Rytary, Crexont or Vyalev in the future   Most interested in Crexont, not Vyalev but does not want to make adjustments just yet   Dysphagia:  "has seen ENT and doing ST he declined to continue  Found to have GERD, started on PPI  Constipation: offered GI c/s for meds, declines      Diagnoses and all orders for this visit:  Parkinson's disease without dyskinesia, with fluctuating manifestations  Dysphagia, unspecified type    #We discussed trying Crexont to help with wearing off and less frequent dosing     #You are going home on \"new group\" , new settings for left side of body (R chest IPG/battery)    You can always try \"new group #2\"    You can always go back to \"Old group\"  settings you came in on today if you have any side effects     \"baseline\"is your old settings you were on before we made any changes.     If side effects just on one side, then OK to lower that side a click (0.1v) every few mins until resolves.    You have the ability to increase the amplitude on right or left side as needed for symptom improvement: tremor, stiffness, slowness of movement.    You can increase by 0.1V every few days for one side of your body as needed. Increasing one side at a time allows you to notice if side effect with that increase BEFORE you adjust the other side.   You can also decrease if any side effects at any time (dyskinesia or other).    You can TURN OFF the DBS at any time if any concern for side effect/worsening symptoms.    #Continue meds unchanged      Med Dose   Stalevo 150mg  1 tab 6 x/day  (prescribed 5x/day)   Donepezil 10mg 1 tab in the am   Fludrocortisone 0.1mg 1 tab daily     #Follow up as scheduled with me on a day when Dr. Sutton is here.           Total time of 90 minutes for today's visit, of which 60 mins were spent with DBS programming as noted above-checking settings, stimulator events, energy level, and impedances and updating settings in the chart.  I, JERALD Chamorro, personally performed  a medically appropriate exam, discussion of care/treatment options for the remaining time.       Kam Chamorro, NP-C  Adult/Gerontological Nurse " Practitioner   [facility], Department of Neurology  [facility]  [facility]  [address] [place] [address]  Phone: [phone]  Fax: [phone]

## 2025-05-27 ENCOUNTER — APPOINTMENT (OUTPATIENT)
Dept: OTOLARYNGOLOGY | Facility: CLINIC | Age: 75
End: 2025-05-27
Payer: MEDICARE

## 2025-05-29 NOTE — PROGRESS NOTES
[facility]   Neurosurgery    Diagnosis  Kieran was seen today for follow-up.  Diagnoses and all orders for this visit:  Parkinson's disease with dyskinesia and fluctuating manifestations  Status post deep brain stimulator placement      Patient Discussion/Summary  Today we discussed wound care and activity restrictions. You may begin to increase your activity, as tolerated.   We discussed recharging plans for if you are out of town.  Continue to follow up with neurology for programming your device. We discussed that we can consider switching back out your generators, down the line, should you continue to have suboptimal relief of your symptoms with further programming efforts.     Provider Impressions  74 y.o. right-handed male with PD diagnosed at age 41, with symptoms of severe tremors, s/p awake b/l STN DBS w/ MDT in 9/2006 by Dr. Laura Simms at [facility]; also, with cognitive decline. Patient transitioned his care to  neurology, Dr. Sutton, in 2023. He had previously undergone b/l DBS generator replacement approx every 1.5-2 years, last on 5/9/23 by Dr. Herve Pelayo, complicated by L incisional leakage requiring washout. He presented to me with his generators again at [facility] and in need of replacement, as Dr. Pelayo is no longer with the system, and underwent b/l DBS generator replacement with MDT Percept  on 3/6/25. Following surgery, patient has not had the same degree of relief of his symptoms as with his old generators, particularly in the LUE, which is slowly improving with further programming. He is scheduled for his next programming visit with Dr. Sutton on 7/23, and will continue to monitor his progress. We discussed that we can consider switching out his generators, should he continue to have suboptimal benefit, despite continued programming. His incisions continue to heal well, and he may begin to increase his activity as tolerated.       History of Present Illness  Chief Complaint:   Chief  Complaint   Patient presents with    Follow-up         HPI: Thomas Frankel is a 74 y.o. right-handed male with PD diagnosed at age 41, with symptoms of severe tremors, s/p awake b/l STN DBS w/ MDT in 9/2006 by Dr. Laura Simms at [facility]; also, with cognitive decline. Patient transitioned his care to  neurology, Dr. Sutton, in 2023. He had previously undergone b/l DBS generator replacement approx every 1.5-2 years, last on 5/9/23 by Dr. Herve Pelayo, complicated by L incisional leakage requiring washout. He presented to me with his generators again at [facility] and in need of replacement, as Dr. Pelayo is no longer with the system, and underwent b/l DBS generator replacement with MDT Percept  on 3/6/25. During his postop visit with me on 4/4/25, patient was doing overall well, after a recent programming visit with Kam, and denied any complaints. Patient presents today for follow up wound check and to discuss his progress.     Patient presents today with his wife. Since their last visit, she reports that they have had several more programming visits with Kam due to his symptoms not being as well controlled since his generator replacement, particularly of his symptoms in the LUE. She states that following his most recent programming visit on 5/14, Kam spent a notable amount of time and completely reprogrammed his device, which has notably improved his symptoms, but still feels as though perhaps his LUE tremor is ~20% less controlled than prior to replacement. He states that his RUE feels relatively stable. At this time, they think this is likely continuing to slowly improve, and will monitor. They deny issues with charging the device. Regarding his incisions, they feel that these have well healed, and they are inquiring about him resuming [employer].       ROS: As noted in HPI.      Previous History  Medical History[1]  Surgical History[2]  Social History[3]  Family History[4]  Allergies[5]  Current Outpatient  "Medications   Medication Instructions    acetaminophen (Tylenol) 325 mg tablet Every 4 hours RT    atorvastatin (LIPITOR) 20 mg, oral, Daily    b complex vitamins capsule 1 capsule, Daily    carbidopa-levodopa-entacapone (Stalevo) 37.5-150-200 mg tablet Take one tablet 5 times a day    cholecalciferol (Vitamin D-3) 50 MCG (2000 UT) tablet 1 tablet, Daily    cyanocobalamin (Vitamin B-12) 1,000 mcg tablet 1 tablet, Daily    donepezil (ARICEPT) 10 mg, Nightly    fludrocortisone (FLORINEF) 0.1 mg, oral, Daily before breakfast    MAGNESIUM CITRATE ORAL 400 mg, Daily    melatonin 6 mg, Nightly    Myrbetriq 25 mg tablet extended release 24 hr 24 hr tablet 2 tablets, Daily    omeprazole (PRILOSEC) 40 mg, oral, Daily before breakfast, Do not crush or chew.    oxyCODONE (ROXICODONE) 5 mg, oral, Every 6 hours PRN    polyethylene glycol (GLYCOLAX, MIRALAX) 17 g, oral, Daily    sennosides-docusate sodium (Carmela-Colace) 8.6-50 mg tablet 1 tablet, oral, Daily         Vitals  /85   Pulse 105   Temp 36.3 °C (97.4 °F)   Ht 1.854 m (6' 1\")   Wt 95.3 kg (210 lb)   BMI 27.71 kg/m²       Physical Exam  Well appearing, in no acute distress. Left generator incision with scant scab, otherwise both incisions well healed.                             [1]   Past Medical History:  Diagnosis Date    Alcohol use disorder, moderate, dependence (Multi) 10/23/2020    Anxiety     Cognitive decline     Dysphagia 01/09/2025    modified barrium swallow WNL    Gait disorder     GERD (gastroesophageal reflux disease)     improved, no meds    Hearing aid worn     History of hepatitis 1969    thinks it was Hep A    Incontinence of urine     Major depressive disorder, recurrent, unspecified 10/23/2020    Parkinson disease (Multi)     Spinal stenosis    [2]   Past Surgical History:  Procedure Laterality Date    ANKLE FRACTURE SURGERY Left 2020    w/ instrumentation    CATARACT EXTRACTION, BILATERAL      DEEP BRAIN STIMULATOR PLACEMENT  2006    DEEP " BRAIN STIMULATOR PLACEMENT      battery changes Q 18 months    INSERTION / REMOVAL CRANIAL DBS GENERATOR  05/09/2023    generator change    LAMINECTOMY  1999    LUMBAR LAMINECTOMY  2000    revision    RIB FRACTURE SURGERY  07/26/2022    Rib fracture repair    TONSILLECTOMY      as a child    WISDOM TOOTH EXTRACTION      WOUND DEBRIDEMENT  07/27/2023    DBS incision   [3]   Social History  Tobacco Use    Smoking status: Never    Smokeless tobacco: Never   Vaping Use    Vaping status: Never Used   Substance Use Topics    Alcohol use: Yes     Alcohol/week: 4.0 standard drinks of alcohol     Types: 4 Shots of liquor per week    Drug use: Never   [4]   Family History  Problem Relation Name Age of Onset    Leukemia Mother Doris Frankel     Blood Disorder Mother Doris Frankel     Heart disease Father      Breast cancer Sister      Brain cancer Brother      Hypertension Brother      Colon cancer Neg Hx     [5] No Known Allergies

## 2025-05-30 ENCOUNTER — OFFICE VISIT (OUTPATIENT)
Dept: NEUROSURGERY | Facility: CLINIC | Age: 75
End: 2025-05-30
Payer: MEDICARE

## 2025-05-30 VITALS
TEMPERATURE: 97.4 F | DIASTOLIC BLOOD PRESSURE: 85 MMHG | HEART RATE: 105 BPM | BODY MASS INDEX: 27.83 KG/M2 | WEIGHT: 210 LBS | SYSTOLIC BLOOD PRESSURE: 136 MMHG | HEIGHT: 73 IN

## 2025-05-30 DIAGNOSIS — Z96.89 STATUS POST DEEP BRAIN STIMULATOR PLACEMENT: ICD-10-CM

## 2025-05-30 DIAGNOSIS — G20.B2 PARKINSON'S DISEASE WITH DYSKINESIA AND FLUCTUATING MANIFESTATIONS: Primary | ICD-10-CM

## 2025-05-30 PROCEDURE — 3079F DIAST BP 80-89 MM HG: CPT | Performed by: NEUROLOGICAL SURGERY

## 2025-05-30 PROCEDURE — 99211 OFF/OP EST MAY X REQ PHY/QHP: CPT | Performed by: NEUROLOGICAL SURGERY

## 2025-05-30 PROCEDURE — 3075F SYST BP GE 130 - 139MM HG: CPT | Performed by: NEUROLOGICAL SURGERY

## 2025-05-30 PROCEDURE — 1125F AMNT PAIN NOTED PAIN PRSNT: CPT | Performed by: NEUROLOGICAL SURGERY

## 2025-05-30 PROCEDURE — 1036F TOBACCO NON-USER: CPT | Performed by: NEUROLOGICAL SURGERY

## 2025-05-30 PROCEDURE — 1159F MED LIST DOCD IN RCRD: CPT | Performed by: NEUROLOGICAL SURGERY

## 2025-05-30 ASSESSMENT — PAIN SCALES - GENERAL: PAINLEVEL_OUTOF10: 5

## 2025-07-02 ENCOUNTER — TELEPHONE (OUTPATIENT)
Dept: NEUROSURGERY | Facility: HOSPITAL | Age: 75
End: 2025-07-02
Payer: MEDICARE

## 2025-07-02 NOTE — TELEPHONE ENCOUNTER
Received message from patient's wife regarding suboptimal relief of left-sided PD symptoms since DBS generator replacement on 3/6/25. She states that following the last programming visit with Kam on 5/14, patient had notable improvement, but has since again worsened and is having increasing wearing off of medications. They are scheduled for programming visit with Kam tomorrow, and with Kam/ on 7/23. She will schedule a visit with Dr. Davila, following those visit, to discuss their progress and next steps.

## 2025-07-03 ENCOUNTER — PROCEDURE VISIT (OUTPATIENT)
Dept: NEUROLOGY | Facility: HOSPITAL | Age: 75
End: 2025-07-03
Payer: MEDICARE

## 2025-07-03 VITALS
SYSTOLIC BLOOD PRESSURE: 119 MMHG | BODY MASS INDEX: 27.83 KG/M2 | WEIGHT: 210 LBS | DIASTOLIC BLOOD PRESSURE: 67 MMHG | HEIGHT: 73 IN | HEART RATE: 99 BPM | RESPIRATION RATE: 18 BRPM | TEMPERATURE: 97.4 F

## 2025-07-03 DIAGNOSIS — G20.A2 PARKINSON'S DISEASE WITHOUT DYSKINESIA, WITH FLUCTUATING MANIFESTATIONS: Primary | ICD-10-CM

## 2025-07-03 DIAGNOSIS — K59.00 CONSTIPATION, UNSPECIFIED CONSTIPATION TYPE: ICD-10-CM

## 2025-07-03 DIAGNOSIS — R13.10 DYSPHAGIA, UNSPECIFIED TYPE: ICD-10-CM

## 2025-07-03 PROCEDURE — 99215 OFFICE O/P EST HI 40 MIN: CPT | Performed by: NURSE PRACTITIONER

## 2025-07-03 PROCEDURE — 99215 OFFICE O/P EST HI 40 MIN: CPT | Mod: 25 | Performed by: NURSE PRACTITIONER

## 2025-07-03 ASSESSMENT — UNIFIED PARKINSONS DISEASE RATING SCALE (UPDRS)
GAIT: 1
AMPLITUDE_RLE: 0
RIGIDITY_RUE: 0
TOETAPPING_LEFT: 3
RIGIDITY_NECK: 0
FINGER_TAPPING_LEFT: 0
AMPLITUDE_LIP_JAW: 0
RIGIDITY_RLE: 0
HANDMOVEMENTS_RIGHT: 1
LEVODOPA: YES
POSTURE: 1
AMPLITUDE_RUE: 0
FINGER_TAPPING_RIGHT: 1
FREEZING_GAIT: 0
AMPLITUDE_LUE: 0
DYSKINESIAS_PRESENT: NO
PARKINSONS_MEDS: YES
SPEECH: 1
POSTURAL_STABILITY: 0
LEG_AGILITY_LEFT: 3
RIGIDITY_LLE: 0
FACIAL_EXPRESSION: 2
PRONATION_SUPINATION_LEFT: 2
KINETIC_TREMOR_RIGHTHAND: 0
POSTURAL_TREMOR_LEFTHAND: 0
PRONATION_SUPINATION_RIGHT: 1
CHAIR_RISING_SCALE: 1
RIGIDITY_LUE: 0
AMPLITUDE_LLE: 0
CLINICAL_STATE: ON
MINUTES_SINCE_LEVODOPA: 3HRS
TOETAPPING_RIGHT: 2
LEG_AGILITY_RIGHT: 3
KINETIC_TREMOR_LEFTHAND: 0
TOTAL_SCORE: 26
POSTURAL_TREMOR_RIGHTHAND: 0
CONSTANCY_TREMOR_ATREST: 0
SPONTANEITY_OF_MOVEMENT: 3

## 2025-07-03 ASSESSMENT — PATIENT HEALTH QUESTIONNAIRE - PHQ9
1. LITTLE INTEREST OR PLEASURE IN DOING THINGS: MORE THAN HALF THE DAYS
SUM OF ALL RESPONSES TO PHQ9 QUESTIONS 1 AND 2: 2
2. FEELING DOWN, DEPRESSED OR HOPELESS: NOT AT ALL

## 2025-07-03 ASSESSMENT — PAIN SCALES - GENERAL: PAINLEVEL_OUTOF10: 0-NO PAIN

## 2025-07-03 NOTE — PROGRESS NOTES
"Subjective     Thomas Frankel is a 74 y.o. year old male who presents with Parkinson's Disease, here for follow up visit.    HPI  Last visit with me 5/14/25:  #We discussed trying Crexont to help with wearing off and less frequent dosing   #You are going home on \"new group\" , new settings for left side of body (R chest IPG/battery)  You can always try \"new group #2\"  You can always go back to \"Old group\"  settings you came in on today if you have any side effects   \"baseline\"is your old settings you were on before we made any changes.   #Continue meds unchanged      Med Dose   Stalevo 150mg  1 tab 6 x/day  (prescribed 5x/day)   Donepezil 10mg 1 tab in the am   Fludrocortisone 0.1mg 1 tab daily     #Follow up as scheduled with me on a day when Dr. Sutton is here.       He comes with wife.   She is very disappointed, never had a problem for 20 yrs and now feels DBS is not helping.  Wife does not think he can be fixed.   Overall well being is not as good since IPG change----------------he does not c/o stiffness or bradykinesia, so we discussed then it would likely be the tremor    He went home on \"new group #2\" and did well initially then wore off about half way from 5/14/25 to today's apt.     L sided tremor remains the issue.  R sided tremor only once in a while.   Getting 1-1.5hrs on time. When off the tremor is worse than he has ever had.   One time both legs and arms and his entire body was shaking--20 mins but only 1 time  \"Has never felt good ever since the surgery\".   Wife wants the device checked out--wife concerned there is a frx.   He is very uncomfortable, waiting around for his medicine.     Denies anxiety, no new stressors    Does not talk as much per wife. Daughter and 3 kids came in and was overwhelming with family.   He sat and watched, though he said he had a good time.   He is depressed but optimistic. PH9 is 2.   More comfortable at home, due to urinary issues but overall better.     Constipation " improved eating more fruit.     Stopped speech therapy as he was not continuing. Had ENT visit 3/13/25 and started on omeprazole and will follow up August.   Denies dysphagia. Will not practice exercise.       MOTOR SYMPTOMS      +/-                            Comments  Motor sx overall  worse   Tremor + LUE worse as above   Rigidity -    Bradykinesia -    Balance/gait + Cane all the time    Hx:  L ankle frx makes L foot turn out (he had a plate/surgery)   FOG -    Falls + Once fell over moving the door to go out on the deck   PT - Last 1 yr ago   Exercise - [employer] on hold x 3M after IPG replacement    Apt to be retested       Social  Lives with: wife  Help with ADLs: wife drives, does meds and recharges his IPG, he does all ADLs, even does chores at home         [facility] Meds  Med Dose Time   Stalevo 150mg  1 tab 6 x/day  (prescribed 5x/day) ([id number])    q4hrs    Taking middle of the night dose 2am- states he has been doing it for so long he is unsure if he neds it--body wakes him up--goes to bed early and tired during the day    Bedtime is 8-9pm    Donepezil 10mg 1 tab in the evening    Fludrocortisone 0.1mg 1 tab daily    Melatonin 6mg nightly--will increase if not helping since just started    Latency 30min- 1hr    Wearing off After 1-1.5hrs    However 3hrs past meds today and is doing well, he says wearing off is not every time, just afternoons    Side effects     Dyskinesia None    Hallucinations None    Other None        Prior meds:  Zoloft years ago-not sure if helpful    Prior workup  B12 277 2/27/25--started replacement  TSH 0.96--2/28/25    Chronic LBP, hx of multiple surgeries, previously followed by pain management, last visit without weakness on exam, denies paresthesias. Declines to see [facility] management.    CT head 7/26/23:   Impression   1. Stable appearance of bilateral deep brain stimulator leads  atelectasis.  2. The DBS generator is not visualized, but no  inflammatory changes  noted along the visualized DBS leads.  3. No acute intracranial hemorrhage, mass effect, or CT apparent  acute infarct. No abnormal enhancement       Patient Health Questionnaire-2 Score: 2            Current Outpatient Medications:     acetaminophen (Tylenol) 325 mg tablet, Take by mouth every 4 hours., Disp: , Rfl:     atorvastatin (Lipitor) 20 mg tablet, Take 1 tablet (20 mg) by mouth once daily., Disp: 90 tablet, Rfl: 3    b complex vitamins capsule, Take 1 capsule by mouth once daily., Disp: , Rfl:     carbidopa-levodopa-entacapone (Stalevo) 37.5-150-200 mg tablet, Take one tablet 5 times a day, Disp: 150 tablet, Rfl: 11    cholecalciferol (Vitamin D-3) 50 MCG (2000 UT) tablet, Take 1 tablet (50 mcg) by mouth once daily., Disp: , Rfl:     cyanocobalamin (Vitamin B-12) 1,000 mcg tablet, Take 1 tablet (1,000 mcg) by mouth once daily., Disp: , Rfl:     donepezil (Aricept) 10 mg tablet, Take 1 tablet (10 mg) by mouth once daily at bedtime., Disp: , Rfl:     fludrocortisone (Florinef) 0.1 mg tablet, Take 1 tablet (0.1 mg) by mouth once daily in the morning. Take before meals., Disp: 90 tablet, Rfl: 3    MAGNESIUM CITRATE ORAL, Take 400 mg by mouth once daily., Disp: , Rfl:     melatonin 5 mg capsule, Take 1.2 capsules (6 mg) by mouth once daily at bedtime. Starting 2/24/25, take 6 mg nightly x 2 weeks, then may increase to 9 mg nightly, Disp: , Rfl:     Myrbetriq 25 mg tablet extended release 24 hr 24 hr tablet, Take 2 tablets (50 mg) by mouth once daily., Disp: , Rfl:     omeprazole (PriLOSEC) 40 mg DR capsule, TAKE 1 CAPSULE (40 MG) BY MOUTH ONCE DAILY IN THE MORNING. TAKE BEFORE MEALS. DO NOT CRUSH OR CHEW., Disp: 90 capsule, Rfl: 1    oxyCODONE (Roxicodone) 5 mg immediate release tablet, Take 1 tablet (5 mg) by mouth every 6 hours if needed for severe pain (7 - 10)., Disp: 21 tablet, Rfl: 0    polyethylene glycol (Glycolax, Miralax) 17 gram packet, Take 17 g by mouth once daily., Disp: 20  "each, Rfl: 0    sennosides-docusate sodium (Carmela-Colace) 8.6-50 mg tablet, Take 1 tablet by mouth once daily., Disp: 20 tablet, Rfl: 0       Objective   Vitals:    25 0923   BP: 119/67   Pulse: 99   Resp: 18   Temp: 36.3 °C (97.4 °F)   TempSrc: Temporal   Weight: 95.3 kg (210 lb)   Height: 1.854 m (6' 1\")                             Physical Exam    Walks quickly      MDS UPDRS 1st Score: Motor Examination  Is the patient on medication for treating the symptoms of Parkinson's Disease?: Yes  Patients receiving medication for treating the symptoms of Parkinson's Disease, shade the patient's clinical state.: On  Is the patient on Levodopa?: Yes  Minutes since last Levodopa dose: 3hrs  Speech: 1  Facial Expression: 2  Rigidty Neck: 0  Rigidty RUE: 0 (paratonia in every liimb but overall feels more rigid in LUE and LLE)  Rigidity - LUE: 0  Rigidity RLE: 0  Rigidity LLE: 0  Finger Tapping Right Hand: 1  Finger Tapping Left Hand: 0  Hand Movements- Right Hand: 1  Hand Movements- Left Hand: 1  Pronatiaon-Supination Movments - Right Hand: 1  Pronatiaon-Supination Movments Left Hand: 2  Toe Tapping Right Foot: 2  Toe Tapping - Left Foot: 3  Leg Agility - Right Leg: 3  Leg Agility - Left leg: 3  Arising from Chair: 1  Gait: 1  Freezing of Gait: 0  Postural Stability: 0 (not scheduled)  Posture: 1  Global Spontanteity of Movment ( Body Bradykinesia): 3  Postural Tremor - Right Hand: 0  Postural Tremor - Left hand: 0  Kinetic Tremor - Right hand: 0  Kinetic Tremor - Left hand: 0  Rest Tremor Amplitude - RUE: 0  Rest Tremor Amplitude - LUE: 0  Rest Tremor Amplitude - RLE: 0  Rest Tremor Amplitude - LLE: 0  Rest Tremor Amplitude - Lip/Jaw: 0  Constancy of Rest Tremor: 0  MDS UPDRS Total Score: 26  Were dyskinesias (chorea or dystonia) present during examination?: No      DBS procedure  [employer] Percept RC 3/6/25  L chest IPG/ L STN  IP %  MONISHA 3/2/2040  Impedances all OK    Initial Settings:  L STN  C+2-3- 3.2/90/185 " (0-3.6)    Programming:  None    Final Settings:  L STN  C+2-3- 3.2/ (0-3.6)    [employer] Percept RC 3/6/25  R chest IPG/ R STN  IP%  MONISHA: 3/2/2040  Impedances all OK     Initial Settings  New Group  R STN  1+2- 4.7/125    2+3- 5.0/80125     Programming:  Marked worsening of tremor with DBS off--- LUE 4+when walking (and off balance turning), 2+ resting was intermittent without seeing the LLE     1+2-3- 4.5/110/125  2+3- 5.0/120/125  Today he looked great on this setting    Final Settings:  Group C--new group  R STN  1+2- 4.0/125    2+3- 5.0/     Group B-old group   R STN  2+3- 6.3/185  (0-6.3)    Old group  R STN  2+3- 6.3/  (0-6.3)    --------------Prior programming----------------------------  25 of R STN  Initial Settings  Group B  R STN  2+3- 6.3/185  (0-6.3)     Programming:  Marked worsening of tremor with DBS off.      Interleaving  C+ 2- 2.5/---cannot go higher d/t paresthesia--PW >70 without improvement and SE at 80 (pulling)  C+3- 2.5/60125---lowered to 2.3 d/t paresthesia     Interleaving---made group C  1+2- 4.0/125 ((lower worsens tremor))  2+3- 5.0/80/125 ((lower caused tremors))--had some breakthrough so at PW 90 improved      In the future if needed:  Continue adjustment of group C  Double monopolar vs double monopolar interleaving     Final Settings:  Group C--new group  R STN  1+2- 4.0//125    2+3- 5.0/80/125      Group B-old group   R STN  2+3- 6.3/110/185  (0-6.3)     Old group  R STN  2+3- 6.3/90/185  (0-6.3)    25  DBS procedure  [employer] Percept RC 3/6/25  L chest IPG/ L STN  IP %  MONISHA 3/2/2040  Impedances all OK    Initial Settings:  L STN  C+2-3- 3.2/ (0-3.6)    Programming:  None    Final Settings:  L STN  C+2-3- 3.2/185 (0-3.6)    [employer] Percept RC 3/6/25  R chest IPG/ R STN  IP%  MONISHA: 3/2/2040  Impedances all OK     Initial Settings  New Group  R STN  1+2- 5.0/120/125  2+3-  "5.0/120/125    Programming:  Marked worsening of tremor with DBS off---also LLE 2+ and LUE 4+    New group tried increasing the amp to 5.0/5.0 and tremor improved. Increased PW to 100 and resting tremor resolves--1+ kinetic tremor.  Both programs increased PW up to 120 without SE but does have breakthrough tremor.     Some difficulty describing SE and gave some time between adjusting settings, made some programming difficult.   Slowly adjusted PW and amp as tolerated.    C+1-2- 1.5/1.5 and L facial contraction    0+1- 4.3/90/125  2-3+4.5/80/125  Higher settings improved tremor but felt \"funny\" and above settings did not improve tremor    1+2-3- 4.5/110/125  2+3- 5.0/120/125  No tremor but feels lightheaded and lowered Pro 1 PW to 100 and worsens tremor    Final Settings:  Group C--new group  R STN  1+2- 4.0/70/125    2+3- 5.0/80/125     Group B-old group   R STN  2+3- 6.3/110/185  (0-6.3)    Old group  R STN  2+3- 6.3/90/185  (0-6.3)    Assessment/Plan   Mr. Thomas Frankel is a 74 y.o. M with PD (dx 1994, s/p bl STN [employer] DBS in 2006), cognitive decline (one donepezil). Patient looks overall motorically good 30 years into PD on Stalevo, tolerating without SE, though frequent dosing including at 2am--tried stopping but could not tolerate. IPGs replaced to Percept 3/6/25 with worsening of motor sx, DBS adjusted last visit and still with bothersome LUE tremor.     He and wife feel he will never feel the same since surgery for IPG replacement.     PD: worse: LUE tremor worse, more wearing off since going from Activa to Percept RC--I have previously d/w Dr. Sutton with plan to continue programming.   Discussed that since tremor is not constant, DBS syetem is intact without abnormal impedance, he >3hrs since last dose Sinemet and noting wearing off usually at 1.5hrs, turning DBS off immediately worsens tremors, improves with current program and new program he was placed on today  Will discuss at DBS meeting next " "week  D/w rep and patient was noting that his sx were worsening when he met re: possibility of getting new RC IPG and was the side that was depleting but not sure which side it was for sure (1 was MONISHA and the other at 2.8)   Wife feels better knowing percept IPG came out in 2020 and is not new and that he does not have any new tech (cannot have brain sense)  Consider Rytary, Crexont or Vyalev in the future   Most interested in Crexont, not Vyalev but does not want to make adjustments just yet which I agree with  Hesitant to change once stabilizes to Crexont though could be helpful as there could be an adjustment period  Dysphagia: stable, pending f/u ENT apt in Aug  Constipation: improved w/ diet      Diagnoses and all orders for this visit:  Parkinson's disease without dyskinesia, with fluctuating manifestations      #We discussed trying Crexont to help with wearing off and less frequent dosing     #You are going home on \"new group #2\" , new settings for left side of body (R chest IPG/battery)    You can always try \"new group 5/14/2\"--the group you came in on today if any issues    If side effects just on one side, then OK to lower that side a click (0.1v) every few mins until resolves.    You have the ability to increase the amplitude on right or left side as needed for symptom improvement: tremor, stiffness, slowness of movement.    You can increase by 0.1V every few days for one side of your body as needed. Increasing one side at a time allows you to notice if side effect with that increase BEFORE you adjust the other side.   You can also decrease if any side effects at any time (dyskinesia or other).    You can TURN OFF the DBS at any time if any concern for side effect/worsening symptoms.    #Continue meds unchanged      Med Dose   Stalevo 150mg  1 tab 6 x/day  (prescribed 5x/day)   Donepezil 10mg 1 tab in the am   Fludrocortisone 0.1mg 1 tab daily     #Follow up as scheduled with me on a day when Dr. Sutton " is here.         Total time of 50 minutes for today's visit, of which 10 mins were spent with DBS programming as noted above-checking settings, stimulator events, energy level, and impedances and updating settings in the chart.  IJERALD, personally performed  a medically appropriate exam, discussion of care/treatment options for the remaining time.       Kam Chamorro NP-C  Adult/Gerontological Nurse Practitioner   [facility], Department of Neurology  [facility] [place]  [facility]  [address] [place] [address]  Phone: [phone]  Fax: [phone]

## 2025-07-03 NOTE — PATIENT INSTRUCTIONS
"#We discussed trying Crexont to help with wearing off and less frequent dosing     #You are going home on \"new group #2\" , new settings for left side of body (R chest IPG/battery)    You can always try \"new group 5/14/2\"--the group you came in on today if any issues    If side effects just on one side, then OK to lower that side a click (0.1v) every few mins until resolves.    You have the ability to increase the amplitude on right or left side as needed for symptom improvement: tremor, stiffness, slowness of movement.    You can increase by 0.1V every few days for one side of your body as needed. Increasing one side at a time allows you to notice if side effect with that increase BEFORE you adjust the other side.   You can also decrease if any side effects at any time (dyskinesia or other).    You can TURN OFF the DBS at any time if any concern for side effect/worsening symptoms.    #Continue meds unchanged      Med Dose   Stalevo 150mg  1 tab 6 x/day  (prescribed 5x/day)   Donepezil 10mg 1 tab in the am   Fludrocortisone 0.1mg 1 tab daily     #Follow up as scheduled with me on a day when Dr. Sutton is here.     Kam Chamorro, NP-C  Adult/Gerontological Nurse Practitioner   [facility], Department of Neurology  [facility] [place]  [facility]  [id number] [place] [address] [place] [id number]  Phone: [phone]  Fax: [phone]  "

## 2025-07-22 ENCOUNTER — APPOINTMENT (OUTPATIENT)
Dept: PRIMARY CARE | Facility: CLINIC | Age: 75
End: 2025-07-22
Payer: MEDICARE

## 2025-07-23 ENCOUNTER — APPOINTMENT (OUTPATIENT)
Dept: NEUROLOGY | Facility: CLINIC | Age: 75
End: 2025-07-23
Payer: MEDICARE

## 2025-08-01 ENCOUNTER — TELEPHONE (OUTPATIENT)
Dept: NEUROLOGY | Facility: CLINIC | Age: 75
End: 2025-08-01
Payer: MEDICARE

## 2025-08-01 DIAGNOSIS — G20.A2 PARKINSON'S DISEASE WITHOUT DYSKINESIA, WITH FLUCTUATING MANIFESTATIONS: ICD-10-CM

## 2025-08-01 DIAGNOSIS — R41.89 COGNITIVE DECLINE: Primary | ICD-10-CM

## 2025-08-01 RX ORDER — DONEPEZIL HYDROCHLORIDE 10 MG/1
10 TABLET, FILM COATED ORAL NIGHTLY
Qty: 90 TABLET | Refills: 3 | Status: SHIPPED | OUTPATIENT
Start: 2025-08-01

## 2025-08-01 NOTE — TELEPHONE ENCOUNTER
[facility] called requesting refill of Toms Donepezil . Please send Donepezil 10mg 1 po every day # 90 with refills to local [facility] on file

## 2025-08-15 ENCOUNTER — OFFICE VISIT (OUTPATIENT)
Dept: NEUROSURGERY | Facility: CLINIC | Age: 75
End: 2025-08-15
Payer: MEDICARE

## 2025-08-15 VITALS
DIASTOLIC BLOOD PRESSURE: 93 MMHG | SYSTOLIC BLOOD PRESSURE: 145 MMHG | WEIGHT: 200 LBS | HEIGHT: 72 IN | TEMPERATURE: 97.5 F | BODY MASS INDEX: 27.09 KG/M2 | HEART RATE: 103 BPM

## 2025-08-15 DIAGNOSIS — G20.B2 PARKINSON'S DISEASE WITH DYSKINESIA AND FLUCTUATING MANIFESTATIONS: Primary | ICD-10-CM

## 2025-08-15 PROCEDURE — 1036F TOBACCO NON-USER: CPT | Performed by: NEUROLOGICAL SURGERY

## 2025-08-15 PROCEDURE — 99212 OFFICE O/P EST SF 10 MIN: CPT | Performed by: NEUROLOGICAL SURGERY

## 2025-08-15 PROCEDURE — 3080F DIAST BP >= 90 MM HG: CPT | Performed by: NEUROLOGICAL SURGERY

## 2025-08-15 PROCEDURE — 3077F SYST BP >= 140 MM HG: CPT | Performed by: NEUROLOGICAL SURGERY

## 2025-08-15 PROCEDURE — 1159F MED LIST DOCD IN RCRD: CPT | Performed by: NEUROLOGICAL SURGERY

## 2025-08-15 PROCEDURE — 1126F AMNT PAIN NOTED NONE PRSNT: CPT | Performed by: NEUROLOGICAL SURGERY

## 2025-08-15 PROCEDURE — 99024 POSTOP FOLLOW-UP VISIT: CPT | Performed by: NEUROLOGICAL SURGERY

## 2025-08-15 ASSESSMENT — ENCOUNTER SYMPTOMS
LOSS OF SENSATION IN FEET: 0
OCCASIONAL FEELINGS OF UNSTEADINESS: 0

## 2025-08-15 ASSESSMENT — PAIN SCALES - GENERAL: PAINLEVEL_OUTOF10: 0-NO PAIN

## 2025-08-26 ENCOUNTER — APPOINTMENT (OUTPATIENT)
Dept: OTOLARYNGOLOGY | Facility: CLINIC | Age: 75
End: 2025-08-26
Payer: MEDICARE

## 2025-09-02 ENCOUNTER — APPOINTMENT (OUTPATIENT)
Dept: PRIMARY CARE | Facility: CLINIC | Age: 75
End: 2025-09-02
Payer: MEDICARE

## 2025-09-02 PROBLEM — R29.6 FALLING: Status: ACTIVE | Noted: 2022-07-29

## 2025-09-02 ASSESSMENT — ENCOUNTER SYMPTOMS
FEVER: 0
CHILLS: 0
PALPITATIONS: 0
SHORTNESS OF BREATH: 0
POLYDIPSIA: 0
COUGH: 0

## 2025-09-02 ASSESSMENT — PAIN SCALES - GENERAL: PAINLEVEL_OUTOF10: 0-NO PAIN

## 2025-09-03 ENCOUNTER — APPOINTMENT (OUTPATIENT)
Dept: NEUROLOGY | Facility: CLINIC | Age: 75
End: 2025-09-03
Payer: MEDICARE

## 2025-09-03 ASSESSMENT — UNIFIED PARKINSONS DISEASE RATING SCALE (UPDRS)
PRONATION_SUPINATION_LEFT: 2
PARKINSONS_MEDS: YES
AMPLITUDE_LIP_JAW: 0
TOETAPPING_RIGHT: 1
KINETIC_TREMOR_RIGHTHAND: 1
LEG_AGILITY_RIGHT: 1
AMPLITUDE_RLE: 0
LEG_AGILITY_LEFT: 2
TOETAPPING_LEFT: 2
HANDMOVEMENTS_RIGHT: 0
POSTURAL_TREMOR_LEFTHAND: 0
POSTURAL_TREMOR_RIGHTHAND: 0
AMPLITUDE_LLE: 0
RIGIDITY_NECK: 0
RIGIDITY_RUE: 0
CONSTANCY_TREMOR_ATREST: 1
AMPLITUDE_LUE: 0
SPEECH: 2
RIGIDITY_LUE: 2
KINETIC_TREMOR_LEFTHAND: 0
FINGER_TAPPING_RIGHT: 1
RIGIDITY_LLE: 0
RIGIDITY_RLE: 0
FACIAL_EXPRESSION: 2
FINGER_TAPPING_LEFT: 1
CLINICAL_STATE: ON
PRONATION_SUPINATION_RIGHT: 1
LEVODOPA: YES
CHAIR_RISING_SCALE: 1
AMPLITUDE_RUE: 1
DYSKINESIAS_PRESENT: NO

## 2025-09-03 ASSESSMENT — PATIENT HEALTH QUESTIONNAIRE - PHQ9
4. FEELING TIRED OR HAVING LITTLE ENERGY: SEVERAL DAYS
8. MOVING OR SPEAKING SO SLOWLY THAT OTHER PEOPLE COULD HAVE NOTICED. OR THE OPPOSITE, BEING SO FIGETY OR RESTLESS THAT YOU HAVE BEEN MOVING AROUND A LOT MORE THAN USUAL: SEVERAL DAYS
1. LITTLE INTEREST OR PLEASURE IN DOING THINGS: SEVERAL DAYS
SUM OF ALL RESPONSES TO PHQ QUESTIONS 1-9: 8
5. POOR APPETITE OR OVEREATING: NOT AT ALL
2. FEELING DOWN, DEPRESSED OR HOPELESS: MORE THAN HALF THE DAYS
6. FEELING BAD ABOUT YOURSELF - OR THAT YOU ARE A FAILURE OR HAVE LET YOURSELF OR YOUR FAMILY DOWN: SEVERAL DAYS
7. TROUBLE CONCENTRATING ON THINGS, SUCH AS READING THE NEWSPAPER OR WATCHING TELEVISION: SEVERAL DAYS
3. TROUBLE FALLING OR STAYING ASLEEP OR SLEEPING TOO MUCH: SEVERAL DAYS
9. THOUGHTS THAT YOU WOULD BE BETTER OFF DEAD, OR OF HURTING YOURSELF: NOT AT ALL
SUM OF ALL RESPONSES TO PHQ9 QUESTIONS 1 AND 2: 3

## 2025-09-03 ASSESSMENT — ENCOUNTER SYMPTOMS
OCCASIONAL FEELINGS OF UNSTEADINESS: 1
LOSS OF SENSATION IN FEET: 0
DEPRESSION: 1

## 2025-12-10 ENCOUNTER — APPOINTMENT (OUTPATIENT)
Dept: NEUROLOGY | Facility: CLINIC | Age: 75
End: 2025-12-10
Payer: MEDICARE

## 2026-03-04 ENCOUNTER — APPOINTMENT (OUTPATIENT)
Dept: NEUROLOGY | Facility: CLINIC | Age: 76
End: 2026-03-04
Payer: MEDICARE

## (undated) DEVICE — GLOVE, SURGICAL, PROTEXIS PI BLUE W/NEUTHERA, 6.5, PF, LF

## (undated) DEVICE — Device

## (undated) DEVICE — TOWEL, SURGICAL, NEURO, O/R, 16 X 26, BLUE, STERILE

## (undated) DEVICE — DRAPE, INSTRUMENT, W/POUCH, STERI DRAPE, 7 X 11 IN, DISPOSABLE, STERILE

## (undated) DEVICE — ADHESIVE, SKIN, DERMABOND ADVANCED, 15CM, PEN-STYLE

## (undated) DEVICE — SUTURE, VICRYL RAPIDE 4-0, PS-2, 3/8 CIRCLE, UNDYED, BRAIDED, 27"

## (undated) DEVICE — STAPLER, SKIN PROXIMATE, 35 WIDE

## (undated) DEVICE — PAD, GROUNDING, ELECTROSURGICAL, W/9 FT CABLE, POLYHESIVE II, ADULT, LF

## (undated) DEVICE — DRAPE COVER, C ARM, FLOUROSCAN IMAGING SYS

## (undated) DEVICE — SUTURE, SILK, 2-0, 18 IN, BLACK

## (undated) DEVICE — DRAPE, INCISE, ANTIMICROBIAL, IOBAN 2, LARGE, 17 X 23 IN, DISPOSABLE, STERILE

## (undated) DEVICE — SUTURE, SILK, 2-0, 30 IN, SH, BLACK

## (undated) DEVICE — SUTURE, POLYSORB, 2-0, 18 IN, GS23, DETACHABLE, MULTIPACK, UNDYED

## (undated) DEVICE — PREP TRAY, VAGINAL

## (undated) DEVICE — COUNTER, NEEDLE, FOAM BLOCK, POP-N-COUNT, W/BLADEGUARD, W/ADHESIVE 40 COUNT, RED

## (undated) DEVICE — NEEDLE, HYPODERMIC, REGULAR WALL, REGULAR BEVEL, 30 G X 1 IN

## (undated) DEVICE — SUTURE, VICRYL, 3-0,18 IN, SH, UNDYED

## (undated) DEVICE — HANDSET WITH COMMUNICATOR

## (undated) DEVICE — DRAPE, TIBURON, SPLIT SHEET, REINF ADH STRIP, 77X122

## (undated) DEVICE — MARKER, SKIN, DUAL TIP INK W/9 LABEL AND REMOVABLE TIME OUT SLEEVE

## (undated) DEVICE — NEEDLE, HYPODERMIC, REGULAR WALL, REGULAR BEVEL, 30 G X 0.5 IN

## (undated) DEVICE — CORD, CAUTERY, BIOPOLAR FORCEP, 12FT